# Patient Record
Sex: FEMALE | Race: WHITE | Employment: OTHER | URBAN - NONMETROPOLITAN AREA
[De-identification: names, ages, dates, MRNs, and addresses within clinical notes are randomized per-mention and may not be internally consistent; named-entity substitution may affect disease eponyms.]

---

## 2021-01-05 RX ORDER — MODAFINIL 200 MG/1
200 TABLET ORAL DAILY
COMMUNITY
End: 2021-02-25

## 2021-01-05 RX ORDER — ESCITALOPRAM OXALATE 20 MG/1
20 TABLET ORAL DAILY
COMMUNITY
End: 2022-10-06 | Stop reason: ALTCHOICE

## 2021-01-05 RX ORDER — ALBUTEROL SULFATE 90 UG/1
AEROSOL, METERED RESPIRATORY (INHALATION)
COMMUNITY
End: 2021-02-25

## 2021-01-05 RX ORDER — BUPROPION HYDROCHLORIDE 300 MG/1
300 TABLET ORAL
COMMUNITY

## 2021-01-05 RX ORDER — BUPROPION HYDROCHLORIDE 150 MG/1
150 TABLET ORAL
COMMUNITY
End: 2021-02-25

## 2021-02-25 ENCOUNTER — APPOINTMENT (OUTPATIENT)
Dept: GENERAL RADIOLOGY | Age: 61
DRG: 494 | End: 2021-02-25
Attending: EMERGENCY MEDICINE
Payer: OTHER GOVERNMENT

## 2021-02-25 ENCOUNTER — APPOINTMENT (OUTPATIENT)
Dept: GENERAL RADIOLOGY | Age: 61
DRG: 494 | End: 2021-02-25
Attending: ORTHOPAEDIC SURGERY
Payer: OTHER GOVERNMENT

## 2021-02-25 ENCOUNTER — ANESTHESIA EVENT (OUTPATIENT)
Dept: SURGERY | Age: 61
DRG: 494 | End: 2021-02-25
Payer: OTHER GOVERNMENT

## 2021-02-25 ENCOUNTER — ANESTHESIA (OUTPATIENT)
Dept: SURGERY | Age: 61
DRG: 494 | End: 2021-02-25
Payer: OTHER GOVERNMENT

## 2021-02-25 ENCOUNTER — HOSPITAL ENCOUNTER (INPATIENT)
Age: 61
LOS: 2 days | Discharge: HOME HEALTH | DRG: 494 | End: 2021-02-28
Attending: EMERGENCY MEDICINE | Admitting: INTERNAL MEDICINE
Payer: OTHER GOVERNMENT

## 2021-02-25 DIAGNOSIS — S82.899A FRACTURE OF ANKLE: ICD-10-CM

## 2021-02-25 DIAGNOSIS — S99.911A RIGHT ANKLE INJURY, INITIAL ENCOUNTER: ICD-10-CM

## 2021-02-25 DIAGNOSIS — S99.911A RIGHT ANKLE INJURY, INITIAL ENCOUNTER: Primary | ICD-10-CM

## 2021-02-25 DIAGNOSIS — S82.892A CLOSED FRACTURE OF MALLEOLUS OF LEFT ANKLE, INITIAL ENCOUNTER: ICD-10-CM

## 2021-02-25 DIAGNOSIS — S82.851A CLOSED TRIMALLEOLAR FRACTURE OF RIGHT ANKLE, INITIAL ENCOUNTER: Primary | ICD-10-CM

## 2021-02-25 PROBLEM — M25.571 ACUTE RIGHT ANKLE PAIN: Status: ACTIVE | Noted: 2021-02-25

## 2021-02-25 PROBLEM — S82.202A LEFT TIBIAL FRACTURE: Status: ACTIVE | Noted: 2021-02-25

## 2021-02-25 PROBLEM — E78.2 HYPERLIPIDEMIA, MIXED: Status: ACTIVE | Noted: 2021-02-25

## 2021-02-25 PROBLEM — I10 ESSENTIAL HYPERTENSION: Status: ACTIVE | Noted: 2021-02-25

## 2021-02-25 PROBLEM — K21.9 CHRONIC GERD: Status: ACTIVE | Noted: 2021-02-25

## 2021-02-25 PROBLEM — Z98.890 S/P SURGICAL MANIPULATION OF ANKLE JOINT: Status: ACTIVE | Noted: 2021-02-25

## 2021-02-25 PROBLEM — E11.9 DIABETES MELLITUS TYPE 2, CONTROLLED (HCC): Status: ACTIVE | Noted: 2021-02-25

## 2021-02-25 PROBLEM — S82.841A ANKLE FRACTURE, BIMALLEOLAR, CLOSED, RIGHT, INITIAL ENCOUNTER: Status: ACTIVE | Noted: 2021-02-25

## 2021-02-25 PROBLEM — S92.109A TALUS FRACTURE: Status: ACTIVE | Noted: 2021-02-25

## 2021-02-25 PROBLEM — F32.9 DEPRESSION, MAJOR: Status: ACTIVE | Noted: 2021-02-25

## 2021-02-25 LAB
ABO + RH BLD: NORMAL
ANION GAP SERPL CALC-SCNC: 14 MMOL/L
APPEARANCE UR: CLEAR
APTT PPP: 24.8 SEC (ref 23–36.4)
BASOPHILS # BLD: 0 K/UL (ref 0–0.1)
BASOPHILS NFR BLD: 0 % (ref 0–2)
BILIRUB UR QL: NEGATIVE
BLOOD GROUP ANTIBODIES SERPL: NEGATIVE
BUN SERPL-MCNC: 16 MG/DL (ref 9–21)
BUN/CREAT SERPL: 13
CA-I BLD-MCNC: 9.7 MG/DL (ref 8.5–10.5)
CHLORIDE SERPL-SCNC: 97 MMOL/L (ref 94–111)
CO2 SERPL-SCNC: 26 MMOL/L (ref 21–33)
COLOR UR: NORMAL
COVID-19 RAPID TEST, COVR: NOT DETECTED
CREAT SERPL-MCNC: 1.2 MG/DL (ref 0.7–1.2)
EOSINOPHIL # BLD: 0.1 K/UL (ref 0–0.4)
EOSINOPHIL NFR BLD: 1 % (ref 0–5)
ERYTHROCYTE [DISTWIDTH] IN BLOOD BY AUTOMATED COUNT: 13.5 % (ref 11.6–14.5)
GLUCOSE BLD STRIP.AUTO-MCNC: 151 MG/DL (ref 70–110)
GLUCOSE SERPL-MCNC: 90 MG/DL (ref 70–110)
GLUCOSE UR STRIP.AUTO-MCNC: NEGATIVE MG/DL
HCT VFR BLD AUTO: 37.3 % (ref 35–45)
HGB BLD-MCNC: 12.3 G/DL (ref 12–16)
HGB UR QL STRIP: NEGATIVE
IMM GRANULOCYTES # BLD AUTO: 0 K/UL
IMM GRANULOCYTES NFR BLD AUTO: 0 %
INR PPP: 0.9 (ref 0.8–1.2)
KETONES UR QL STRIP.AUTO: NEGATIVE MG/DL
LEUKOCYTE ESTERASE UR QL STRIP.AUTO: NEGATIVE
LYMPHOCYTES # BLD: 2.9 K/UL (ref 0.9–3.6)
LYMPHOCYTES NFR BLD: 30 % (ref 21–52)
MCH RBC QN AUTO: 29.3 PG (ref 24–34)
MCHC RBC AUTO-ENTMCNC: 33 G/DL (ref 31–37)
MCV RBC AUTO: 88.8 FL (ref 74–97)
MONOCYTES # BLD: 0.8 K/UL (ref 0.05–1.2)
MONOCYTES NFR BLD: 8 % (ref 3–10)
NEUTS SEG # BLD: 6 K/UL (ref 1.8–8)
NEUTS SEG NFR BLD: 61 % (ref 40–73)
NITRITE UR QL STRIP.AUTO: NEGATIVE
PERFORMED BY, TECHID: ABNORMAL
PH UR STRIP: 7 [PH] (ref 5–9)
PLATELET # BLD AUTO: 281 K/UL (ref 135–420)
PMV BLD AUTO: 9.8 FL (ref 9.2–11.8)
POTASSIUM SERPL-SCNC: 3.5 MMOL/L (ref 3.2–5.1)
PROT UR STRIP-MCNC: NEGATIVE MG/DL
PROTHROMBIN TIME: 12 SEC (ref 11.5–15.2)
RBC # BLD AUTO: 4.2 M/UL (ref 4.2–5.3)
SARS-COV-2, COV2: NORMAL
SODIUM SERPL-SCNC: 137 MMOL/L (ref 135–145)
SP GR UR REFRACTOMETRY: 1.01 (ref 1–1.03)
SPECIMEN EXP DATE BLD: NORMAL
SPECIMEN SOURCE: NORMAL
THERAPEUTIC RANGE,PTTT: NORMAL SEC (ref 68–109)
UROBILINOGEN UR QL STRIP.AUTO: 0.2 EU/DL (ref 0.2–1)
WBC # BLD AUTO: 9.7 K/UL (ref 4.6–13.2)

## 2021-02-25 PROCEDURE — 77030010509 HC AIRWY LMA MSK TELE -A: Performed by: NURSE ANESTHETIST, CERTIFIED REGISTERED

## 2021-02-25 PROCEDURE — 77030003779: Performed by: ORTHOPAEDIC SURGERY

## 2021-02-25 PROCEDURE — 80048 BASIC METABOLIC PNL TOTAL CA: CPT

## 2021-02-25 PROCEDURE — 64450 NJX AA&/STRD OTHER PN/BRANCH: CPT | Performed by: NURSE ANESTHETIST, CERTIFIED REGISTERED

## 2021-02-25 PROCEDURE — 77030003915: Performed by: ORTHOPAEDIC SURGERY

## 2021-02-25 PROCEDURE — 77030002982 HC SUT POLYSRB J&J -A: Performed by: ORTHOPAEDIC SURGERY

## 2021-02-25 PROCEDURE — 77030018673: Performed by: ORTHOPAEDIC SURGERY

## 2021-02-25 PROCEDURE — 74011000250 HC RX REV CODE- 250: Performed by: NURSE ANESTHETIST, CERTIFIED REGISTERED

## 2021-02-25 PROCEDURE — 85730 THROMBOPLASTIN TIME PARTIAL: CPT

## 2021-02-25 PROCEDURE — 99218 HC RM OBSERVATION: CPT

## 2021-02-25 PROCEDURE — 81003 URINALYSIS AUTO W/O SCOPE: CPT

## 2021-02-25 PROCEDURE — 77030008462 HC STPLR SKN PROX J&J -A: Performed by: ORTHOPAEDIC SURGERY

## 2021-02-25 PROCEDURE — 74011250636 HC RX REV CODE- 250/636: Performed by: NURSE ANESTHETIST, CERTIFIED REGISTERED

## 2021-02-25 PROCEDURE — 74011250637 HC RX REV CODE- 250/637: Performed by: ORTHOPAEDIC SURGERY

## 2021-02-25 PROCEDURE — 74011250636 HC RX REV CODE- 250/636: Performed by: EMERGENCY MEDICINE

## 2021-02-25 PROCEDURE — 73600 X-RAY EXAM OF ANKLE: CPT

## 2021-02-25 PROCEDURE — 86901 BLOOD TYPING SEROLOGIC RH(D): CPT

## 2021-02-25 PROCEDURE — 77030013079 HC BLNKT BAIR HGGR 3M -A: Performed by: NURSE ANESTHETIST, CERTIFIED REGISTERED

## 2021-02-25 PROCEDURE — C1713 ANCHOR/SCREW BN/BN,TIS/BN: HCPCS | Performed by: ORTHOPAEDIC SURGERY

## 2021-02-25 PROCEDURE — 96375 TX/PRO/DX INJ NEW DRUG ADDON: CPT

## 2021-02-25 PROCEDURE — 74011000258 HC RX REV CODE- 258: Performed by: ORTHOPAEDIC SURGERY

## 2021-02-25 PROCEDURE — 76060000034 HC ANESTHESIA 1.5 TO 2 HR: Performed by: ORTHOPAEDIC SURGERY

## 2021-02-25 PROCEDURE — 77030029372 HC ADH SKN CLSR PRINEO J&J -C: Performed by: ORTHOPAEDIC SURGERY

## 2021-02-25 PROCEDURE — 73610 X-RAY EXAM OF ANKLE: CPT

## 2021-02-25 PROCEDURE — 71045 X-RAY EXAM CHEST 1 VIEW: CPT

## 2021-02-25 PROCEDURE — 36415 COLL VENOUS BLD VENIPUNCTURE: CPT

## 2021-02-25 PROCEDURE — 76210000006 HC OR PH I REC 0.5 TO 1 HR: Performed by: ORTHOPAEDIC SURGERY

## 2021-02-25 PROCEDURE — 77030000032 HC CUF TRNQT ZIMM -B: Performed by: ORTHOPAEDIC SURGERY

## 2021-02-25 PROCEDURE — 74011250636 HC RX REV CODE- 250/636: Performed by: ORTHOPAEDIC SURGERY

## 2021-02-25 PROCEDURE — 2709999900 HC NON-CHARGEABLE SUPPLY: Performed by: ORTHOPAEDIC SURGERY

## 2021-02-25 PROCEDURE — 77010033678 HC OXYGEN DAILY

## 2021-02-25 PROCEDURE — 74011000272 HC RX REV CODE- 272: Performed by: ORTHOPAEDIC SURGERY

## 2021-02-25 PROCEDURE — 82962 GLUCOSE BLOOD TEST: CPT

## 2021-02-25 PROCEDURE — 77030003880 HC BIT DRL TWST BIOM -C: Performed by: ORTHOPAEDIC SURGERY

## 2021-02-25 PROCEDURE — 74011000250 HC RX REV CODE- 250: Performed by: ORTHOPAEDIC SURGERY

## 2021-02-25 PROCEDURE — 85025 COMPLETE CBC W/AUTO DIFF WBC: CPT

## 2021-02-25 PROCEDURE — 96374 THER/PROPH/DIAG INJ IV PUSH: CPT

## 2021-02-25 PROCEDURE — 76942 ECHO GUIDE FOR BIOPSY: CPT | Performed by: NURSE ANESTHETIST, CERTIFIED REGISTERED

## 2021-02-25 PROCEDURE — 0QSJ04Z REPOSITION RIGHT FIBULA WITH INTERNAL FIXATION DEVICE, OPEN APPROACH: ICD-10-PCS | Performed by: ORTHOPAEDIC SURGERY

## 2021-02-25 PROCEDURE — 85610 PROTHROMBIN TIME: CPT

## 2021-02-25 PROCEDURE — 93005 ELECTROCARDIOGRAM TRACING: CPT

## 2021-02-25 PROCEDURE — 76010000153 HC OR TIME 1.5 TO 2 HR: Performed by: ORTHOPAEDIC SURGERY

## 2021-02-25 PROCEDURE — 96376 TX/PRO/DX INJ SAME DRUG ADON: CPT

## 2021-02-25 PROCEDURE — 77030008832 HC WRE K ZIMM -B: Performed by: ORTHOPAEDIC SURGERY

## 2021-02-25 PROCEDURE — 77030031139 HC SUT VCRL2 J&J -A: Performed by: ORTHOPAEDIC SURGERY

## 2021-02-25 PROCEDURE — 99285 EMERGENCY DEPT VISIT HI MDM: CPT

## 2021-02-25 PROCEDURE — 87635 SARS-COV-2 COVID-19 AMP PRB: CPT

## 2021-02-25 DEVICE — IMPLANTABLE DEVICE: Type: IMPLANTABLE DEVICE | Site: ANKLE | Status: FUNCTIONAL

## 2021-02-25 DEVICE — SCREW BNE L10MM DIA3.5MM STD CORT TI DST TIB ST LOK FULL: Type: IMPLANTABLE DEVICE | Site: ANKLE | Status: FUNCTIONAL

## 2021-02-25 DEVICE — SCREW BNE L12MM DIA3.5MM STD CORT DST TIB TI ST LOK FULL: Type: IMPLANTABLE DEVICE | Site: ANKLE | Status: FUNCTIONAL

## 2021-02-25 DEVICE — SCREW BNE L28MM DIA3.5MM CORT DST TIB TI NONCANNULATED: Type: IMPLANTABLE DEVICE | Site: ANKLE | Status: FUNCTIONAL

## 2021-02-25 DEVICE — SCREW BNE L14MM DIA3.5MM EL TI ST LOK MULTDIR FOR ALPS: Type: IMPLANTABLE DEVICE | Site: ANKLE | Status: FUNCTIONAL

## 2021-02-25 DEVICE — SCREW BNE L16MM DIA3.5MM CORT DST TIB TYP II ANODIZED TI ST: Type: IMPLANTABLE DEVICE | Site: ANKLE | Status: FUNCTIONAL

## 2021-02-25 RX ORDER — HYDROMORPHONE HYDROCHLORIDE 1 MG/ML
1 INJECTION, SOLUTION INTRAMUSCULAR; INTRAVENOUS; SUBCUTANEOUS ONCE
Status: COMPLETED | OUTPATIENT
Start: 2021-02-25 | End: 2021-02-25

## 2021-02-25 RX ORDER — KETOROLAC TROMETHAMINE 30 MG/ML
INJECTION, SOLUTION INTRAMUSCULAR; INTRAVENOUS AS NEEDED
Status: DISCONTINUED | OUTPATIENT
Start: 2021-02-25 | End: 2021-02-25 | Stop reason: HOSPADM

## 2021-02-25 RX ORDER — ATORVASTATIN CALCIUM 20 MG/1
20 TABLET, FILM COATED ORAL DAILY
COMMUNITY

## 2021-02-25 RX ORDER — HYDROCHLOROTHIAZIDE 12.5 MG/1
12.5 CAPSULE ORAL DAILY
COMMUNITY
End: 2022-10-06

## 2021-02-25 RX ORDER — BUPROPION HYDROCHLORIDE 150 MG/1
300 TABLET ORAL
Status: DISCONTINUED | OUTPATIENT
Start: 2021-02-26 | End: 2021-02-28 | Stop reason: HOSPADM

## 2021-02-25 RX ORDER — BISMUTH SUBSALICYLATE 262 MG
1 TABLET,CHEWABLE ORAL DAILY
COMMUNITY

## 2021-02-25 RX ORDER — CALCIUM CARBONATE/VITAMIN D3 600 MG-125
600 TABLET ORAL
COMMUNITY
End: 2022-10-06

## 2021-02-25 RX ORDER — DEXAMETHASONE SODIUM PHOSPHATE 4 MG/ML
INJECTION, SOLUTION INTRA-ARTICULAR; INTRALESIONAL; INTRAMUSCULAR; INTRAVENOUS; SOFT TISSUE
Status: SHIPPED | OUTPATIENT
Start: 2021-02-25 | End: 2021-02-25

## 2021-02-25 RX ORDER — OMEPRAZOLE 20 MG/1
20 CAPSULE, DELAYED RELEASE ORAL DAILY
Status: DISCONTINUED | OUTPATIENT
Start: 2021-02-26 | End: 2021-02-26

## 2021-02-25 RX ORDER — FENTANYL CITRATE 50 UG/ML
25 INJECTION, SOLUTION INTRAMUSCULAR; INTRAVENOUS
Status: DISCONTINUED | OUTPATIENT
Start: 2021-02-25 | End: 2021-02-25 | Stop reason: HOSPADM

## 2021-02-25 RX ORDER — FLUMAZENIL 0.1 MG/ML
0.2 INJECTION INTRAVENOUS
Status: DISCONTINUED | OUTPATIENT
Start: 2021-02-25 | End: 2021-02-25 | Stop reason: HOSPADM

## 2021-02-25 RX ORDER — ACETAMINOPHEN 325 MG/1
650 TABLET ORAL
Status: DISCONTINUED | OUTPATIENT
Start: 2021-02-25 | End: 2021-02-28 | Stop reason: HOSPADM

## 2021-02-25 RX ORDER — DEXAMETHASONE SODIUM PHOSPHATE 4 MG/ML
INJECTION, SOLUTION INTRA-ARTICULAR; INTRALESIONAL; INTRAMUSCULAR; INTRAVENOUS; SOFT TISSUE AS NEEDED
Status: DISCONTINUED | OUTPATIENT
Start: 2021-02-25 | End: 2021-02-25 | Stop reason: HOSPADM

## 2021-02-25 RX ORDER — HYDROCHLOROTHIAZIDE 12.5 MG/1
12.5 CAPSULE ORAL DAILY
Status: DISCONTINUED | OUTPATIENT
Start: 2021-02-26 | End: 2021-02-26

## 2021-02-25 RX ORDER — NALOXONE HYDROCHLORIDE 0.4 MG/ML
0.2 INJECTION, SOLUTION INTRAMUSCULAR; INTRAVENOUS; SUBCUTANEOUS AS NEEDED
Status: DISCONTINUED | OUTPATIENT
Start: 2021-02-25 | End: 2021-02-25 | Stop reason: HOSPADM

## 2021-02-25 RX ORDER — ASPIRIN 325 MG
325 TABLET, DELAYED RELEASE (ENTERIC COATED) ORAL 2 TIMES DAILY
Status: DISCONTINUED | OUTPATIENT
Start: 2021-02-25 | End: 2021-02-28 | Stop reason: HOSPADM

## 2021-02-25 RX ORDER — PHENOL/SODIUM PHENOLATE
20 AEROSOL, SPRAY (ML) MUCOUS MEMBRANE DAILY
COMMUNITY
End: 2021-05-11 | Stop reason: ALTCHOICE

## 2021-02-25 RX ORDER — GABAPENTIN 100 MG/1
200 CAPSULE ORAL DAILY
COMMUNITY
End: 2021-03-16 | Stop reason: ALTCHOICE

## 2021-02-25 RX ORDER — SODIUM CHLORIDE 9 MG/ML
150 INJECTION, SOLUTION INTRAVENOUS CONTINUOUS
Status: DISCONTINUED | OUTPATIENT
Start: 2021-02-25 | End: 2021-02-27

## 2021-02-25 RX ORDER — METFORMIN HYDROCHLORIDE 500 MG/1
500 TABLET ORAL
COMMUNITY
End: 2021-03-16 | Stop reason: ALTCHOICE

## 2021-02-25 RX ORDER — SODIUM CHLORIDE 0.9 % (FLUSH) 0.9 %
5-40 SYRINGE (ML) INJECTION AS NEEDED
Status: DISCONTINUED | OUTPATIENT
Start: 2021-02-25 | End: 2021-02-28 | Stop reason: HOSPADM

## 2021-02-25 RX ORDER — KETAMINE HCL IN 0.9 % NACL 50 MG/5 ML
SYRINGE (ML) INTRAVENOUS AS NEEDED
Status: DISCONTINUED | OUTPATIENT
Start: 2021-02-25 | End: 2021-02-25 | Stop reason: HOSPADM

## 2021-02-25 RX ORDER — MIDAZOLAM HYDROCHLORIDE 1 MG/ML
INJECTION, SOLUTION INTRAMUSCULAR; INTRAVENOUS AS NEEDED
Status: DISCONTINUED | OUTPATIENT
Start: 2021-02-25 | End: 2021-02-25 | Stop reason: HOSPADM

## 2021-02-25 RX ORDER — MAGNESIUM SULFATE 100 %
4 CRYSTALS MISCELLANEOUS AS NEEDED
Status: DISCONTINUED | OUTPATIENT
Start: 2021-02-25 | End: 2021-02-28 | Stop reason: HOSPADM

## 2021-02-25 RX ORDER — LOSARTAN POTASSIUM 25 MG/1
50 TABLET ORAL DAILY
Status: DISCONTINUED | OUTPATIENT
Start: 2021-02-26 | End: 2021-02-28 | Stop reason: HOSPADM

## 2021-02-25 RX ORDER — SODIUM CHLORIDE 0.9 % (FLUSH) 0.9 %
5-40 SYRINGE (ML) INJECTION EVERY 8 HOURS
Status: DISCONTINUED | OUTPATIENT
Start: 2021-02-25 | End: 2021-02-28 | Stop reason: HOSPADM

## 2021-02-25 RX ORDER — BUPIVACAINE HYDROCHLORIDE 5 MG/ML
INJECTION, SOLUTION EPIDURAL; INTRACAUDAL
Status: SHIPPED | OUTPATIENT
Start: 2021-02-25 | End: 2021-02-25

## 2021-02-25 RX ORDER — PROPOFOL 10 MG/ML
INJECTION, EMULSION INTRAVENOUS AS NEEDED
Status: DISCONTINUED | OUTPATIENT
Start: 2021-02-25 | End: 2021-02-25 | Stop reason: HOSPADM

## 2021-02-25 RX ORDER — DEXTROSE 50 % IN WATER (D50W) INTRAVENOUS SYRINGE
25-50 AS NEEDED
Status: DISCONTINUED | OUTPATIENT
Start: 2021-02-25 | End: 2021-02-28 | Stop reason: HOSPADM

## 2021-02-25 RX ORDER — GABAPENTIN 100 MG/1
200 CAPSULE ORAL DAILY
Status: DISCONTINUED | OUTPATIENT
Start: 2021-02-26 | End: 2021-02-28 | Stop reason: HOSPADM

## 2021-02-25 RX ORDER — CEPHALEXIN 500 MG/1
500 CAPSULE ORAL 4 TIMES DAILY
Qty: 28 CAP | Refills: 0 | OUTPATIENT
Start: 2021-02-25 | End: 2021-02-28

## 2021-02-25 RX ORDER — HYDROCODONE BITARTRATE AND ACETAMINOPHEN 5; 325 MG/1; MG/1
2 TABLET ORAL
Status: DISCONTINUED | OUTPATIENT
Start: 2021-02-25 | End: 2021-02-28 | Stop reason: HOSPADM

## 2021-02-25 RX ORDER — INSULIN LISPRO 100 [IU]/ML
INJECTION, SOLUTION INTRAVENOUS; SUBCUTANEOUS
Status: DISCONTINUED | OUTPATIENT
Start: 2021-02-26 | End: 2021-02-28 | Stop reason: HOSPADM

## 2021-02-25 RX ORDER — ASCORBIC ACID 500 MG
500 TABLET ORAL DAILY
Status: ON HOLD | COMMUNITY
End: 2022-02-22 | Stop reason: CLARIF

## 2021-02-25 RX ORDER — ASCORBIC ACID 500 MG
500 TABLET ORAL DAILY
Status: DISCONTINUED | OUTPATIENT
Start: 2021-02-26 | End: 2021-02-28 | Stop reason: HOSPADM

## 2021-02-25 RX ORDER — CITALOPRAM 20 MG/1
20 TABLET, FILM COATED ORAL DAILY
Status: DISCONTINUED | OUTPATIENT
Start: 2021-02-26 | End: 2021-02-28 | Stop reason: HOSPADM

## 2021-02-25 RX ORDER — HYDROCODONE BITARTRATE AND ACETAMINOPHEN 5; 325 MG/1; MG/1
1 TABLET ORAL AS NEEDED
Status: DISCONTINUED | OUTPATIENT
Start: 2021-02-25 | End: 2021-02-25 | Stop reason: HOSPADM

## 2021-02-25 RX ORDER — LOSARTAN POTASSIUM 50 MG/1
50 TABLET ORAL DAILY
COMMUNITY

## 2021-02-25 RX ORDER — ATORVASTATIN CALCIUM 10 MG/1
20 TABLET, FILM COATED ORAL DAILY
Status: DISCONTINUED | OUTPATIENT
Start: 2021-02-26 | End: 2021-02-28 | Stop reason: HOSPADM

## 2021-02-25 RX ORDER — ONDANSETRON 2 MG/ML
4 INJECTION INTRAMUSCULAR; INTRAVENOUS
Status: COMPLETED | OUTPATIENT
Start: 2021-02-25 | End: 2021-02-25

## 2021-02-25 RX ORDER — OXYCODONE AND ACETAMINOPHEN 5; 325 MG/1; MG/1
1 TABLET ORAL
Qty: 30 TAB | Refills: 0 | Status: SHIPPED | OUTPATIENT
Start: 2021-02-25 | End: 2021-02-28 | Stop reason: SDUPTHER

## 2021-02-25 RX ORDER — ONDANSETRON 2 MG/ML
4 INJECTION INTRAMUSCULAR; INTRAVENOUS ONCE
Status: DISCONTINUED | OUTPATIENT
Start: 2021-02-25 | End: 2021-02-25 | Stop reason: HOSPADM

## 2021-02-25 RX ORDER — ONDANSETRON 2 MG/ML
INJECTION INTRAMUSCULAR; INTRAVENOUS AS NEEDED
Status: DISCONTINUED | OUTPATIENT
Start: 2021-02-25 | End: 2021-02-25 | Stop reason: HOSPADM

## 2021-02-25 RX ORDER — HYDROMORPHONE HYDROCHLORIDE 2 MG/1
2 TABLET ORAL
Status: DISCONTINUED | OUTPATIENT
Start: 2021-02-25 | End: 2021-02-28 | Stop reason: HOSPADM

## 2021-02-25 RX ADMIN — PROPOFOL 150 MG: 10 INJECTION, EMULSION INTRAVENOUS at 17:20

## 2021-02-25 RX ADMIN — FENTANYL CITRATE 25 MCG: 50 INJECTION, SOLUTION INTRAMUSCULAR; INTRAVENOUS at 19:33

## 2021-02-25 RX ADMIN — BUPIVACAINE HYDROCHLORIDE 30 ML: 5 INJECTION, SOLUTION EPIDURAL; INTRACAUDAL at 07:09

## 2021-02-25 RX ADMIN — FENTANYL CITRATE 25 MCG: 50 INJECTION, SOLUTION INTRAMUSCULAR; INTRAVENOUS at 19:43

## 2021-02-25 RX ADMIN — HYDROMORPHONE HYDROCHLORIDE 1 MG: 1 INJECTION, SOLUTION INTRAMUSCULAR; INTRAVENOUS; SUBCUTANEOUS at 15:15

## 2021-02-25 RX ADMIN — ASPIRIN 325 MG: 325 TABLET, COATED ORAL at 21:26

## 2021-02-25 RX ADMIN — ONDANSETRON 4 MG: 2 INJECTION INTRAMUSCULAR; INTRAVENOUS at 14:36

## 2021-02-25 RX ADMIN — Medication 10 ML: at 21:26

## 2021-02-25 RX ADMIN — FENTANYL CITRATE 25 MCG: 50 INJECTION, SOLUTION INTRAMUSCULAR; INTRAVENOUS at 19:28

## 2021-02-25 RX ADMIN — CEFAZOLIN SODIUM 2 G: 1 INJECTION, POWDER, FOR SOLUTION INTRAMUSCULAR; INTRAVENOUS at 17:13

## 2021-02-25 RX ADMIN — SODIUM CHLORIDE 1000 ML: 9 INJECTION, SOLUTION INTRAVENOUS at 20:25

## 2021-02-25 RX ADMIN — FENTANYL CITRATE 25 MCG: 50 INJECTION, SOLUTION INTRAMUSCULAR; INTRAVENOUS at 19:38

## 2021-02-25 RX ADMIN — DEXAMETHASONE SODIUM PHOSPHATE 4 MG: 4 INJECTION, SOLUTION INTRAMUSCULAR; INTRAVENOUS at 17:25

## 2021-02-25 RX ADMIN — HYDROMORPHONE HYDROCHLORIDE 2 MG: 2 TABLET ORAL at 21:26

## 2021-02-25 RX ADMIN — HYDROMORPHONE HYDROCHLORIDE 1 MG: 1 INJECTION, SOLUTION INTRAMUSCULAR; INTRAVENOUS; SUBCUTANEOUS at 14:36

## 2021-02-25 RX ADMIN — KETOROLAC TROMETHAMINE 30 MG: 30 INJECTION, SOLUTION INTRAMUSCULAR at 18:35

## 2021-02-25 RX ADMIN — DEXAMETHASONE SODIUM PHOSPHATE 4 MG: 4 INJECTION, SOLUTION INTRAMUSCULAR; INTRAVENOUS at 07:09

## 2021-02-25 RX ADMIN — CEFAZOLIN SODIUM 2 G: 1 INJECTION, POWDER, FOR SOLUTION INTRAMUSCULAR; INTRAVENOUS at 21:25

## 2021-02-25 RX ADMIN — Medication 30 MG: at 17:25

## 2021-02-25 RX ADMIN — MIDAZOLAM 2 MG: 1 INJECTION INTRAMUSCULAR; INTRAVENOUS at 17:02

## 2021-02-25 RX ADMIN — SODIUM CHLORIDE: 9 INJECTION, SOLUTION INTRAVENOUS at 17:00

## 2021-02-25 RX ADMIN — Medication 20 MG: at 17:02

## 2021-02-25 RX ADMIN — ONDANSETRON HYDROCHLORIDE 4 MG: 2 INJECTION, SOLUTION INTRAMUSCULAR; INTRAVENOUS at 17:25

## 2021-02-25 NOTE — ANESTHESIA POSTPROCEDURE EVALUATION
Procedure(s):  RIGHT ANKLE OPEN REDUCTION INTERNAL FIXATION.     general, regional    Anesthesia Post Evaluation      Multimodal analgesia: multimodal analgesia used between 6 hours prior to anesthesia start to PACU discharge  Patient location during evaluation: PACU  Patient participation: complete - patient participated  Level of consciousness: awake and alert  Pain score: 0  Pain management: satisfactory to patient  Airway patency: patent  Anesthetic complications: no  Cardiovascular status: hemodynamically stable and acceptable  Respiratory status: spontaneous ventilation and acceptable  Post anesthesia nausea and vomiting:  none  Final Post Anesthesia Temperature Assessment:  Normothermia (36.0-37.5 degrees C)      INITIAL Post-op Vital signs:   Vitals Value Taken Time   /61 02/25/21 1850   Temp 36 °C (96.8 °F) 02/25/21 1850   Pulse 84 02/25/21 1850   Resp 15 02/25/21 1850   SpO2 97 % 02/25/21 1850

## 2021-02-25 NOTE — PROGRESS NOTES
Pt tx'd to OPS. Placed on cardiac monitor. Stable. Awaiting Dr. Kris Villa for surgery. Pt a/o x3 and has no complaints at this time.

## 2021-02-25 NOTE — ANESTHESIA PROCEDURE NOTES
Peripheral Block    Start time: 2/25/2021 5:02 PM  End time: 2/25/2021 5:11 PM  Performed by: Billy Prajapati CRNA  Authorized by: Billy Prajapati CRNA       Pre-procedure: Indications: post-op pain management    Preanesthetic Checklist: patient identified, risks and benefits discussed, site marked, timeout performed, anesthesia consent given and patient being monitored    Timeout Time: 17:02          Block Type:   Block Type:   Adductor canal and popliteal  Laterality:  Right  Monitoring:  Standard ASA monitoring, continuous pulse ox, frequent vital sign checks, heart rate, responsive to questions and oxygen  Needle Type:  Ultraplex  Needle Gauge:  20 G  Needle Localization:  Ultrasound guidance and anatomical landmarks  Medication Injected:  Bupivacaine (PF) (MARCAINE) 0.5% injection, 30 mL  dexamethasone (DECADRON) 4 mg/mL injection, 4 mg  Med Admin Time: 2/25/2021 7:09 AM    Assessment:  Number of attempts:  1  Injection Assessment:  Incremental injection every 5 mL, local visualized surrounding nerve on ultrasound, negative aspiration for blood, no paresthesia, no intravascular symptoms, low pressure verified by pressure monitor and ultrasound image on chart  Patient tolerance:  Patient tolerated the procedure well with no immediate complications

## 2021-02-25 NOTE — CONSULTS
Weight Loss Metrics 2/25/2021   Today's Wt 215 lb   BMI 29.16 kg/m2       Body mass index is 29.16 kg/m². Past Medical History:   Diagnosis Date    Diabetes ([address]     GERD (gastroesophageal reflux disease)     Hyperlipemia     Hypertension     Psychiatric disorder        Past Surgical History:   Procedure Laterality Date    HX CERVICAL FUSION         Current Facility-Administered Medications   Medication Dose Route Frequency Provider Last Rate Last Admin    sodium chloride 0.9 % bolus infusion 1,000 mL  1,000 mL IntraVENous ONCE Triston Patricia MD        0.9% sodium chloride infusion  150 mL/hr IntraVENous CONTINUOUS Triston Patricia MD        ceFAZolin (ANCEF) 2 g in 0.9% sodium chloride 100 mL IVPB  2 g IntraVENous ON CALL TO OR Chad Mccarthy MD             No Known Allergies    Social History     Tobacco Use    Smoking status: Never Smoker    Smokeless tobacco: Never Used   Substance Use Topics    Alcohol use: Not Currently    Drug use: Not Currently       History reviewed. No pertinent family history. ROS      Visit Vitals  /79 (BP 1 Location: Left upper arm, BP Patient Position: Supine)   Pulse 80   Temp 98.2 °F (36.8 °C)   Resp 17   Ht 6' (1.829 m)   Wt 215 lb (97.5 kg)   SpO2 97%   BMI 29.16 kg/m²     Patient is status post fall with bilateral ankle pain presents to the emergency room with bilateral ankle pain. Denies any other complaints. She is alert oriented and awake in no acute distress. X-rays of bilateral ankles are positive for fracture. X-ray of the left ankle is positive for nondisplaced distal fibula fracture. X-ray of right ankle positive for right ankle fracture subluxation trimalar fracture. Physical examination of the left lower extremity. Mild swelling good cap refill grossly neuro vas intact no instability decreased range of motion  some point tenderness. Physical examination of the right lower extremity well-padded splint.   Good cap refill. Mild deformities decreased range of motion decreased sense. Impression left ankle nondisplaced distal fibula fracture. Right ankle displaced trimall fracture. Plan for the left lower extremity weightbearing as tolerated in the cam boot. For the right lower extremity nonweightbearing post surgery. DVT prophylaxis with aspirin. Antibiotics. Pain medication. Plan will be for surgery. Risk and benefits of the surgery were explained to the patient. Consent was willingly obtained.

## 2021-02-25 NOTE — ANESTHESIA PREPROCEDURE EVALUATION
Relevant Problems   No relevant active problems       Anesthetic History   No history of anesthetic complications            Review of Systems / Medical History  Patient summary reviewed, nursing notes reviewed and pertinent labs reviewed    Pulmonary                   Neuro/Psych         Psychiatric history    Comments: Depression   anxiety Cardiovascular    Hypertension: well controlled          Hyperlipidemia    Exercise tolerance: >4 METS     GI/Hepatic/Renal     GERD: well controlled           Endo/Other    Diabetes: well controlled, type 2    Obesity     Other Findings              Physical Exam    Airway  Mallampati: I  TM Distance: < 4 cm  Neck ROM: normal range of motion   Mouth opening: Normal     Cardiovascular    Rhythm: regular  Rate: normal         Dental    Dentition: Lower dentition intact and Upper dentition intact     Pulmonary  Breath sounds clear to auscultation               Abdominal  Abdominal exam normal       Other Findings            Anesthetic Plan    ASA: 3, emergent  Anesthesia type: general and regional - popliteal fossa block and saphenous block          Induction: Intravenous  Anesthetic plan and risks discussed with: Patient

## 2021-02-25 NOTE — ED PROVIDER NOTES
EMERGENCY DEPARTMENT HISTORY AND PHYSICAL EXAM      Date: 2/25/2021  Patient Name: Carolin Grijalva    History of Presenting Illness     Chief Complaint   Patient presents with    Fall       History Provided By: Patient and EMS    HPI: Carolin Grijalva, 64 y.o. female presents to the ED with complaints of injuries sustained after a fall. Patient states that she was stepping down from a curb with her left foot when she believes that she tripped and landed on her right foot causing it to twist. She states that it is very painful and there is an obvious deformity to it. She also states that her left ankle hurts as well, but pales in comparison to the pain in her right ankle. Denies any other injuries. There are no other complaints, changes, or physical findings at this time. PCP: None    Current Facility-Administered Medications   Medication Dose Route Frequency Provider Last Rate Last Admin    sodium chloride 0.9 % bolus infusion 1,000 mL  1,000 mL IntraVENous ONCE Brent Patricia MD        0.9% sodium chloride infusion  150 mL/hr IntraVENous CONTINUOUS Brent Patricia MD           Past History     Past Medical History:  Past Medical History:   Diagnosis Date    Diabetes ([address]     GERD (gastroesophageal reflux disease)     Hyperlipemia     Hypertension     Psychiatric disorder        Past Surgical History:  Past Surgical History:   Procedure Laterality Date    HX CERVICAL FUSION         Family History:  History reviewed. No pertinent family history. Social History:  Social History     Tobacco Use    Smoking status: Never Smoker    Smokeless tobacco: Never Used   Substance Use Topics    Alcohol use: Not Currently    Drug use: Not Currently       Allergies:  No Known Allergies      Review of Systems   Review of Systems   Constitutional: Negative for diaphoresis, fatigue and fever. HENT: Negative for ear pain, rhinorrhea and sore throat.     Eyes: Negative for photophobia, pain and redness. Respiratory: Negative for cough, chest tightness and shortness of breath. Cardiovascular: Negative for chest pain, palpitations and leg swelling. Gastrointestinal: Negative for abdominal pain, diarrhea, nausea and vomiting. Endocrine: Negative for polydipsia, polyphagia and polyuria. Genitourinary: Negative for frequency, hematuria and pelvic pain. Musculoskeletal: Negative for arthralgias, back pain and joint swelling. Pain of the left and right ankles with an obvious deformity to the right. Skin: Negative for color change, pallor, rash and wound. Allergic/Immunologic: Negative for environmental allergies, food allergies and immunocompromised state. Neurological: Negative for dizziness, seizures, numbness and headaches. Hematological: Negative for adenopathy. Does not bruise/bleed easily. Psychiatric/Behavioral: Negative for confusion and self-injury. The patient is not nervous/anxious. Physical Exam   Physical Exam  Vitals signs and nursing note reviewed. Constitutional:       General: She is not in acute distress. Appearance: Normal appearance. She is normal weight. She is not ill-appearing. Comments: Uncomfortable and in pain   HENT:      Head: Normocephalic and atraumatic. Right Ear: Tympanic membrane, ear canal and external ear normal.      Left Ear: Tympanic membrane, ear canal and external ear normal.      Nose: Nose normal.      Mouth/Throat:      Mouth: Mucous membranes are moist.      Pharynx: Oropharynx is clear. Eyes:      Extraocular Movements: Extraocular movements intact. Conjunctiva/sclera: Conjunctivae normal.      Pupils: Pupils are equal, round, and reactive to light. Neck:      Musculoskeletal: Normal range of motion and neck supple. No neck rigidity or muscular tenderness. Cardiovascular:      Rate and Rhythm: Normal rate and regular rhythm. Pulses: Normal pulses. Heart sounds: Normal heart sounds. No murmur.  No friction rub. No gallop. Pulmonary:      Effort: Pulmonary effort is normal. No respiratory distress. Breath sounds: Normal breath sounds. Chest:      Chest wall: No tenderness. Abdominal:      General: Bowel sounds are normal.      Palpations: Abdomen is soft. There is no mass. Tenderness: There is no abdominal tenderness. Musculoskeletal:         General: Tenderness, deformity and signs of injury present. Right ankle: She exhibits decreased range of motion and deformity. She exhibits no swelling, no ecchymosis, no laceration and normal pulse. Tenderness. Left ankle: She exhibits swelling. She exhibits normal range of motion, no ecchymosis, no deformity, no laceration and normal pulse. Tenderness. Lateral malleolus tenderness found. Feet:       Comments: Mortise of the right foot is laterally displaced   Skin:     General: Skin is warm. Coloration: Skin is not pale. Findings: No bruising or erythema. Neurological:      Mental Status: She is alert and oriented to person, place, and time. Motor: No weakness. Psychiatric:         Mood and Affect: Mood normal.         Behavior: Behavior normal.         Thought Content: Thought content normal.         Judgment: Judgment normal.         Diagnostic Study Results     Labs -     Recent Results (from the past 12 hour(s))   CBC WITH AUTOMATED DIFF    Collection Time: 02/25/21  2:35 PM   Result Value Ref Range    WBC 9.7 4.6 - 13.2 K/uL    RBC 4.20 4. 20 - 5.30 M/uL    HGB 12.3 12.0 - 16.0 g/dL    HCT 37.3 35.0 - 45.0 %    MCV 88.8 74.0 - 97.0 FL    MCH 29.3 24.0 - 34.0 PG    MCHC 33.0 31.0 - 37.0 g/dL    RDW 13.5 11.6 - 14.5 %    PLATELET 608 807 - 175 K/uL    MPV 9.8 9.2 - 11.8 FL    NEUTROPHILS 61 40 - 73 %    LYMPHOCYTES 30 21 - 52 %    MONOCYTES 8 3 - 10 %    EOSINOPHILS 1 0 - 5 %    BASOPHILS 0 0 - 2 %    IMMATURE GRANULOCYTES 0 %    ABS. NEUTROPHILS 6.0 1.8 - 8.0 K/UL    ABS. LYMPHOCYTES 2.9 0.9 - 3.6 K/UL    ABS. MONOCYTES 0.8 0.05 - 1.2 K/UL    ABS. EOSINOPHILS 0.1 0.0 - 0.4 K/UL    ABS. BASOPHILS 0.0 0.0 - 0.1 K/UL    ABS. IMM. GRANS. 0.0 K/UL   METABOLIC PANEL, BASIC    Collection Time: 02/25/21  2:35 PM   Result Value Ref Range    Sodium 137 135 - 145 mmol/L    Potassium 3.5 3.2 - 5.1 mmol/L    Chloride 97 94 - 111 mmol/L    CO2 26 21 - 33 mmol/L    Anion gap 14 mmol/L    Glucose 90 70 - 110 mg/dL    BUN 16 9 - 21 mg/dL    Creatinine 1.20 0.70 - 1.20 mg/dL    BUN/Creatinine ratio 13      GFR est AA 55 ml/min/1.73m2    GFR est non-AA 46 ml/min/1.73m2    Calcium 9.7 8.5 - 10.5 mg/dL   TYPE & SCREEN    Collection Time: 02/25/21  2:35 PM   Result Value Ref Range    Crossmatch Expiration 02/28/2021,2359     ABO/Rh(D) A Positive     Antibody screen Negative    PROTHROMBIN TIME + INR    Collection Time: 02/25/21  2:35 PM   Result Value Ref Range    Prothrombin time 12.0 11.5 - 15.2 sec    INR 0.9 0.8 - 1.2     PTT    Collection Time: 02/25/21  2:35 PM   Result Value Ref Range    aPTT 24.8 23.0 - 36.4 sec    aPTT, therapeutic range   68 - 109 sec   SARS-COV-2    Collection Time: 02/25/21  3:20 PM   Result Value Ref Range    SARS-CoV-2 Please find results under separate order     COVID-19 RAPID TEST    Collection Time: 02/25/21  3:20 PM   Result Value Ref Range    Specimen source Nasopharyngeal      COVID-19 rapid test Not Detected Not Detected     URINALYSIS W/ RFLX MICROSCOPIC    Collection Time: 02/25/21  3:30 PM   Result Value Ref Range    Color Yellow/Straw      Appearance Clear Clear      Specific gravity 1.010 1.003 - 1.035      pH (UA) 7.0 5.0 - 9.0      Protein Negative Negative mg/dL    Glucose Negative Negative mg/dL    Ketone Negative Negative mg/dL    Bilirubin Negative Negative      Blood Negative Negative      Urobilinogen 0.2 0.2 - 1.0 EU/dL    Nitrites Negative Negative      Leukocyte Esterase Negative Negative         Radiologic Studies -   XR CHEST PORT   Final Result      No acute cardiopulmonary abnormality. XR ANKLE LT MIN 3 V   Final Result      1. Displaced and angulated trimalleolar right ankle fracture. 2. Nondisplaced distal left fibular fracture and small dorsal talar anterior   process avulsion fracture. XR ANKLE RT AP/LAT   Final Result      1. Displaced and angulated trimalleolar right ankle fracture. 2. Nondisplaced distal left fibular fracture and small dorsal talar anterior   process avulsion fracture. XR ANKLE RT MIN 3 V    (Results Pending)   NC XR TECHNOLOGIST SERVICE    (Results Pending)     CT Results  (Last 48 hours)    None        CXR Results  (Last 48 hours)               02/25/21 1518  XR CHEST PORT Final result    Impression:      No acute cardiopulmonary abnormality. Narrative:  EXAM: XR CHEST PORT       CLINICAL INDICATION/HISTORY: Fall   -Additional: None       COMPARISON: None       TECHNIQUE: Portable frontal view of the chest       _______________       FINDINGS:       SUPPORT DEVICES: None. HEART AND MEDIASTINUM: Cardiomediastinal silhouette within normal limits. LUNGS AND PLEURAL SPACES: No dense consolidation, large effusion or   pneumothorax.       _______________                 Medical Decision Making and ED Course   I am the first provider for this patient. I reviewed the vital signs, available nursing notes, past medical history, past surgical history, family history and social history. Vital Signs-Reviewed the patient's vital signs. Patient Vitals for the past 12 hrs:   Temp Pulse Resp BP SpO2   02/25/21 1619 98.2 °F (36.8 °C) 80 17 123/79 97 %   02/25/21 1600 -- 78 16 (!) 144/60 99 %   02/25/21 1530 -- 82 16 131/74 98 %   02/25/21 1446 -- 75 16 (!) 146/55 99 %   02/25/21 1346 98.3 °F (36.8 °C) 79 16 (!) 146/55 96 %       EKG interpretation: (Preliminary): Performed at 1538; Read at 1538. EKG: normal sinus rhythm, Left anterior fascicular block, Low voltage, precordial leads, Consider anterior infarct.  Rate:76      Records Reviewed: Nursing Notes    The patient presents with     ED Course:   Initial assessment performed. The patients presenting problems have been discussed, and they are in agreement with the care plan formulated and outlined with them. I have encouraged them to ask questions as they arise throughout their visit. Provider Notes (Medical Decision Making):         Consultations:       Consultations: [phone] with Dr. Ilir Garcia from Orthopedics suggested transferring the patient to an appropriate fascility as he is not in town right now. 1513: Dr. Kim Arnett gave a return call and  advised to offer patient the option of surgery here today or proceed with transfer to [facility] and the patient prefers to stay here. Procedures     Splinting: a posterior short leg splint was applied by MD using orthoglass. No neurovascular changes  Pt tolerated procedure well. Disposition     Disposition:  Admitted to OR            Diagnosis     Clinical Impression:   1. Closed trimalleolar fracture of right ankle, initial encounter    2. Fracture of ankle    3. Closed fracture of malleolus of left ankle, initial encounter        Attestations:    By signing my name below, I, Beverly Cordon, attest that this documentation has been prepared under the direction and in presence of Dr. Bryn Abbott on 02/25/21. Electronically signed: Beverly Cordon, 02/25/21, 1:58 PM      Please note that this dictation was completed with [employer], the computer voice recognition software. Quite often unanticipated grammatical, syntax, homophones, and other interpretive errors are inadvertently transcribed by the computer software. Please disregard these errors. Please excuse any errors that have escaped final proofreading. Thank you.

## 2021-02-25 NOTE — OP NOTES
Operative Note    Patient: Alicia Nassar MRN: [id number]  Surgery Date: 2/25/2021  [unfilled]          Procedure  Primary Surgeon    RIGHT ANKLE OPEN REDUCTION INTERNAL FIXATION  Tushar Biggs MD    * Panel 2 does not exist *  * Panel 2 does not exist *    * Panel 3 does not exist *  * Panel 3 does not exist *     Surgeon(s) and Role:     * Tushar Biggs MD - Primary    Other OR Staff/Assistants:  Circ-1: Wiliam Valentin  Registered Nurse First Assistant: Amirah Rodriguez  Scrub Tech-1: Joshua Mejia    1st Assistant Tasks:  Closing    Pre-operative Diagnosis:  Closed fracture of right ankle, initial encounter [S84.677E]    Post-operative Diagnosis: same as preop diagnosis    Anesthesia Type: General     Findings:     Complications: No    EBL: Minimal    Specimens: None    Procedure: Right ankle was prepped and draped in a sterile fashion after adequate anesthesia was given. Timeout was performed. Longitudinal incision on the lateral aspect of the ankle was made. The ankle fracture blunt dissection was performed. The fracture was identified. Reduction was provisionally achieved with a clamp. Subsequently a leg screw was placed. Then a neutralization plate with locking screws distally and proximally was placed. Patient was found to have excellent range of motion and stability on the lateral side. The medial side was found to be unstable. At that point a second incision on the medial side was performed. Blunt dissection was performed down to the medial side. Open reduction was performed using K wires. Subsequently 1 medial screw was placed into the the medial malleolus. After adequate fixation was achieved there patient was found excellent range of motion and stability. Copious irrigation performed. Skin was closed with Vicryl and staples. Compressive dressing was applied. Patient was taken to PACU in stable condition in a splint.     Fluoroscopy was used for approximately 5 minutes for assistance in reduction and placement of hardware both on the medial and lateral side of the ankle.

## 2021-02-26 LAB
ANION GAP SERPL CALC-SCNC: 8 MMOL/L
ATRIAL RATE: 77 BPM
BASOPHILS # BLD: 0 K/UL (ref 0–0.1)
BASOPHILS NFR BLD: 0 % (ref 0–2)
BUN SERPL-MCNC: 19 MG/DL (ref 9–21)
BUN/CREAT SERPL: 16
CA-I BLD-MCNC: 8.2 MG/DL (ref 8.5–10.5)
CALCULATED P AXIS, ECG09: 47 DEGREES
CALCULATED R AXIS, ECG10: -48 DEGREES
CALCULATED T AXIS, ECG11: -5 DEGREES
CHLORIDE SERPL-SCNC: 103 MMOL/L (ref 94–111)
CO2 SERPL-SCNC: 24 MMOL/L (ref 21–33)
CREAT SERPL-MCNC: 1.2 MG/DL (ref 0.7–1.2)
DIAGNOSIS, 93000: NORMAL
EOSINOPHIL # BLD: 0 K/UL (ref 0–0.4)
EOSINOPHIL NFR BLD: 0 % (ref 0–5)
ERYTHROCYTE [DISTWIDTH] IN BLOOD BY AUTOMATED COUNT: 13.8 % (ref 11.6–14.5)
EST. AVERAGE GLUCOSE BLD GHB EST-MCNC: 126 MG/DL
GLUCOSE BLD STRIP.AUTO-MCNC: 101 MG/DL (ref 70–110)
GLUCOSE BLD STRIP.AUTO-MCNC: 130 MG/DL (ref 70–110)
GLUCOSE BLD STRIP.AUTO-MCNC: 130 MG/DL (ref 70–110)
GLUCOSE BLD STRIP.AUTO-MCNC: 135 MG/DL (ref 70–110)
GLUCOSE SERPL-MCNC: 164 MG/DL (ref 70–110)
HBA1C MFR BLD: 6 % (ref 4–5.6)
HCT VFR BLD AUTO: 34.1 % (ref 35–45)
HGB BLD-MCNC: 10.9 G/DL (ref 12–16)
IMM GRANULOCYTES # BLD AUTO: 0 K/UL
IMM GRANULOCYTES NFR BLD AUTO: 0 %
LYMPHOCYTES # BLD: 1 K/UL (ref 0.9–3.6)
LYMPHOCYTES NFR BLD: 10 % (ref 21–52)
MCH RBC QN AUTO: 28.9 PG (ref 24–34)
MCHC RBC AUTO-ENTMCNC: 32 G/DL (ref 31–37)
MCV RBC AUTO: 90.5 FL (ref 74–97)
MONOCYTES # BLD: 0.2 K/UL (ref 0.05–1.2)
MONOCYTES NFR BLD: 2 % (ref 3–10)
NEUTS SEG # BLD: 8.7 K/UL (ref 1.8–8)
NEUTS SEG NFR BLD: 88 % (ref 40–73)
P-R INTERVAL, ECG05: 146 MS
PERFORMED BY, TECHID: ABNORMAL
PERFORMED BY, TECHID: NORMAL
PLATELET # BLD AUTO: 236 K/UL (ref 135–420)
PMV BLD AUTO: 9.6 FL (ref 9.2–11.8)
POTASSIUM SERPL-SCNC: 3.9 MMOL/L (ref 3.2–5.1)
Q-T INTERVAL, ECG07: 398 MS
QRS DURATION, ECG06: 94 MS
QTC CALCULATION (BEZET), ECG08: 448 MS
RBC # BLD AUTO: 3.77 M/UL (ref 4.2–5.3)
SARS-COV-2, COV2: NORMAL
SODIUM SERPL-SCNC: 135 MMOL/L (ref 135–145)
VENTRICULAR RATE, ECG03: 76 BPM
WBC # BLD AUTO: 9.9 K/UL (ref 4.6–13.2)

## 2021-02-26 PROCEDURE — 74011250637 HC RX REV CODE- 250/637: Performed by: ORTHOPAEDIC SURGERY

## 2021-02-26 PROCEDURE — 97530 THERAPEUTIC ACTIVITIES: CPT

## 2021-02-26 PROCEDURE — 74011250637 HC RX REV CODE- 250/637: Performed by: INTERNAL MEDICINE

## 2021-02-26 PROCEDURE — 74011000258 HC RX REV CODE- 258: Performed by: ORTHOPAEDIC SURGERY

## 2021-02-26 PROCEDURE — 74011250636 HC RX REV CODE- 250/636: Performed by: ORTHOPAEDIC SURGERY

## 2021-02-26 PROCEDURE — 97166 OT EVAL MOD COMPLEX 45 MIN: CPT

## 2021-02-26 PROCEDURE — 36415 COLL VENOUS BLD VENIPUNCTURE: CPT

## 2021-02-26 PROCEDURE — 74011250636 HC RX REV CODE- 250/636: Performed by: EMERGENCY MEDICINE

## 2021-02-26 PROCEDURE — U0003 INFECTIOUS AGENT DETECTION BY NUCLEIC ACID (DNA OR RNA); SEVERE ACUTE RESPIRATORY SYNDROME CORONAVIRUS 2 (SARS-COV-2) (CORONAVIRUS DISEASE [COVID-19]), AMPLIFIED PROBE TECHNIQUE, MAKING USE OF HIGH THROUGHPUT TECHNOLOGIES AS DESCRIBED BY CMS-2020-01-R: HCPCS

## 2021-02-26 PROCEDURE — 80048 BASIC METABOLIC PNL TOTAL CA: CPT

## 2021-02-26 PROCEDURE — 65270000029 HC RM PRIVATE

## 2021-02-26 PROCEDURE — 99218 HC RM OBSERVATION: CPT

## 2021-02-26 PROCEDURE — 85025 COMPLETE CBC W/AUTO DIFF WBC: CPT

## 2021-02-26 PROCEDURE — 83036 HEMOGLOBIN GLYCOSYLATED A1C: CPT

## 2021-02-26 PROCEDURE — 97161 PT EVAL LOW COMPLEX 20 MIN: CPT

## 2021-02-26 PROCEDURE — 74011250637 HC RX REV CODE- 250/637: Performed by: PHYSICIAN ASSISTANT

## 2021-02-26 PROCEDURE — 82962 GLUCOSE BLOOD TEST: CPT

## 2021-02-26 RX ORDER — HYDROCHLOROTHIAZIDE 25 MG/1
12.5 TABLET ORAL DAILY
Status: DISCONTINUED | OUTPATIENT
Start: 2021-02-26 | End: 2021-02-28 | Stop reason: HOSPADM

## 2021-02-26 RX ORDER — PANTOPRAZOLE SODIUM 40 MG/1
40 TABLET, DELAYED RELEASE ORAL
Status: DISCONTINUED | OUTPATIENT
Start: 2021-02-26 | End: 2021-02-28 | Stop reason: HOSPADM

## 2021-02-26 RX ADMIN — GABAPENTIN 200 MG: 100 CAPSULE ORAL at 09:06

## 2021-02-26 RX ADMIN — BUPROPION HYDROCHLORIDE 300 MG: 150 TABLET, FILM COATED, EXTENDED RELEASE ORAL at 06:33

## 2021-02-26 RX ADMIN — LOSARTAN POTASSIUM 50 MG: 25 TABLET, FILM COATED ORAL at 09:07

## 2021-02-26 RX ADMIN — Medication 10 ML: at 05:08

## 2021-02-26 RX ADMIN — CEFAZOLIN SODIUM 2 G: 1 INJECTION, POWDER, FOR SOLUTION INTRAMUSCULAR; INTRAVENOUS at 05:08

## 2021-02-26 RX ADMIN — HYDROMORPHONE HYDROCHLORIDE 2 MG: 2 TABLET ORAL at 09:20

## 2021-02-26 RX ADMIN — ASPIRIN 325 MG: 325 TABLET, COATED ORAL at 17:23

## 2021-02-26 RX ADMIN — HYDROMORPHONE HYDROCHLORIDE 2 MG: 2 TABLET ORAL at 17:50

## 2021-02-26 RX ADMIN — CEFAZOLIN SODIUM 2 G: 1 INJECTION, POWDER, FOR SOLUTION INTRAMUSCULAR; INTRAVENOUS at 21:35

## 2021-02-26 RX ADMIN — ATORVASTATIN CALCIUM 20 MG: 10 TABLET, FILM COATED ORAL at 09:06

## 2021-02-26 RX ADMIN — SODIUM CHLORIDE 150 ML/HR: 9 INJECTION, SOLUTION INTRAVENOUS at 17:29

## 2021-02-26 RX ADMIN — PANTOPRAZOLE SODIUM 40 MG: 40 TABLET, DELAYED RELEASE ORAL at 10:54

## 2021-02-26 RX ADMIN — OXYCODONE HYDROCHLORIDE AND ACETAMINOPHEN 500 MG: 500 TABLET ORAL at 09:10

## 2021-02-26 RX ADMIN — HYDROMORPHONE HYDROCHLORIDE 2 MG: 2 TABLET ORAL at 21:35

## 2021-02-26 RX ADMIN — Medication 10 ML: at 21:42

## 2021-02-26 RX ADMIN — ASPIRIN 325 MG: 325 TABLET, COATED ORAL at 09:07

## 2021-02-26 RX ADMIN — CEFAZOLIN SODIUM 2 G: 1 INJECTION, POWDER, FOR SOLUTION INTRAMUSCULAR; INTRAVENOUS at 12:55

## 2021-02-26 RX ADMIN — CITALOPRAM HYDROBROMIDE 20 MG: 20 TABLET ORAL at 09:10

## 2021-02-26 RX ADMIN — HYDROCODONE BITARTRATE AND ACETAMINOPHEN 2 TABLET: 5; 325 TABLET ORAL at 12:56

## 2021-02-26 NOTE — PROGRESS NOTES
Comprehensive Nutrition Assessment    Type and Reason for Visit: Initial    Nutrition Recommendations/Plan: continue Diabetic Diet with soft foods. Nutrition Assessment:  65 yo female PMH: DM, HTN, HLD, Depresssion and GERD   Pt tripped and fall at home. Pt with right foot pain found to have ankle fracture with left tibial and dorsal talus fracture pt was taken to [facility] for ankle ORIF. Pt is S/P right ankle ORIF likely will need SNF due to living alone pt is pending PT eval. Pt currently doing well.      Recent Results (from the past 24 hour(s))   URINALYSIS W/ RFLX MICROSCOPIC    Collection Time: 02/25/21  3:30 PM   Result Value Ref Range    Color Yellow/Straw      Appearance Clear Clear      Specific gravity 1.010 1.003 - 1.035      pH (UA) 7.0 5.0 - 9.0      Protein Negative Negative mg/dL    Glucose Negative Negative mg/dL    Ketone Negative Negative mg/dL    Bilirubin Negative Negative      Blood Negative Negative      Urobilinogen 0.2 0.2 - 1.0 EU/dL    Nitrites Negative Negative      Leukocyte Esterase Negative Negative     EKG, 12 LEAD, INITIAL    Collection Time: 02/25/21  3:38 PM   Result Value Ref Range    Ventricular Rate 76 BPM    Atrial Rate 77 BPM    P-R Interval 146 ms    QRS Duration 94 ms    Q-T Interval 398 ms    QTC Calculation (Bezet) 448 ms    Calculated P Axis 47 degrees    Calculated R Axis -48 degrees    Calculated T Axis -5 degrees    Diagnosis       Sinus rhythm  Left anterior fascicular block  Low voltage, precordial leads  Consider anterior infarct    [facility] by [id number] on 2/26/2021 10:26:11 AM     GLUCOSE, POC    Collection Time: 02/25/21  9:22 PM   Result Value Ref Range    Glucose (POC) 151 (H) 70 - 110 mg/dL    Performed by [address] [place] [address], [facility]    Collection Time: 02/26/21  6:00 AM   Result Value Ref Range    Sodium 135 135 - 145 mmol/L    Potassium 3.9 3.2 - 5.1 mmol/L    Chloride 103 94 - 111 mmol/L    CO2 24 21 - 33 mmol/L    Anion gap 8 mmol/L    Glucose 164 (H) 70 - 110 mg/dL    BUN 19 9 - 21 mg/dL    Creatinine 1.20 0.70 - 1.20 mg/dL    BUN/Creatinine ratio 16      GFR est AA 55 ml/min/1.73m2    GFR est non-AA 46 ml/min/1.73m2    Calcium 8.2 (L) 8.5 - 10.5 mg/dL   CBC WITH AUTOMATED DIFF    Collection Time: 02/26/21  6:00 AM   Result Value Ref Range    WBC 9.9 4.6 - 13.2 K/uL    RBC 3.77 (L) 4.20 - 5.30 M/uL    HGB 10.9 (L) 12.0 - 16.0 g/dL    HCT 34.1 (L) 35.0 - 45.0 %    MCV 90.5 74.0 - 97.0 FL    MCH 28.9 24.0 - 34.0 PG    MCHC 32.0 31.0 - 37.0 g/dL    RDW 13.8 11.6 - 14.5 %    PLATELET 551 368 - 659 K/uL    MPV 9.6 9.2 - 11.8 FL    NEUTROPHILS 88 (H) 40 - 73 %    LYMPHOCYTES 10 (L) 21 - 52 %    MONOCYTES 2 (L) 3 - 10 %    EOSINOPHILS 0 0 - 5 %    BASOPHILS 0 0 - 2 %    IMMATURE GRANULOCYTES 0 %    ABS. NEUTROPHILS 8.7 (H) 1.8 - 8.0 K/UL    ABS. LYMPHOCYTES 1.0 0.9 - 3.6 K/UL    ABS. MONOCYTES 0.2 0.05 - 1.2 K/UL    ABS. EOSINOPHILS 0.0 0.0 - 0.4 K/UL    ABS. BASOPHILS 0.0 0.0 - 0.1 K/UL    ABS. IMM. GRANS. 0.0 K/UL   GLUCOSE, POC    Collection Time: 02/26/21  9:31 AM   Result Value Ref Range    Glucose (POC) 135 (H) 70 - 110 mg/dL    Performed by Paulo Rosas, POC    Collection Time: 02/26/21 11:23 AM   Result Value Ref Range    Glucose (POC) 130 (H) 70 - 110 mg/dL    Performed by Lamine Boston        Malnutrition Assessment:  Malnutrition Status:  No malnutrition    Context:        Findings of the 6 clinical characteristics of malnutrition:   Energy Intake:     Weight Loss: Body Fat Loss:   ,     Muscle Mass Loss:   ,    Fluid Accumulation:   ,     Strength:            Estimated Daily Nutrient Needs:  Energy (kcal): 2195-6194 kcal/day; Weight Used for Energy Requirements: Admission(98 kg)  Protein (g): 78-98 g/day; Weight Used for Protein Requirements: Admission(0.8-1 g/kg)  Fluid (ml/day): 7522-5111 mL/day; Method Used for Fluid Requirements: 1 ml/kcal      Nutrition Related Findings:  Pt tripped and fall at home. Pt with right foot pain found to have ankle fracture with left tibial and dorsal talus fracture [facility]. Wounds:    Surgical incision(Right ankle ORIF)       Current Nutrition Therapies:  DIET DIABETIC CONSISTENT CARB Soft Solids    Anthropometric Measures:  · Height:  6' (182.9 cm)  · Current Body Wt:  97.5 kg (215 lb)   · Admission Body Wt:  215 lb    · Usual Body Wt:        · Ideal Body Wt:  160 lbs:  134.4 %   · Adjusted Body Weight:   ; Weight Adjustment for: No adjustment   · Adjusted BMI:       · BMI Category: Overweight (BMI 25.0-29. 9)       Nutrition Diagnosis:   · Increased nutrient needs related to other (specify)(maintain lean body mass and surgical wound healing) as evidenced by wounds(Pt S/P right ankle ORIF)      Nutrition Interventions:   Food and/or Nutrient Delivery: Continue current diet  Nutrition Education and Counseling: Education not indicated  Coordination of Nutrition Care: Continue to monitor while inpatient    Goals:  Pt to eat > 75% of meals, BM q 1-3 days, glucose        Nutrition Monitoring and Evaluation:   Behavioral-Environmental Outcomes:    Food/Nutrient Intake Outcomes: Food and nutrient intake  Physical Signs/Symptoms Outcomes: Biochemical data, Meal time behavior, Weight     F/U: 3/1/2021    Discharge Planning:    Continue current diet     Electronically signed by Sandor Rivero on 2/26/2021 at 3:22 PM    Contact: GISELL [phone]

## 2021-02-26 NOTE — PROGRESS NOTES
TRANSFER - IN REPORT:    Verbal report received from CHRIS Mondragno RN(name) on Robin Albarran  being received from PACU  (unit) for routine post - op      Report consisted of patients Situation, Background, Assessment and   Recommendations(SBAR). Information from the following report(s) SBAR, Kardex and MAR was reviewed with the receiving nurse. Opportunity for questions and clarification was provided. Assessment completed upon patients arrival to unit and care assumed.        SHIFT SUMMARY  Remained stable all shift without any acute changes in status noted,pain well controlled with po dilaudid,tolerating po intake well and vss

## 2021-02-26 NOTE — PROGRESS NOTES
Met with pt. She states that she lives alone and her DHARA is her only relative. She states that she does have steps to enter her home and also has steps to navigate inside her home. She is debating on whether to pursue SNF or go home with [facility]. Will follow pt's progress along with PT/OT and will inquire about bed space at her choice of SNF.

## 2021-02-26 NOTE — PROGRESS NOTES
Problem: Mobility Impaired (Adult and Pediatric)  Goal: *Acute Goals and Plan of Care (Insert Text)  Description: Physical Therapy Goals  Initiated 2/26/2021 and to be accomplished within 7 day(s)  1. Patient will move from supine to sit and sit to supine , scoot up and down, and roll side to side in bed with modified independence. 2.  Patient will transfer from bed to chair and chair to bed with modified independence using the least restrictive device. 3.  Patient will perform sit to stand with modified independence. 4.  Patient will ambulate with contact guard assist for 20 feet with the least restrictive device. 5.  Patient will ascend/descend 3 stairs with 2 handrail(s) with minimal assistance/contact guard assist.    PLOF: Community ambulator who did not use an AD and who was (I) with ADLs. Outcome: Progressing Towards Goal   PHYSICAL THERAPY EVALUATION    Patient: Alejandrina East [de-identified]64 y.o. female)  Date: 2/26/2021  Primary Diagnosis: Ankle fracture, bimalleolar, closed, right, initial encounter [S82.841A]  Procedure(s) (LRB):  RIGHT ANKLE OPEN REDUCTION INTERNAL FIXATION (Right) 1 Day Post-Op   Precautions:   NWB, WBAT(NWB R LE, WBAT in CAM boot for LLE)    ASSESSMENT :  Based on the objective data described below, the patient presents s/p fall with R ankle and L fibular/talar fx. She is currently NWB for the R LE after receiving an ORIF and she is WBAT in a CAM boot for the LLE. She performs mobility well during session with minimal pain reported as she was medicated prior to session. She is able to stand 2 times and she is able to hop forward and backwards. She then hops to a recliner at the bedside. During session therapist also discussed multiple options for discharge and equipment she would benefit from. She would benefit from further P.T. to improve strength, gait, and stair navigation. She would likely benefit from a skilled stay.     Patient will benefit from skilled intervention to address the above impairments. Patient's rehabilitation potential is considered to be Excellent  Factors which may influence rehabilitation potential include:   []         None noted  [x]         Mental ability/status  [x]         Medical condition  [x]         Home/family situation and support systems  [x]         Safety awareness  [x]         Pain tolerance/management  []         Other:      PLAN :  Recommendations and Planned Interventions:   [x]           Bed Mobility Training             []    Neuromuscular Re-Education  [x]           Transfer Training                   []    Orthotic/Prosthetic Training  [x]           Gait Training                          []    Modalities  [x]           Therapeutic Exercises           []    Edema Management/Control  [x]           Therapeutic Activities            []    Family Training/Education  [x]           Patient Education  []           Other (comment):    Frequency/Duration: Patient will be followed by physical therapy 1-2 times per day/4-7 days per week to address goals. Discharge Recommendations: Rene Roque  Further Equipment Recommendations for Discharge: wheelchair  and knee scooter     SUBJECTIVE:   Patient stated I don't want to hop too much and cause the pain to become too unbearable.     OBJECTIVE DATA SUMMARY:     Past Medical History:   Diagnosis Date    Diabetes ([facility] [address]     GERD (gastroesophageal reflux disease)     Hyperlipemia     Hypertension     Psychiatric disorder      Past Surgical History:   Procedure Laterality Date    HX CERVICAL FUSION       Barriers to Learning/Limitations: None  Compensate with: N/A  Home Situation:  Home Situation  Home Environment: Private residence  # Steps to Enter: 3  Rails to Enter: Yes  Hand Rails : Bilateral  One/Two Story Residence: [facility] [place] level  # of Interior Steps: 1  Interior Rails: None  Living Alone: Yes  Support Systems: Friends \ neighbors  Patient Expects to be Discharged to[de-identified] Unknown  Current DME Used/Available at Home: Crutches, Shower chair, Walker, rolling  Critical Behavior:  Neurologic State: Alert  Orientation Level: Oriented X4     Strength:    Strength: Generally decreased, functional                    RUE:4+/5  LUE:4+/5  RLE:3/5  LLE:4/5  Tone & Sensation:   Sensation: Intact    Range Of Motion:   AROM: Generally decreased, functional(R ankle limited from cast)    RUE: WFL  LUE:WFL  RLE:Ankle in cast  LLE:Ankle in CAM boot  Posture:  Posture (WDL): Within defined limits     Functional Mobility:  Bed Mobility:  Rolling: Modified independent  Supine to Sit: Contact guard assistance     Scooting: Contact guard assistance  Transfers:  Sit to Stand: Minimum assistance  Stand to Sit: Minimum assistance  Stand Pivot Transfers: Minimum assistance     Bed to Chair: Minimum assistance     Balance:   Sitting: Intact  Standing: Impaired  Standing - Static: Good  Standing - Dynamic : Good;Fair  Ambulation/Gait Training:  Distance (ft): 3 Feet (ft)(She maintains NWB well)  Assistive Device: Walker, rolling  Ambulation - Level of Assistance: Contact guard assistance     Gait Description (WDL): Exceptions to WDL     Right Side Weight Bearing: Non-weight bearing  Left Side Weight Bearing: As tolerated(with CAM boot)    Today's Tx:   Pt performing stands, hopping, and transfers to a recliner. Therapist also discusses options for d/c and equipment. Pain:  Pain level pre-treatment: 2/10   Pain level post-treatment: 2/10   Pain Location: Ankles  Pain Intervention(s) : Medication (see MAR); Rest, Ice, Repositioning  Response to intervention: Nurse notified, See doc flow    Activity Tolerance:   Fair/good  Please refer to the flowsheet for vital signs taken during this treatment.   After treatment:   [x]         Patient left in no apparent distress sitting up in chair  []         Patient left in no apparent distress in bed  [x]         Call bell left within reach  [x]         Nursing notified  []         Caregiver present  []         Bed alarm activated  []         SCDs applied    COMMUNICATION/EDUCATION:   [x]         Role of Physical Therapy in the acute care setting. []         Fall prevention education was provided and the patient/caregiver indicated understanding. [x]         Patient/family have participated as able in goal setting and plan of care. [x]         Patient/family agree to work toward stated goals and plan of care. []         Patient understands intent and goals of therapy, but is neutral about his/her participation. []         Patient is unable to participate in goal setting/plan of care: ongoing with therapy staff.  []         Other:     Thank you for this referral.  Radha Stanton, PT, DPT   Time Calculation: 43 mins

## 2021-02-26 NOTE — PROGRESS NOTES
HOSPITALIST PROGRESS NOTE  Ngoc Miguel MD, [address]         Daily Progress Note: 2/26/2021      Subjective:   Patient is alert and oriented x4. Sitting up in bed awaiting physical therapy Evaluation in room. No overnight fever/chills, chest pain, shortness of breath noted. Status post right ankle ORIF for displaced trimall fracture. Patient is currently nonweightbearing to the right lower extremity.   And she is weightbearing as tolerated with Cam boot in the left lower extremity    Medications reviewed  Current Facility-Administered Medications   Medication Dose Route Frequency    hydroCHLOROthiazide (HYDRODIURIL) tablet 12.5 mg  12.5 mg Oral DAILY    pantoprazole (PROTONIX) tablet 40 mg  40 mg Oral ACB    0.9% sodium chloride infusion  150 mL/hr IntraVENous CONTINUOUS    sodium chloride (NS) flush 5-40 mL  5-40 mL IntraVENous Q8H    sodium chloride (NS) flush 5-40 mL  5-40 mL IntraVENous PRN    acetaminophen (TYLENOL) tablet 650 mg  650 mg Oral Q4H PRN    HYDROcodone-acetaminophen (NORCO) 5-325 mg per tablet 2 Tab  2 Tab Oral Q4H PRN    HYDROmorphone (DILAUDID) tablet 2 mg  2 mg Oral Q4H PRN    aspirin delayed-release tablet 325 mg  325 mg Oral BID    ceFAZolin (ANCEF) 2 g in 0.9% sodium chloride 100 mL IVPB  2 g IntraVENous Q8H    insulin lispro (HUMALOG) injection   SubCUTAneous AC&HS    glucose chewable tablet 16 g  4 Tab Oral PRN    glucagon (GLUCAGEN) injection 1 mg  1 mg IntraMUSCular PRN    dextrose (D50W) injection syrg 12.5-25 g  25-50 mL IntraVENous PRN    ascorbic acid (vitamin C) (VITAMIN C) tablet 500 mg  500 mg Oral DAILY    atorvastatin (LIPITOR) tablet 20 mg  20 mg Oral DAILY    buPROPion XL (WELLBUTRIN XL) tablet 300 mg  300 mg Oral 7am    citalopram (CELEXA) tablet 20 mg  20 mg Oral DAILY    gabapentin (NEURONTIN) capsule 200 mg  200 mg Oral DAILY    losartan (COZAAR) tablet 50 mg  50 mg Oral DAILY       Review of Systems:   A comprehensive review of systems was negative except for that written in the HPI. Objective:   Physical Exam:     Visit Vitals  /61   Pulse 88   Temp 97.7 °F (36.5 °C)   Resp 15   Ht 6' (1.829 m)   Wt 97.5 kg (215 lb)   SpO2 92%   BMI 29.16 kg/m²    O2 Flow Rate (L/min): 2 l/min O2 Device: Room air    Temp (24hrs), Av.8 °F (36.6 °C), Min:96.8 °F (36 °C), Max:98.3 °F (36.8 °C)    No intake/output data recorded.  1901 -  0700  In: 7792 [P.O.:550; I.V.:1200]  Out: 825 [Urine:825]    General:  Alert, cooperative, no distress, appears stated age. Lungs:   Clear to auscultation bilaterally. Chest wall:  No tenderness or deformity. Heart:  Regular rate and rhythm, S1, S2 normal, no murmur, click, rub or gallop. Abdomen:   Soft, non-tender. Bowel sounds normal. No masses,  No organomegaly. Extremities: Extremities normal, atraumatic, no cyanosis or edema. Pulses: 2+ and symmetric all extremities. Skin: Skin color, texture, turgor normal. No rashes or lesions   Neurologic:  No gross sensory or motor deficits     Data Review:       Recent Days:  Recent Labs     21  0600 21  1435   WBC 9.9 9.7   HGB 10.9* 12.3   HCT 34.1* 37.3    281     Recent Labs     21  0600 21  1435    137   K 3.9 3.5    97   CO2 24 26   * 90   BUN 19 16   CREA 1.20 1.20   CA 8.2* 9.7   INR  --  0.9     No results for input(s): PH, PCO2, PO2, HCO3, FIO2 in the last 72 hours.     24 Hour Results:  Recent Results (from the past 24 hour(s))   SARS-COV-2    Collection Time: 21  3:20 PM   Result Value Ref Range    SARS-CoV-2 Please find results under separate order     COVID-19 RAPID TEST    Collection Time: 21  3:20 PM   Result Value Ref Range    Specimen source Nasopharyngeal      COVID-19 rapid test Not Detected Not Detected     URINALYSIS W/ RFLX MICROSCOPIC    Collection Time: 21  3:30 PM Result Value Ref Range    Color Yellow/Straw      Appearance Clear Clear      Specific gravity 1.010 1.003 - 1.035      pH (UA) 7.0 5.0 - 9.0      Protein Negative Negative mg/dL    Glucose Negative Negative mg/dL    Ketone Negative Negative mg/dL    Bilirubin Negative Negative      Blood Negative Negative      Urobilinogen 0.2 0.2 - 1.0 EU/dL    Nitrites Negative Negative      Leukocyte Esterase Negative Negative     EKG, 12 LEAD, INITIAL    Collection Time: 02/25/21  3:38 PM   Result Value Ref Range    Ventricular Rate 76 BPM    Atrial Rate 77 BPM    P-R Interval 146 ms    QRS Duration 94 ms    Q-T Interval 398 ms    QTC Calculation (Bezet) 448 ms    Calculated P Axis 47 degrees    Calculated R Axis -48 degrees    Calculated T Axis -5 degrees    Diagnosis       Sinus rhythm  Left anterior fascicular block  Low voltage, precordial leads  Consider anterior infarct    Confirmed by [facility]OW ([id number]) on 2/26/2021 10:26:11 AM     GLUCOSE, POC    Collection Time: 02/25/21  9:22 PM   Result Value Ref Range    Glucose (POC) 151 (H) 70 - 110 mg/dL    Performed by Mike Oconnor    METABOLIC PANEL, BASIC    Collection Time: 02/26/21  6:00 AM   Result Value Ref Range    Sodium 135 135 - 145 mmol/L    Potassium 3.9 3.2 - 5.1 mmol/L    Chloride 103 94 - 111 mmol/L    CO2 24 21 - 33 mmol/L    Anion gap 8 mmol/L    Glucose 164 (H) 70 - 110 mg/dL    BUN 19 9 - 21 mg/dL    Creatinine 1.20 0.70 - 1.20 mg/dL    BUN/Creatinine ratio 16      GFR est AA 55 ml/min/1.73m2    GFR est non-AA 46 ml/min/1.73m2    Calcium 8.2 (L) 8.5 - 10.5 mg/dL   CBC WITH AUTOMATED DIFF    Collection Time: 02/26/21  6:00 AM   Result Value Ref Range    WBC 9.9 4.6 - 13.2 K/uL    RBC 3.77 (L) 4.20 - 5.30 M/uL    HGB 10.9 (L) 12.0 - 16.0 g/dL    HCT 34.1 (L) 35.0 - 45.0 %    MCV 90.5 74.0 - 97.0 FL    MCH 28.9 24.0 - 34.0 PG    MCHC 32.0 31.0 - 37.0 g/dL    RDW 13.8 11.6 - 14.5 %    PLATELET 625 789 - 033 K/uL    MPV 9.6 9.2 - 11.8 FL    NEUTROPHILS 88 (H) 40 - 73 %    LYMPHOCYTES 10 (L) 21 - 52 %    MONOCYTES 2 (L) 3 - 10 %    EOSINOPHILS 0 0 - 5 %    BASOPHILS 0 0 - 2 %    IMMATURE GRANULOCYTES 0 %    ABS. NEUTROPHILS 8.7 (H) 1.8 - 8.0 K/UL    ABS. LYMPHOCYTES 1.0 0.9 - 3.6 K/UL    ABS. MONOCYTES 0.2 0.05 - 1.2 K/UL    ABS. EOSINOPHILS 0.0 0.0 - 0.4 K/UL    ABS. BASOPHILS 0.0 0.0 - 0.1 K/UL    ABS. IMM. GRANS. 0.0 K/UL   GLUCOSE, POC    Collection Time: 02/26/21  9:31 AM   Result Value Ref Range    Glucose (POC) 135 (H) 70 - 110 mg/dL    Performed by Cyn Blakely    GLUCOSE, POC    Collection Time: 02/26/21 11:23 AM   Result Value Ref Range    Glucose (POC) 130 (H) 70 - 110 mg/dL    Performed by Shankar Elder            Assessment/Plan:     Ankle fracture, bimalleolar  Left ankle with nondisplaced distal fibular fracture status post cam boot and weightbearing as tolerated. Right ankle with displaced trimalleolar fracture status post ORIF per orthopedic surgery and is nonweightbearing. Physical therapy evaluation pending. Patient lives alone and likely will require SNF placement for further rehab and physical therapy. Continue aspirin 325 mg twice daily for DVT prophylaxis per orthopedic surgery. Diabetes mellitus type 2  Accu-Chek with insulin coverage and monitor blood sugar. Metformin on hold.     Essential hypertension      Continue losartan     Hyperlipidemia  Continue statin     Chronic GERD  Continue omeprazole     Depression  Continue bupropion and escitalopram    DVT prophylaxis  Aspirin 325 mg twice daily per orthopedic surgery. Care Plan discussed with: Patient/Family, Nursing. Total time spent with patient: 30 minutes. With greater than 50% spent in coordination of care and counseling.     Gamaliel Begum MD

## 2021-02-26 NOTE — H&P
History & Physical    Primary Care Provider: None  Source of Information: Patient     History of Presenting Illness:   Alejandrina East is a 64 y.o. female with past medical history of diabetes type 2 hypertension mixed hyperlipidemia depression presented to the ER after a fall complaint of right foot pain. Patient claimed that she tripped and fall landing on her right leg. She denies any head trauma or any LOC. she was brought to the hospital and x-ray show multiple fractures including fracture right ankle left tibial and dorsal talus fracture. She was taken to the OR immediately by orthopedic before we were able to see this patient in the ER. Now she is post surgery and doing well no shortness of breath no chest pain no headache dizziness no nausea vomiting observe for minimal pain lower extremities. She is being admitted in ICU postop. Her pain is very minimal on the right lower extremity. She is being  admitted for several lower extremity fractures  to ICU for overnight observation post surgery. Past Medical History:   Diagnosis Date    Diabetes ([address]     GERD (gastroesophageal reflux disease)     Hyperlipemia     Hypertension     Psychiatric disorder       Past Surgical History:   Procedure Laterality Date    HX CERVICAL FUSION       Prior to Admission medications    Medication Sig Start Date End Date Taking? Authorizing Provider   loratadine 10 mg cap Take 10 mg by mouth. Yes Other, MD Leo   losartan (COZAAR) 50 mg tablet Take 50 mg by mouth daily. Yes Leo Leo MD   hydroCHLOROthiazide (MICROZIDE) 12.5 mg capsule Take 12.5 mg by mouth daily. Yes Other, MD Leo   Omeprazole delayed release (PRILOSEC D/R) 20 mg tablet Take 20 mg by mouth daily. Yes Felipa, MD Leo   multivitamin (ONE A DAY) tablet Take 1 Tab by mouth daily. Yes Felipa, MD Leo   calcium-cholecalciferol, d3, (CALCIUM 600 + D) 600-125 mg-unit tab Take 600 mg by mouth.    Yes Other, MD Leo   ascorbic acid, vitamin C, (Vitamin C) 500 mg tablet Take 500 mg by mouth daily. Yes Leo Leo MD   metFORMIN (GLUCOPHAGE) 500 mg tablet Take 500 mg by mouth daily (with breakfast). Yes Felipa, MD Leo   atorvastatin (LIPITOR) 20 mg tablet Take 20 mg by mouth daily. Yes Felipa, MD Leo   gabapentin (NEURONTIN) 100 mg capsule Take 200 mg by mouth daily. Yes Leo Leo MD   buPROPion XL (WELLBUTRIN XL) 300 mg XL tablet Take 300 mg by mouth every morning. Yes Provider, Historical   escitalopram oxalate (LEXAPRO) 20 mg tablet Take 20 mg by mouth daily. Yes Provider, Historical   cephALEXin (KEFLEX) 500 mg capsule Take 1 Cap by mouth four (4) times daily. 2/25/21   Ronny Claudio MD   oxyCODONE-acetaminophen (Percocet) 5-325 mg per tablet Take 1 Tab by mouth every four to six (4-6) hours as needed for Pain for up to 14 days. Max Daily Amount: 6 Tabs. 2/25/21 3/11/21  Ronny Claudio MD     No Known Allergies      Family history significant for diabetes    SOCIAL HISTORY:  Patient resides: At home with family and used to smoke and drink long time ago quit both 1997. No history of drug use    Review of Systems:  Positive right ankle  pain left leg pain. Denies shortness of breath chest pain nausea vomiting headache dizziness. All the other review of systems are normal    Objective:     Physical Exam:     Visit Vitals  /63 (BP 1 Location: Left upper arm, BP Patient Position: At rest)   Pulse 80   Temp 97.5 °F (36.4 °C)   Resp 16   Ht 6' (1.829 m)   Wt 97.5 kg (215 lb)   SpO2 93%   BMI 29.16 kg/m²    O2 Flow Rate (L/min): 2 l/min O2 Device: Nasal cannula    General:  Alert, cooperative, no distress, appears stated age. Head:  Normocephalic, without obvious abnormality, atraumatic. Eyes:  Conjunctivae/corneas clear. PERRL, EOMs intact. Nose: Nares normal. Septum midline. Mucosa normal. No drainage or sinus tenderness.    Throat: Lips, mucosa, and tongue normal. Teeth and gums normal.   Neck: Supple, symmetrical, trachea midline,no carotid bruit and no JVD. Back:   Symmetric, no curvature. ROM normal. No CVA tenderness. Lungs:   Clear to auscultation bilaterally. No wheezes rales or rhonchi   Chest wall:  No tenderness or deformity. Heart:  Regular rate and rhythm, S1, S2 normal, no murmur, click, rub or gallop. Abdomen:   Soft, non-tender. Bowel sounds normal. No masses,  No organomegaly. Extremities:  Right lower extremity heavy dressing post surgery able to move toes. Pulses present. Left lower extremity soft splint holding extremity in place. Right lower extremity edema       Skin: Skin color, texture, turgor normal. No rashes or lesions   Neurologic: CNII-XII intact. No motor or sensory deficits. EKG: Review showed normal sinus rhythm rate 76      Data Review:     Recent Days:  Recent Labs     02/25/21  1435   WBC 9.7   HGB 12.3   HCT 37.3        Recent Labs     02/25/21  1435      K 3.5   CL 97   CO2 26   GLU 90   BUN 16   CREA 1.20   CA 9.7   INR 0.9     No results for input(s): PH, PCO2, PO2, HCO3, FIO2 in the last 72 hours. 24 Hour Results:  Recent Results (from the past 24 hour(s))   CBC WITH AUTOMATED DIFF    Collection Time: 02/25/21  2:35 PM   Result Value Ref Range    WBC 9.7 4.6 - 13.2 K/uL    RBC 4.20 4. 20 - 5.30 M/uL    HGB 12.3 12.0 - 16.0 g/dL    HCT 37.3 35.0 - 45.0 %    MCV 88.8 74.0 - 97.0 FL    MCH 29.3 24.0 - 34.0 PG    MCHC 33.0 31.0 - 37.0 g/dL    RDW 13.5 11.6 - 14.5 %    PLATELET 738 622 - 038 K/uL    MPV 9.8 9.2 - 11.8 FL    NEUTROPHILS 61 40 - 73 %    LYMPHOCYTES 30 21 - 52 %    MONOCYTES 8 3 - 10 %    EOSINOPHILS 1 0 - 5 %    BASOPHILS 0 0 - 2 %    IMMATURE GRANULOCYTES 0 %    ABS. NEUTROPHILS 6.0 1.8 - 8.0 K/UL    ABS. LYMPHOCYTES 2.9 0.9 - 3.6 K/UL    ABS. MONOCYTES 0.8 0.05 - 1.2 K/UL    ABS. EOSINOPHILS 0.1 0.0 - 0.4 K/UL    ABS. BASOPHILS 0.0 0.0 - 0.1 K/UL    ABS. IMM.  GRANS. 0.0 K/UL   METABOLIC PANEL, BASIC Collection Time: 02/25/21  2:35 PM   Result Value Ref Range    Sodium 137 135 - 145 mmol/L    Potassium 3.5 3.2 - 5.1 mmol/L    Chloride 97 94 - 111 mmol/L    CO2 26 21 - 33 mmol/L    Anion gap 14 mmol/L    Glucose 90 70 - 110 mg/dL    BUN 16 9 - 21 mg/dL    Creatinine 1.20 0.70 - 1.20 mg/dL    BUN/Creatinine ratio 13      GFR est AA 55 ml/min/1.73m2    GFR est non-AA 46 ml/min/1.73m2    Calcium 9.7 8.5 - 10.5 mg/dL   TYPE & SCREEN    Collection Time: 02/25/21  2:35 PM   Result Value Ref Range    Crossmatch Expiration 02/28/2021,2359     ABO/Rh(D) A Positive     Antibody screen Negative    PROTHROMBIN TIME + INR    Collection Time: 02/25/21  2:35 PM   Result Value Ref Range    Prothrombin time 12.0 11.5 - 15.2 sec    INR 0.9 0.8 - 1.2     PTT    Collection Time: 02/25/21  2:35 PM   Result Value Ref Range    aPTT 24.8 23.0 - 36.4 sec    aPTT, therapeutic range   68 - 109 sec   SARS-COV-2    Collection Time: 02/25/21  3:20 PM   Result Value Ref Range    SARS-CoV-2 Please find results under separate order     COVID-19 RAPID TEST    Collection Time: 02/25/21  3:20 PM   Result Value Ref Range    Specimen source Nasopharyngeal      COVID-19 rapid test Not Detected Not Detected     URINALYSIS W/ RFLX MICROSCOPIC    Collection Time: 02/25/21  3:30 PM   Result Value Ref Range    Color Yellow/Straw      Appearance Clear Clear      Specific gravity 1.010 1.003 - 1.035      pH (UA) 7.0 5.0 - 9.0      Protein Negative Negative mg/dL    Glucose Negative Negative mg/dL    Ketone Negative Negative mg/dL    Bilirubin Negative Negative      Blood Negative Negative      Urobilinogen 0.2 0.2 - 1.0 EU/dL    Nitrites Negative Negative      Leukocyte Esterase Negative Negative     EKG, 12 LEAD, INITIAL    Collection Time: 02/25/21  3:38 PM   Result Value Ref Range    Ventricular Rate 76 BPM    Atrial Rate 77 BPM    P-R Interval 146 ms    QRS Duration 94 ms    Q-T Interval 398 ms    QTC Calculation (Bezet) 448 ms    Calculated P Axis 47 degrees    Calculated R Axis -48 degrees    Calculated T Axis -5 degrees    Diagnosis       Sinus rhythm  Left anterior fascicular block  Low voltage, precordial leads  Consider anterior infarct     GLUCOSE, POC    Collection Time: 02/25/21  9:22 PM   Result Value Ref Range    Glucose (POC) 151 (H) 70 - 110 mg/dL    Performed by Mike Oconnor          Imaging: CXR review negative for infiltrate    Assessment  and Plan                 Principal Problem:    Ankle fracture, bimalleolar, closed, right, initial encounter (2/25/2021)      Overview: Status post open reduction. Management per orthopedic    Active Problems:    Acute right ankle pain (2/25/2021)      Overview: Secondary to fracture. Continue pain medication as needed      Left tibial fracture (2/25/2021)      Overview: Soft splint per orthopedic. Management per orthopedic      S/P surgical manipulation of ankle joint (2/25/2021)      Overview: Management par orthopedic      Talus fracture (2/25/2021)      Overview: Management per orthopedic      Diabetes mellitus type 2, controlled ([facility] [address] (2/25/2021)      Overview: Accu-Chek with insulin coverage and monitor blood sugar. Continue       Metformin      Essential hypertension (2/25/2021)      Overview: BP stable continue losartan      Hyperlipidemia, mixed (2/25/2021)      Overview: Continue statin      Chronic GERD (2/25/2021)      Overview: Continue omeprazole      Depression, major (2/25/2021)      Overview: Continue bupropion and escitalopram    She is clinically stable at present. This is not an ICU admission except for patient being held in observation overnight. I will reorder all of medications. Discussed with ICU nurse. Time spent 45 minutes.  will order labs for the morning             Signed By: Charlie Ibrahim PA-C     February 25, 2021

## 2021-02-26 NOTE — PROGRESS NOTES
Resumed pt care pt resting bed c/o pain in rt post surgeryleg. splint on leg intact ,toes warm to touch and sensation in both feet present. Assessment  done.

## 2021-02-26 NOTE — PROGRESS NOTES
Reason for Admission:   Fall sustaining closed fractrue of right and left ankle. RUR Score: 12%                    Plan for utilizing home health: To be determined      PCP: First and Last name:     Name of Practice:    Are you a current patient: Yes/No:    Approximate date of last visit:    Can you participate in a virtual visit with your PCP:                     Current Advanced Directive/Advance Care Plan:  Not on file, but has advanced directive. Healthcare Decision Maker:   Click here to complete Amen. including selection of the Healthcare Decision Maker Relationship (ie \"Primary\")                         Transition of Care Plan:   Based on pt's progress in PT OT as she lives alone. Her DHARA is her only relative.

## 2021-02-26 NOTE — PROGRESS NOTES
OCCUPATIONAL THERAPY EVALUATION    Patient: Tim Barahona [de-identified]64 y.o. female)  Date: 2/26/2021  Primary Diagnosis: Ankle fracture, bimalleolar, closed, right, initial encounter [S82.488P]  Procedure(s) (LRB):  RIGHT ANKLE OPEN REDUCTION INTERNAL FIXATION (Right) 1 Day Post-Op   Precautions:  NWB, WBAT(NWB R LE, WBAT in CAM boot for LLE)  PLOF: lives at home alone in 1 story home and has been I with all ADLs and mobility     ASSESSMENT :  Based on the objective data described below, the patient presents with declines in ADLs and mobility. Patient will benefit from skilled intervention to address the above impairments. Patient's rehabilitation potential is considered to be Good  Factors which may influence rehabilitation potential include:   []             None noted  [x]             Mental ability/status  []             Medical condition  []             Home/family situation and support systems  []             Safety awareness  []             Pain tolerance/management  []             Other:      PLAN :  Recommendations and Planned Interventions:   [x]               Self Care Training                  [x]      Therapeutic Activities  [x]               Functional Mobility Training   []      Cognitive Retraining  [x]               Therapeutic Exercises           []      Endurance Activities  []               Balance Training                    []      Neuromuscular Re-Education  []               Visual/Perceptual Training     []      Home Safety Training  [x]               Patient Education                   []      Family Training/Education  []               Other (comment):    Frequency/Duration: Patient will be followed by occupational therapy 4 times a week to address goals. Discharge Recommendations: [facility]   Further Equipment Recommendations for Discharge: rolling walker and N/A     SUBJECTIVE:   Patient stated Mace Folk think I want to just go home.  Arron Hickman been trying to be so careful with covid    OBJECTIVE DATA SUMMARY:     Past Medical History:   Diagnosis Date    Diabetes ([address]     GERD (gastroesophageal reflux disease)     Hyperlipemia     Hypertension     Psychiatric disorder      Past Surgical History:   Procedure Laterality Date    HX CERVICAL FUSION       Barriers to Learning/Limitations: None  Compensate with: visual, verbal, tactile, kinesthetic cues/model    Home Situation:   Home Situation  Home Environment: Private residence  # Steps to Enter: 3  Rails to Enter: Yes  Hand Rails : Bilateral  One/Two Story Residence: [facility] level  # of Interior Steps: 1  Interior Rails: None  Living Alone: Yes  Support Systems: Friends \ neighbors  Patient Expects to be Discharged to[de-identified] Unknown  Current DME Used/Available at Home: Crutches, Shower chair, Walker, rolling  []  Right hand dominant   []  Left hand dominant    Cognitive/Behavioral Status:  Neurologic State: Alert  Orientation Level: Oriented X4          Skin: R ankle surgical incision   Edema:B LE edema     Vision/Perceptual:            WFLs                          Coordination: BUE   intact            Balance:  Sitting: Intact  Standing: Impaired  Standing - Static: Good  Standing - Dynamic : Good;Fair    Strength: BUE    Strength: Generally decreased, functional                Tone & Sensation: BUE       Sensation: Intact                      Range of Motion: BUE    AROM: Generally decreased, functional(R ankle limited from cast)                         Functional Mobility and Transfers for ADLs:  Bed Mobility:  Rolling: Modified independent  Supine to Sit: Contact guard assistance     Scooting: Contact guard assistance  Transfers:  Sit to Stand: Minimum assistance  Stand to Sit: Minimum assistance  Stand Pivot Transfers: Minimum assistance  Bed to Chair: Minimum assistance                           ADL Assessment:     Pt required some assist with LE ADLs.  Home safety recommendations discussed with pt verbalizing understanding Pain:  Pain level pre-treatment: 4/10   Pain level post-treatment: 4/10   Pain Intervention(s): Medication (see MAR); Rest, Ice, Repositioning  Response to intervention: Nurse notified, See doc flow    Activity Tolerance:   Good     Please refer to the flowsheet for vital signs taken during this treatment. After treatment:   [x] Patient left in no apparent distress sitting up in chair  [] Patient left in no apparent distress in bed  [x] Call bell left within reach  [x] Nursing notified  [] Caregiver present  [] Bed alarm activated    COMMUNICATION/EDUCATION:   [x] Role of Occupational Therapy in the acute care setting  [x] Home safety education was provided and the patient/caregiver indicated understanding. [x] Patient/family have participated as able in goal setting and plan of care. [] Patient/family agree to work toward stated goals and plan of care. [] Patient understands intent and goals of therapy, but is neutral about his/her participation. [] Patient is unable to participate in goal setting and plan of care. Thank you for this referral.  Shefali Solis MS, OTR/L        Debby Complexity: History: MEDIUM Complexity : Expanded review of history including physical, cognitive and psychosocial  history ; Examination: MEDIUM Complexity : 3-5 performance deficits relating to physical, cognitive , or psychosocial skils that result in activity limitations and / or participation restrictions; Decision Making:MEDIUM Complexity : Patient may present with comorbidities that affect occupational performnce.  Miniml to moderate modification of tasks or assistance (eg, physical or verbal ) with assesment(s) is necessary to enable patient to complete evaluation

## 2021-02-26 NOTE — PROGRESS NOTES
Patient is not available to be assessed at this time.       [id number] [facility].   [phone]

## 2021-02-26 NOTE — PERIOP NOTES
TRANSFER - OUT REPORT:    Verbal report given to NISA Tobias on Heavenly Caro  being transferred to ICU 3 for routine post - op       Report consisted of patients Situation, Background, Assessment and   Recommendations(SBAR). Information from the following report(s) SBAR, OR Summary, Procedure Summary, Intake/Output, MAR, Med Rec Status and Quality Measures was reviewed with the receiving nurse. Lines:   Peripheral IV 02/25/21 Right Antecubital (Active)   Site Assessment Clean, dry, & intact 02/25/21 1857   Phlebitis Assessment 0 02/25/21 1857   Infiltration Assessment 0 02/25/21 1857   Dressing Status Clean, dry, & intact 02/25/21 1857   Dressing Type Transparent 02/25/21 1857   Hub Color/Line Status Pink; Infusing;Patent 02/25/21 1857   Alcohol Cap Used No 02/25/21 1857        Opportunity for questions and clarification was provided.       Patient transported with:   Registered Nurse

## 2021-02-27 LAB
GLUCOSE BLD STRIP.AUTO-MCNC: 100 MG/DL (ref 70–110)
GLUCOSE BLD STRIP.AUTO-MCNC: 107 MG/DL (ref 70–110)
GLUCOSE BLD STRIP.AUTO-MCNC: 93 MG/DL (ref 70–110)
GLUCOSE BLD STRIP.AUTO-MCNC: 96 MG/DL (ref 70–110)
PERFORMED BY, TECHID: NORMAL

## 2021-02-27 PROCEDURE — 97110 THERAPEUTIC EXERCISES: CPT

## 2021-02-27 PROCEDURE — 65270000029 HC RM PRIVATE

## 2021-02-27 PROCEDURE — 74011250636 HC RX REV CODE- 250/636: Performed by: ORTHOPAEDIC SURGERY

## 2021-02-27 PROCEDURE — 97530 THERAPEUTIC ACTIVITIES: CPT

## 2021-02-27 PROCEDURE — 82962 GLUCOSE BLOOD TEST: CPT

## 2021-02-27 PROCEDURE — 74011250637 HC RX REV CODE- 250/637: Performed by: INTERNAL MEDICINE

## 2021-02-27 PROCEDURE — 74011250637 HC RX REV CODE- 250/637: Performed by: ORTHOPAEDIC SURGERY

## 2021-02-27 PROCEDURE — 74011250637 HC RX REV CODE- 250/637: Performed by: PHYSICIAN ASSISTANT

## 2021-02-27 PROCEDURE — 74011250636 HC RX REV CODE- 250/636: Performed by: EMERGENCY MEDICINE

## 2021-02-27 PROCEDURE — 74011000258 HC RX REV CODE- 258: Performed by: ORTHOPAEDIC SURGERY

## 2021-02-27 RX ADMIN — Medication 10 ML: at 17:32

## 2021-02-27 RX ADMIN — HYDROMORPHONE HYDROCHLORIDE 2 MG: 2 TABLET ORAL at 21:23

## 2021-02-27 RX ADMIN — LOSARTAN POTASSIUM 50 MG: 25 TABLET, FILM COATED ORAL at 08:06

## 2021-02-27 RX ADMIN — SODIUM CHLORIDE 150 ML/HR: 9 INJECTION, SOLUTION INTRAVENOUS at 01:50

## 2021-02-27 RX ADMIN — HYDROCODONE BITARTRATE AND ACETAMINOPHEN 2 TABLET: 5; 325 TABLET ORAL at 17:32

## 2021-02-27 RX ADMIN — CEFAZOLIN SODIUM 2 G: 1 INJECTION, POWDER, FOR SOLUTION INTRAMUSCULAR; INTRAVENOUS at 13:14

## 2021-02-27 RX ADMIN — OXYCODONE HYDROCHLORIDE AND ACETAMINOPHEN 500 MG: 500 TABLET ORAL at 08:05

## 2021-02-27 RX ADMIN — ATORVASTATIN CALCIUM 20 MG: 10 TABLET, FILM COATED ORAL at 08:05

## 2021-02-27 RX ADMIN — HYDROCODONE BITARTRATE AND ACETAMINOPHEN 2 TABLET: 5; 325 TABLET ORAL at 10:23

## 2021-02-27 RX ADMIN — ASPIRIN 325 MG: 325 TABLET, COATED ORAL at 08:05

## 2021-02-27 RX ADMIN — ASPIRIN 325 MG: 325 TABLET, COATED ORAL at 17:32

## 2021-02-27 RX ADMIN — Medication 10 ML: at 21:20

## 2021-02-27 RX ADMIN — CEFAZOLIN SODIUM 2 G: 1 INJECTION, POWDER, FOR SOLUTION INTRAMUSCULAR; INTRAVENOUS at 05:03

## 2021-02-27 RX ADMIN — Medication 10 ML: at 05:03

## 2021-02-27 RX ADMIN — CITALOPRAM HYDROBROMIDE 20 MG: 20 TABLET ORAL at 08:06

## 2021-02-27 RX ADMIN — BUPROPION HYDROCHLORIDE 300 MG: 150 TABLET, FILM COATED, EXTENDED RELEASE ORAL at 08:05

## 2021-02-27 RX ADMIN — PANTOPRAZOLE SODIUM 40 MG: 40 TABLET, DELAYED RELEASE ORAL at 07:49

## 2021-02-27 NOTE — ROUTINE PROCESS
Assumed care of pt. Pt resting comfortably in bed with eyes closed. Pt shows no signs of distress and has no c/o pain or needs at this time. Splint and ace wrap to RLE clean, dry and intact. Immobilization boot LLE. CBWR, 2 side rails up and bed locked in lowest position. 0730 Assessment completed. Pt sitting in bed eating breakfast at this time. CBWR, 2 side rails up and bed locked in lowest position. 1155 Pt sitting in recliner eating lunch after working with PT. Pt shows no signs of distress and has no c/o pain or needs at this time. CBWR.    1905 Verbal shift change report given to DANIEL Cleaning RN (oncoming nurse) by NUZHAT Rios RN (offgoing nurse). Report included the following information SBAR, Kardex, Intake/Output, MAR, Accordion and Recent Results.

## 2021-02-27 NOTE — PROGRESS NOTES
Problem: Falls - Risk of  Goal: *Absence of Falls  Description: Document Elias Medellin Fall Risk and appropriate interventions in the flowsheet. Outcome: Progressing Towards Goal  Note: Fall Risk Interventions:  Mobility Interventions: Bed/chair exit alarm         Medication Interventions: Patient to call before getting OOB    Elimination Interventions: Call light in reach    History of Falls Interventions: Room close to nurse's station         Problem: Pressure Injury - Risk of  Goal: *Prevention of pressure injury  Description: Document Yuval Scale and appropriate interventions in the flowsheet. Outcome: Progressing Towards Goal  Note: Pressure Injury Interventions: Activity Interventions: PT/OT evaluation    Mobility Interventions: PT/OT evaluation    Nutrition Interventions: Document food/fluid/supplement intake    Friction and Shear Interventions: Feet elevated on foot rest                Problem: Nutrition Deficit  Goal: *Optimize nutritional status  Outcome: Progressing Towards Goal     Problem: Risk for Spread of Infection  Goal: Prevent transmission of infectious organism to others  Description: Prevent the transmission of infectious organisms to other patients, staff members, and visitors.   Outcome: Progressing Towards Goal     Problem: Lower Extremity Fracture:Day of Admission  Goal: Activity/Safety  Outcome: Progressing Towards Goal  Goal: Diagnostic Test/Procedures  Outcome: Progressing Towards Goal  Goal: Nutrition/Diet  Outcome: Progressing Towards Goal  Goal: Medications  Outcome: Progressing Towards Goal  Goal: Respiratory  Outcome: Progressing Towards Goal  Goal: Treatments/Interventions/Procedures  Outcome: Progressing Towards Goal  Goal: *Optimal pain control at patient's stated goal  Outcome: Progressing Towards Goal  Goal: *Hemodynamically stable  Outcome: Progressing Towards Goal  Goal: *Adequate oxygenation  Outcome: Progressing Towards Goal

## 2021-02-27 NOTE — PROGRESS NOTES
HOSPITALIST PROGRESS NOTE  Yusuf Carmona MD, [address]         Daily Progress Note: 2/27/2021      Subjective:   Patient is alert and oriented x4. Sitting up in bed awaiting physical therapy Evaluation in room. No overnight fever/chills, chest pain, shortness of breath noted. Patient really not able to participate in physical therapy on 2/26/2021 secondary to pain and nonweightbearing status in the left lower extremity and wearing cam boot in right lower extremity. Status post right ankle ORIF for displaced trimall fracture. Patient is currently nonweightbearing to the right lower extremity. And she is weightbearing as tolerated with Cam boot in the left lower extremity    She does endorse wanting to go home since she has plenty of support at home. However would like an additional 24 hours to work with physical therapy to feel more comfortable being safe at home given right ankle ORIF and left lower extremity in cam boot.     Medications reviewed  Current Facility-Administered Medications   Medication Dose Route Frequency    hydroCHLOROthiazide (HYDRODIURIL) tablet 12.5 mg  12.5 mg Oral DAILY    pantoprazole (PROTONIX) tablet 40 mg  40 mg Oral ACB    0.9% sodium chloride infusion  150 mL/hr IntraVENous CONTINUOUS    sodium chloride (NS) flush 5-40 mL  5-40 mL IntraVENous Q8H    sodium chloride (NS) flush 5-40 mL  5-40 mL IntraVENous PRN    acetaminophen (TYLENOL) tablet 650 mg  650 mg Oral Q4H PRN    HYDROcodone-acetaminophen (NORCO) 5-325 mg per tablet 2 Tab  2 Tab Oral Q4H PRN    HYDROmorphone (DILAUDID) tablet 2 mg  2 mg Oral Q4H PRN    aspirin delayed-release tablet 325 mg  325 mg Oral BID    ceFAZolin (ANCEF) 2 g in 0.9% sodium chloride 100 mL IVPB  2 g IntraVENous Q8H    insulin lispro (HUMALOG) injection   SubCUTAneous AC&HS    glucose chewable tablet 16 g  4 Tab Oral PRN    glucagon (GLUCAGEN) injection 1 mg  1 mg IntraMUSCular PRN    dextrose (D50W) injection syrg 12.5-25 g  25-50 mL IntraVENous PRN    ascorbic acid (vitamin C) (VITAMIN C) tablet 500 mg  500 mg Oral DAILY    atorvastatin (LIPITOR) tablet 20 mg  20 mg Oral DAILY    buPROPion XL (WELLBUTRIN XL) tablet 300 mg  300 mg Oral 7am    citalopram (CELEXA) tablet 20 mg  20 mg Oral DAILY    gabapentin (NEURONTIN) capsule 200 mg  200 mg Oral DAILY    losartan (COZAAR) tablet 50 mg  50 mg Oral DAILY       Review of Systems:   A comprehensive review of systems was negative except for that written in the HPI. Objective:   Physical Exam:     Visit Vitals  /65 (BP 1 Location: Left upper arm, BP Patient Position: At rest)   Pulse 81   Temp 98.2 °F (36.8 °C)   Resp 18   Ht 6' (1.829 m)   Wt 111.1 kg (245 lb)   SpO2 96%   BMI 33.23 kg/m²    O2 Flow Rate (L/min): 2 l/min O2 Device: Room air    Temp (24hrs), Av.7 °F (36.5 °C), Min:96.9 °F (36.1 °C), Max:98.2 °F (36.8 °C)     07 -  1900  In: -   Out: 200 [Urine:200]   1901 -  0700  In: 4700 [P.O.:2150; I.V.:2550]  Out: 4575 [Urine:4575]    General:  Alert, cooperative, no distress, appears stated age. Lungs:   Clear to auscultation bilaterally. Chest wall:  No tenderness or deformity. Heart:  Regular rate and rhythm, S1, S2 normal, no murmur, click, rub or gallop. Abdomen:   Soft, non-tender. Bowel sounds normal. No masses,  No organomegaly. Extremities:  Left lower leg in cam boot, right lower extremity bandaged . no cyanosis or edema. Pulses: 2+ and symmetric all extremities.    Skin: Skin color, texture, turgor normal. No rashes or lesions   Neurologic:  No gross sensory or motor deficits     Data Review:       Recent Days:  Recent Labs     21  0600 21  1435   WBC 9.9 9.7   HGB 10.9* 12.3   HCT 34.1* 37.3    281     Recent Labs     21  0600 21  1435    137   K 3.9 3.5    97   CO2 24 26   * 90 BUN 19 16   CREA 1.20 1.20   CA 8.2* 9.7   INR  --  0.9     No results for input(s): PH, PCO2, PO2, HCO3, FIO2 in the last 72 hours. 24 Hour Results:  Recent Results (from the past 24 hour(s))   GLUCOSE, POC    Collection Time: 02/26/21  4:49 PM   Result Value Ref Range    Glucose (POC) 101 70 - 110 mg/dL    Performed by Nelson Cross    SARS-COV-2    Collection Time: 02/26/21  7:45 PM   Result Value Ref Range    SARS-CoV-2 Please find results under separate order     GLUCOSE, POC    Collection Time: 02/26/21  9:27 PM   Result Value Ref Range    Glucose (POC) 130 (H) 70 - 110 mg/dL    Performed by Anastacia Kohler    GLUCOSE, POC    Collection Time: 02/27/21  7:29 AM   Result Value Ref Range    Glucose (POC) 100 70 - 110 mg/dL    Performed by [id number], POC    Collection Time: 02/27/21 11:39 AM   Result Value Ref Range    Glucose (POC) 93 70 - 110 mg/dL    Performed by Rose Nelson            Assessment/Plan:     Ankle fracture, bimalleolar  Left ankle with nondisplaced distal fibular fracture status post cam boot and weightbearing as tolerated. Right ankle with displaced trimalleolar fracture status post ORIF per orthopedic surgery and is nonweightbearing. Minimally tolerating physical therapy and requires an additional further work with physical therapy for the next 24 to 48 hours, prior to being discharged home. Patient lives alone however now states that she will be able to manage with home health and physical therapy coming at home as she has plenty of social support and caregivers at home. Continue aspirin 325 mg twice daily for DVT prophylaxis per orthopedic surgery. Given nonweightbearing status in the right lower extremity in Cam boot in the left lower extremity patient will require Rollator walker for assistance and to be able to be discharged home with home health. Diabetes mellitus type 2  Accu-Chek with insulin coverage and monitor blood sugar.     Metformin on hold.     Essential hypertension      Continue losartan     Hyperlipidemia  Continue statin     Chronic GERD  Continue omeprazole     Depression  Continue bupropion and escitalopram    DVT prophylaxis  Aspirin 325 mg twice daily per orthopedic surgery. Care Plan discussed with: Patient/Family, Nursing. Total time spent with patient: 30 minutes. With greater than 50% spent in coordination of care and counseling.     Beatrice Anne MD

## 2021-02-27 NOTE — PROGRESS NOTES
@8015 Received care of pt from off going nurse. Pt lying in bed with eyes closed. @1804 PM Assessment completed. A&OX4. Resp even and non-labored. Lungs clear, SATS 93.% on RA. Skin warm and dry. Trace edema to BLE. Boot to left foot intact. Ace wrap to right leg intact. Legs elevated on pillows. @5525 COVID swab done per MD orders. Pt on modified droplet precautions. @2135 Pt c/o throbbing to right ankle. Pt rated pain @ \"5\". Dilaudid 2 mg po given. @2235 Pt rated pain @ \"4\" on pain scale. @0416 Pt easily arousal at present. INT patent, IVF infusing without any difficulty. Call bell within reach, bed in lowest position. NAD noted. @7068   Patient Vitals for the past 12 hrs:   Temp Pulse Resp BP SpO2   02/27/21 0413 96.9 °F (36.1 °C) 66 15 (!) 97/54 94 %   02/26/21 2352 97.9 °F (36.6 °C) 75 13 (!) 91/48 93 %   02/26/21 1935 97.6 °F (36.4 °C) 74 12 (!) 92/46 93 %     @0700 Verbal shift change report given to katlyn winters (oncoming nurse) by judith luna (offgoing nurse). Report included the following information Kardex, Intake/Output, MAR and Recent Results.

## 2021-02-27 NOTE — PROGRESS NOTES
Problem: Mobility Impaired (Adult and Pediatric)  Goal: *Acute Goals and Plan of Care (Insert Text)  Description: Physical Therapy Goals  Initiated 2/26/2021 and to be accomplished within 7 day(s)  1. Patient will move from supine to sit and sit to supine , scoot up and down, and roll side to side in bed with modified independence. 2.  Patient will transfer from bed to chair and chair to bed with modified independence using the least restrictive device. 3.  Patient will perform sit to stand with modified independence. 4.  Patient will ambulate with contact guard assist for 20 feet with the least restrictive device. 5.  Patient will ascend/descend 3 stairs with 2 handrail(s) with minimal assistance/contact guard assist.    PLOF: Community ambulator who did not use an AD and who was (I) with ADLs. Outcome: Progressing Towards Goal   PHYSICAL THERAPY TREATMENT    Patient: Joaquim Sarabia (73 y.o. female)  Date: 2/27/2021  Diagnosis: Ankle fracture, bimalleolar, closed, right, initial encounter [S82.849M] Ankle fracture, bimalleolar, closed, right, initial encounter  Procedure(s) (LRB):  RIGHT ANKLE OPEN REDUCTION INTERNAL FIXATION (Right) 2 Days Post-Op  Precautions: NWB, WBAT(NWB R LE, WBAT in CAM boot for LLE)    ASSESSMENT:  Pt is progressing well being Mod I for all bed mobility and needing supervision for transfers. Pt was able to maintain NWBing status for R LE, but was fearful of weight bearing through L LE due to L knee pain from hopping yesterday with physical therapy. Completed B quadriceps strengthening in both supine and seated positions. Also worked to improve safety and mechanics with transfers.     Progression toward goals:  [x]      Improving appropriately and progressing toward goals  []      Improving slowly and progressing toward goals  []      Not making progress toward goals and plan of care will be adjusted     PLAN:  Patient continues to benefit from skilled intervention to address the above impairments. Continue treatment per established plan of care. Discharge Recommendations:  SNF or home with [facility]. SUBJECTIVE:   Patient stated my L knee is hurting since hopping yesterday. I would prefer not to do it today.     OBJECTIVE DATA SUMMARY:   Critical Behavior:  Neurologic State: Alert, Eyes open spontaneously  Orientation Level: Oriented X4        Functional Mobility Training:  Bed Mobility:  Rolling: Modified independent  Supine to Sit: Modified independent  Scooting: Stand-by assistance    Transfers:  Sit to Stand: Stand-by assistance  Stand to Sit: Stand-by assistance  Stand Pivot Transfers: Stand-by assistance       Balance:  Sitting: Intact  Standing: Intact  Standing - Static: Good  Standing - Dynamic : Good     Ambulation/Gait Training:  Right Side Weight Bearing: Non-weight bearing  Left Side Weight Bearing: As tolerated    Therapeutic Exercises:         EXERCISE   Sets   Reps   Active Active Assist   Passive Self ROM   Comments   Ankle Pumps 3 5  [] [] [] []    Quad Sets/Glut Sets    [] [] [] [] Hold for 5 secs   Hamstring Sets   [] [] [] []    Short Arc Quads 3 5 [x] [] [] [] B   Heel Slides   [] [] [] []    Straight Leg Raises 3 5 [x] [] [] [] B   Hip Add   [] [] [] [] Hold for 5 secs, w/ pillow squeeze   Long Arc Quads 3 5 [x] [] [] [] B   Seated Marching 2 10 [x] [] [] [] B   Standing Marching   [] [] [] []    Sit-to-stands 2 5 [x] [] [] [] From recliner to wheelchair, S.       Pain:  Pain level pre-treatment: 4/10  Pain level post-treatment: 4/10   Pain Intervention(s): Pt was given pain medication prior to treatment. Activity Tolerance: Moderate  Please refer to the flowsheet for vital signs taken during this treatment.   After treatment:   [x] Patient left in no apparent distress sitting up in chair  [] Patient left in no apparent distress in bed  [x] Call bell left within reach  [x] Nursing notified  [] Caregiver present  [] Bed alarm activated  [] SCDs applied      COMMUNICATION/EDUCATION:   [x]         Role of Physical Therapy in the acute care setting. [x]         Fall prevention education was provided and the patient/caregiver indicated understanding. [x]         Patient/family have participated as able in working toward goals and plan of care. []         Patient/family agree to work toward stated goals and plan of care. []         Patient understands intent and goals of therapy, but is neutral about his/her participation.   []         Patient is unable to participate in stated goals/plan of care: ongoing with therapy staff.  []         Other:        CAS Ray   Time Calculation: 27 mins

## 2021-02-28 VITALS
HEART RATE: 84 BPM | RESPIRATION RATE: 14 BRPM | DIASTOLIC BLOOD PRESSURE: 78 MMHG | BODY MASS INDEX: 33.28 KG/M2 | WEIGHT: 245.7 LBS | TEMPERATURE: 98.1 F | HEIGHT: 72 IN | OXYGEN SATURATION: 95 % | SYSTOLIC BLOOD PRESSURE: 125 MMHG

## 2021-02-28 LAB
GLUCOSE BLD STRIP.AUTO-MCNC: 89 MG/DL (ref 70–110)
GLUCOSE BLD STRIP.AUTO-MCNC: 96 MG/DL (ref 70–110)
PERFORMED BY, TECHID: NORMAL
PERFORMED BY, TECHID: NORMAL
SARS-COV-2, COV2NT: NOT DETECTED

## 2021-02-28 PROCEDURE — 74011250637 HC RX REV CODE- 250/637: Performed by: ORTHOPAEDIC SURGERY

## 2021-02-28 PROCEDURE — 74011250637 HC RX REV CODE- 250/637: Performed by: PHYSICIAN ASSISTANT

## 2021-02-28 PROCEDURE — 74011250637 HC RX REV CODE- 250/637: Performed by: INTERNAL MEDICINE

## 2021-02-28 PROCEDURE — 82962 GLUCOSE BLOOD TEST: CPT

## 2021-02-28 RX ORDER — ASPIRIN 325 MG
325 TABLET, DELAYED RELEASE (ENTERIC COATED) ORAL 2 TIMES DAILY
Qty: 28 TAB | Refills: 0 | Status: SHIPPED | OUTPATIENT
Start: 2021-02-28 | End: 2021-03-14

## 2021-02-28 RX ORDER — OXYCODONE AND ACETAMINOPHEN 5; 325 MG/1; MG/1
1 TABLET ORAL
Qty: 20 TAB | Refills: 0 | Status: SHIPPED | OUTPATIENT
Start: 2021-02-28 | End: 2021-03-14

## 2021-02-28 RX ADMIN — Medication 10 ML: at 05:41

## 2021-02-28 RX ADMIN — BUPROPION HYDROCHLORIDE 300 MG: 150 TABLET, FILM COATED, EXTENDED RELEASE ORAL at 08:27

## 2021-02-28 RX ADMIN — OXYCODONE HYDROCHLORIDE AND ACETAMINOPHEN 500 MG: 500 TABLET ORAL at 08:25

## 2021-02-28 RX ADMIN — PANTOPRAZOLE SODIUM 40 MG: 40 TABLET, DELAYED RELEASE ORAL at 08:26

## 2021-02-28 RX ADMIN — ATORVASTATIN CALCIUM 20 MG: 10 TABLET, FILM COATED ORAL at 08:25

## 2021-02-28 RX ADMIN — ASPIRIN 325 MG: 325 TABLET, COATED ORAL at 08:25

## 2021-02-28 RX ADMIN — LOSARTAN POTASSIUM 50 MG: 25 TABLET, FILM COATED ORAL at 08:25

## 2021-02-28 RX ADMIN — CITALOPRAM HYDROBROMIDE 20 MG: 20 TABLET ORAL at 08:26

## 2021-02-28 NOTE — PROGRESS NOTES
Met with pt and discussed discharge. PT/OT are recom SNF placement however, pt wants to go home with [facility] and see if she is able to take care of herself with the help of friends and her DHAAR. She has crutches, shower chair, and rolling walker at home. She is aware that if she cannot manage at home she is still able to admit herself to a SNF facility. [facility] transport arranged,  Primary nurse Heather and pt made aware of arrangements and pick-up time. No further needs identified.

## 2021-02-28 NOTE — PROGRESS NOTES
Problem: Falls - Risk of  Goal: *Absence of Falls  Description: Document Jacqui Blood Fall Risk and appropriate interventions in the flowsheet. Outcome: Progressing Towards Goal  Note: Fall Risk Interventions:  Mobility Interventions: Patient to call before getting OOB         Medication Interventions: Patient to call before getting OOB    Elimination Interventions: Call light in reach    History of Falls Interventions: Room close to nurse's station         Problem: Pressure Injury - Risk of  Goal: *Prevention of pressure injury  Description: Document Yuvla Scale and appropriate interventions in the flowsheet. Outcome: Progressing Towards Goal  Note: Pressure Injury Interventions: Activity Interventions: PT/OT evaluation    Mobility Interventions: PT/OT evaluation    Nutrition Interventions: Document food/fluid/supplement intake    Friction and Shear Interventions: Feet elevated on foot rest                Problem: Patient Education: Go to Patient Education Activity  Goal: Patient/Family Education  Outcome: Progressing Towards Goal     Problem: Nutrition Deficit  Goal: *Optimize nutritional status  Outcome: Progressing Towards Goal     Problem: Risk for Spread of Infection  Goal: Prevent transmission of infectious organism to others  Description: Prevent the transmission of infectious organisms to other patients, staff members, and visitors.   Outcome: Progressing Towards Goal     Problem: Lower Extremity Fracture:Day of Admission  Goal: Activity/Safety  Outcome: Progressing Towards Goal  Goal: Nutrition/Diet  Outcome: Progressing Towards Goal  Goal: Medications  Outcome: Progressing Towards Goal  Goal: *Optimal pain control at patient's stated goal  Outcome: Progressing Towards Goal  Goal: *Hemodynamically stable  Outcome: Progressing Towards Goal

## 2021-02-28 NOTE — PROGRESS NOTES
@2041 Received care of pt from off going nurse. @6377 PM Assessment completed. A&OX4. Resp even and non-labored. Lungs clear, SATS 93% on RA. Skin warm and dry. Right leg wrapped in ace wrap with splint in place. Left leg has boot on. BLE trace edema. Both BLE elevated on pillows. # 16 fr mg patent, draining yellow colored urine. @2736 Pt c/o \"throbbing pain\" to right ankle. Pt rated pain @ \"6\" on pain scale. Dilaudid 2 mg po given. Pt assisted with repositioning in bed. @2223 Pt rated pain @\"3\" on pain scale. Pt stated pain as eased off.     @0402 Pt easily arousal.  Pt denies any complaints at present. SATS 95% on RA. Call bell within reach, bed in lowest position. NAD noted. @3082 Verbal shift change report given to katlyn winters (oncoming nurse) by Sophie Ramirez, RN (offgoing nurse). Report included the following information Kardex, Intake/Output, MAR and Recent Results. Verbal shift change report given to katlyn Campos (oncoming nurse) by Sophie Ramirez (offgoing nurse). Report included the following information Kardex, Intake/Output, MAR and Recent Results.

## 2021-02-28 NOTE — PROGRESS NOTES
Problem: Falls - Risk of  Goal: *Absence of Falls  Description: Document Renteria Reason Fall Risk and appropriate interventions in the flowsheet. Outcome: Resolved/Met     Problem: Patient Education: Go to Patient Education Activity  Goal: Patient/Family Education  Outcome: Resolved/Met     Problem: Pressure Injury - Risk of  Goal: *Prevention of pressure injury  Description: Document Yuval Scale and appropriate interventions in the flowsheet. Outcome: Resolved/Met     Problem: Patient Education: Go to Patient Education Activity  Goal: Patient/Family Education  Outcome: Resolved/Met     Problem: Patient Education: Go to Patient Education Activity  Goal: Patient/Family Education  Outcome: Resolved/Met     Problem: Nutrition Deficit  Goal: *Optimize nutritional status  Outcome: Resolved/Met     Problem: Risk for Spread of Infection  Goal: Prevent transmission of infectious organism to others  Description: Prevent the transmission of infectious organisms to other patients, staff members, and visitors.   Outcome: Resolved/Met     Problem: Patient Education:  Go to Education Activity  Goal: Patient/Family Education  Outcome: Resolved/Met     Problem: Patient Education: Go to Patient Education Activity  Goal: Patient/Family Education  Outcome: Resolved/Met     Problem: Lower Extremity Fracture:Day of Admission  Goal: Off Pathway (Use only if patient is Off Pathway)  Outcome: Resolved/Met  Goal: Activity/Safety  Outcome: Resolved/Met  Goal: Consults, if ordered  Outcome: Resolved/Met  Goal: Diagnostic Test/Procedures  Outcome: Resolved/Met  Goal: Nutrition/Diet  Outcome: Resolved/Met  Goal: Medications  Outcome: Resolved/Met  Goal: Respiratory  Outcome: Resolved/Met  Goal: Treatments/Interventions/Procedures  Outcome: Resolved/Met  Goal: Psychosocial  Outcome: Resolved/Met  Goal: *Optimal pain control at patient's stated goal  Outcome: Resolved/Met  Goal: *Hemodynamically stable  Outcome: Resolved/Met  Goal: *Adequate oxygenation  Outcome: Resolved/Met     Problem: Lower Extremity Fracture:Day of Surgery (Intiate SCIP Measures for Post-op Care)  Goal: Off Pathway (Use only if patient is Off Pathway)  Outcome: Resolved/Met  Goal: Activity/Safety  Outcome: Resolved/Met  Goal: Consults, if ordered  Outcome: Resolved/Met  Goal: Diagnostic Test/Procedures  Outcome: Resolved/Met  Goal: Nutrition/Diet  Outcome: Resolved/Met  Goal: Medications  Outcome: Resolved/Met  Goal: Respiratory  Outcome: Resolved/Met  Goal: Treatments/Interventions/Procedures  Outcome: Resolved/Met  Goal: Psychosocial  Outcome: Resolved/Met  Goal: *Optimal pain control at patient's stated goal  Outcome: Resolved/Met  Goal: *Hemodynamically stable  Outcome: Resolved/Met  Goal: *Adequate oxygenation  Outcome: Resolved/Met     Problem: Lower Extremity Fracture:Post-op Day 1  Goal: Off Pathway (Use only if patient is Off Pathway)  Outcome: Resolved/Met  Goal: Activity/Safety  Outcome: Resolved/Met  Goal: Consults, if ordered  Outcome: Resolved/Met  Goal: Diagnostic Test/Procedures  Outcome: Resolved/Met  Goal: Nutrition/Diet  Outcome: Resolved/Met  Goal: Discharge Planning  Outcome: Resolved/Met  Goal: Medications  Outcome: Resolved/Met  Goal: Respiratory  Outcome: Resolved/Met  Goal: Treatments/Interventions/Procedures  Outcome: Resolved/Met  Goal: Psychosocial  Outcome: Resolved/Met  Goal: *Optimal pain control at patient's stated goal  Outcome: Resolved/Met  Goal: *Hemodynamically stable  Outcome: Resolved/Met  Goal: *Adequate oxygenation  Outcome: Resolved/Met  Goal: *PT/INR within defined limits  Outcome: Resolved/Met  Goal: *Demonstrates progressive activity  Outcome: Resolved/Met     Problem: Lower Extremity Fracture:Post-op Day 2  Goal: Off Pathway (Use only if patient is Off Pathway)  Outcome: Resolved/Met  Goal: Activity/Safety  Outcome: Resolved/Met  Goal: Diagnostic Test/Procedures  Outcome: Resolved/Met  Goal: Nutrition/Diet  Outcome: Resolved/Met  Goal: Discharge Planning  Outcome: Resolved/Met  Goal: Medications  Outcome: Resolved/Met  Goal: Respiratory  Outcome: Resolved/Met  Goal: Treatments/Interventions/Procedures  Outcome: Resolved/Met  Goal: Psychosocial  Outcome: Resolved/Met  Goal: *Optimal pain control at patient's stated goal  Outcome: Resolved/Met  Goal: *Hemodynamically stable  Outcome: Resolved/Met  Goal: *Adequate oxygenation  Outcome: Resolved/Met  Goal: *PT/INR within defined limits  Outcome: Resolved/Met  Goal: *Demonstrates progressive activity  Outcome: Resolved/Met  Goal: *Voiding  Outcome: Resolved/Met  Goal: *Bowel movement  Outcome: Resolved/Met  Goal: *Tolerating diet  Outcome: Resolved/Met     Problem: Lower Extremity Fracture:Post-op Day 3  Goal: Off Pathway (Use only if patient is Off Pathway)  Outcome: Resolved/Met  Goal: Activity/Safety  Outcome: Resolved/Met  Goal: Diagnostic Test/Procedures  Outcome: Resolved/Met  Goal: Nutrition/Diet  Outcome: Resolved/Met  Goal: Discharge Planning  Outcome: Resolved/Met  Goal: Medications  Outcome: Resolved/Met  Goal: Respiratory  Outcome: Resolved/Met  Goal: Treatments/Interventions/Procedures  Outcome: Resolved/Met  Goal: Psychosocial  Outcome: Resolved/Met  Goal: *Optimal pain control at patient's stated goal  Outcome: Resolved/Met  Goal: *Hemodynamically stable  Outcome: Resolved/Met  Goal: *Adequate oxygenation  Outcome: Resolved/Met  Goal: *PT/INR within defined limits  Outcome: Resolved/Met  Goal: *Demonstrates progressive activity  Outcome: Resolved/Met  Goal: *Voiding  Outcome: Resolved/Met  Goal: *Bowel movement  Outcome: Resolved/Met  Goal: *Tolerating diet  Outcome: Resolved/Met     Problem: Lower Extremity Fracture:Post-op Day 4  Goal: Off Pathway (Use only if patient is Off Pathway)  Outcome: Resolved/Met  Goal: Activity/Safety  Outcome: Resolved/Met  Goal: Diagnostic Test/Procedures  Outcome: Resolved/Met  Goal: Nutrition/Diet  Outcome: Resolved/Met  Goal: Discharge Planning  Outcome: Resolved/Met  Goal: Medications  Outcome: Resolved/Met  Goal: Respiratory  Outcome: Resolved/Met  Goal: Treatments/Interventions/Procedures  Outcome: Resolved/Met  Goal: Psychosocial  Outcome: Resolved/Met     Problem: Lower Extremity Fracture:Discharge Outcomes  Goal: *Hemodynamically stable  Outcome: Resolved/Met  Goal: *Modified independence with transfers, ambulation on levels with assistance devices, stair climbing, ADL's  Outcome: Resolved/Met  Goal: *Independent with active exercises  Outcome: Resolved/Met  Goal: *Describes follow-up/return visits to physicians  Outcome: Resolved/Met  Goal: *Tolerating diet  Outcome: Resolved/Met  Goal: *Optimal pain control at patient's stated goal  Outcome: Resolved/Met  Goal: *Adequate air exchange  Outcome: Resolved/Met  Goal: *Lungs clear or at baseline  Outcome: Resolved/Met  Goal: *Afebrile  Outcome: Resolved/Met  Goal: *Incision intact without signs of infection, redness, warmth  Outcome: Resolved/Met  Goal: *Absence of deep venous thrombosis signs and symptoms(Stroke Metric)  Outcome: Resolved/Met  Goal: *Active bowel function  Outcome: Resolved/Met  Goal: *Adequate urinary output  Outcome: Resolved/Met  Goal: *Discharge anxiety minimal  Outcome: Resolved/Met  Goal: *Describes available resources and support systems  Outcome: Resolved/Met

## 2021-02-28 NOTE — ROUTINE PROCESS
Assumed care of pt. Pt resting comfortably in bed with eyes closed. Pt shows no signs of distress and has no c/o pain or needs at this time. CBWR, 2 side rails up and bed locked in lowest position. [address] and left msg with PHILOMENA Velez concerning pt discharge with home health. 1249 Discharge instructions given to pt. Pt acknowledged understanding. Pt waiting for [facility] to arrive for transport home.

## 2021-02-28 NOTE — ROUTINE PROCESS
Elma Fregoso enters to  pt. Pt transferred to stretcher. Elma Fregoso exits enroute to pt residence.

## 2021-02-28 NOTE — PROGRESS NOTES
Problem: Falls - Risk of  Goal: *Absence of Falls  Description: Document Vito Huertas Fall Risk and appropriate interventions in the flowsheet. Outcome: Resolved/Met     Problem: Patient Education: Go to Patient Education Activity  Goal: Patient/Family Education  Outcome: Resolved/Met     Problem: Pressure Injury - Risk of  Goal: *Prevention of pressure injury  Description: Document Yuval Scale and appropriate interventions in the flowsheet. Outcome: Resolved/Met     Problem: Patient Education: Go to Patient Education Activity  Goal: Patient/Family Education  Outcome: Resolved/Met     Problem: Patient Education: Go to Patient Education Activity  Goal: Patient/Family Education  Outcome: Resolved/Met     Problem: Nutrition Deficit  Goal: *Optimize nutritional status  Outcome: Resolved/Met     Problem: Risk for Spread of Infection  Goal: Prevent transmission of infectious organism to others  Description: Prevent the transmission of infectious organisms to other patients, staff members, and visitors.   Outcome: Resolved/Met     Problem: Patient Education:  Go to Education Activity  Goal: Patient/Family Education  Outcome: Resolved/Met     Problem: Patient Education: Go to Patient Education Activity  Goal: Patient/Family Education  Outcome: Resolved/Met     Problem: Lower Extremity Fracture:Day of Admission  Goal: Off Pathway (Use only if patient is Off Pathway)  Outcome: Resolved/Met  Goal: Activity/Safety  Outcome: Resolved/Met  Goal: Consults, if ordered  Outcome: Resolved/Met  Goal: Diagnostic Test/Procedures  Outcome: Resolved/Met  Goal: Nutrition/Diet  Outcome: Resolved/Met  Goal: Medications  Outcome: Resolved/Met  Goal: Respiratory  Outcome: Resolved/Met  Goal: Treatments/Interventions/Procedures  Outcome: Resolved/Met  Goal: Psychosocial  Outcome: Resolved/Met  Goal: *Optimal pain control at patient's stated goal  Outcome: Resolved/Met  Goal: *Hemodynamically stable  Outcome: Resolved/Met  Goal: *Adequate oxygenation  Outcome: Resolved/Met     Problem: Lower Extremity Fracture:Day of Surgery (Intiate SCIP Measures for Post-op Care)  Goal: Off Pathway (Use only if patient is Off Pathway)  Outcome: Resolved/Met  Goal: Activity/Safety  Outcome: Resolved/Met  Goal: Consults, if ordered  Outcome: Resolved/Met  Goal: Diagnostic Test/Procedures  Outcome: Resolved/Met  Goal: Nutrition/Diet  Outcome: Resolved/Met  Goal: Medications  Outcome: Resolved/Met  Goal: Respiratory  Outcome: Resolved/Met  Goal: Treatments/Interventions/Procedures  Outcome: Resolved/Met  Goal: Psychosocial  Outcome: Resolved/Met  Goal: *Optimal pain control at patient's stated goal  Outcome: Resolved/Met  Goal: *Hemodynamically stable  Outcome: Resolved/Met  Goal: *Adequate oxygenation  Outcome: Resolved/Met     Problem: Lower Extremity Fracture:Post-op Day 1  Goal: Off Pathway (Use only if patient is Off Pathway)  Outcome: Resolved/Met  Goal: Activity/Safety  Outcome: Resolved/Met  Goal: Consults, if ordered  Outcome: Resolved/Met  Goal: Diagnostic Test/Procedures  Outcome: Resolved/Met  Goal: Nutrition/Diet  Outcome: Resolved/Met  Goal: Discharge Planning  Outcome: Resolved/Met  Goal: Medications  Outcome: Resolved/Met  Goal: Respiratory  Outcome: Resolved/Met  Goal: Treatments/Interventions/Procedures  Outcome: Resolved/Met  Goal: Psychosocial  Outcome: Resolved/Met  Goal: *Optimal pain control at patient's stated goal  Outcome: Resolved/Met  Goal: *Hemodynamically stable  Outcome: Resolved/Met  Goal: *Adequate oxygenation  Outcome: Resolved/Met  Goal: *PT/INR within defined limits  Outcome: Resolved/Met  Goal: *Demonstrates progressive activity  Outcome: Resolved/Met     Problem: Lower Extremity Fracture:Post-op Day 2  Goal: Off Pathway (Use only if patient is Off Pathway)  Outcome: Resolved/Met  Goal: Activity/Safety  Outcome: Resolved/Met  Goal: Diagnostic Test/Procedures  Outcome: Resolved/Met  Goal: Nutrition/Diet  Outcome: Resolved/Met  Goal: Discharge Planning  Outcome: Resolved/Met  Goal: Medications  Outcome: Resolved/Met  Goal: Respiratory  Outcome: Resolved/Met  Goal: Treatments/Interventions/Procedures  Outcome: Resolved/Met  Goal: Psychosocial  Outcome: Resolved/Met  Goal: *Optimal pain control at patient's stated goal  Outcome: Resolved/Met  Goal: *Hemodynamically stable  Outcome: Resolved/Met  Goal: *Adequate oxygenation  Outcome: Resolved/Met  Goal: *PT/INR within defined limits  Outcome: Resolved/Met  Goal: *Demonstrates progressive activity  Outcome: Resolved/Met  Goal: *Voiding  Outcome: Resolved/Met  Goal: *Bowel movement  Outcome: Resolved/Met  Goal: *Tolerating diet  Outcome: Resolved/Met     Problem: Lower Extremity Fracture:Post-op Day 3  Goal: Off Pathway (Use only if patient is Off Pathway)  Outcome: Resolved/Met  Goal: Activity/Safety  Outcome: Resolved/Met  Goal: Diagnostic Test/Procedures  Outcome: Resolved/Met  Goal: Nutrition/Diet  Outcome: Resolved/Met  Goal: Discharge Planning  Outcome: Resolved/Met  Goal: Medications  Outcome: Resolved/Met  Goal: Respiratory  Outcome: Resolved/Met  Goal: Treatments/Interventions/Procedures  Outcome: Resolved/Met  Goal: Psychosocial  Outcome: Resolved/Met  Goal: *Optimal pain control at patient's stated goal  Outcome: Resolved/Met  Goal: *Hemodynamically stable  Outcome: Resolved/Met  Goal: *Adequate oxygenation  Outcome: Resolved/Met  Goal: *PT/INR within defined limits  Outcome: Resolved/Met  Goal: *Demonstrates progressive activity  Outcome: Resolved/Met  Goal: *Voiding  Outcome: Resolved/Met  Goal: *Bowel movement  Outcome: Resolved/Met  Goal: *Tolerating diet  Outcome: Resolved/Met     Problem: Lower Extremity Fracture:Post-op Day 4  Goal: Off Pathway (Use only if patient is Off Pathway)  Outcome: Resolved/Met  Goal: Activity/Safety  Outcome: Resolved/Met  Goal: Diagnostic Test/Procedures  Outcome: Resolved/Met  Goal: Nutrition/Diet  Outcome: Resolved/Met  Goal: Discharge Planning  Outcome: Resolved/Met  Goal: Medications  Outcome: Resolved/Met  Goal: Respiratory  Outcome: Resolved/Met  Goal: Treatments/Interventions/Procedures  Outcome: Resolved/Met  Goal: Psychosocial  Outcome: Resolved/Met     Problem: Lower Extremity Fracture:Discharge Outcomes  Goal: *Hemodynamically stable  Outcome: Resolved/Met  Goal: *Modified independence with transfers, ambulation on levels with assistance devices, stair climbing, ADL's  Outcome: Resolved/Met  Goal: *Independent with active exercises  Outcome: Resolved/Met  Goal: *Describes follow-up/return visits to physicians  Outcome: Resolved/Met  Goal: *Tolerating diet  Outcome: Resolved/Met  Goal: *Optimal pain control at patient's stated goal  Outcome: Resolved/Met  Goal: *Adequate air exchange  Outcome: Resolved/Met  Goal: *Lungs clear or at baseline  Outcome: Resolved/Met  Goal: *Afebrile  Outcome: Resolved/Met  Goal: *Incision intact without signs of infection, redness, warmth  Outcome: Resolved/Met  Goal: *Absence of deep venous thrombosis signs and symptoms(Stroke Metric)  Outcome: Resolved/Met  Goal: *Active bowel function  Outcome: Resolved/Met  Goal: *Adequate urinary output  Outcome: Resolved/Met  Goal: *Discharge anxiety minimal  Outcome: Resolved/Met  Goal: *Describes available resources and support systems  Outcome: Resolved/Met

## 2021-02-28 NOTE — DISCHARGE INSTRUCTIONS
Patient Education        Broken Ankle: Care Instructions  Your Care Instructions     An ankle may break (fracture) during sports, a fall, or other accidents. Fractures can range from a small, hairline crack, to a bone or bones broken into two or more pieces. Your treatment depends on how bad the break is. Your doctor may have put your ankle in a splint or cast to allow it to heal or to keep it stable until you see another doctor. It may take weeks or months for your ankle to heal. You can help your ankle heal with some care at home. You heal best when you take good care of yourself. Eat a variety of healthy foods, and don't smoke. You may have had a sedative to help you relax. You may be unsteady after having sedation. It can take a few hours for the medicine's effects to wear off. Common side effects of sedation include nausea, vomiting, and feeling sleepy or tired. The doctor has checked you carefully, but problems can develop later. If you notice any problems or new symptoms,  get medical treatment right away. Follow-up care is a key part of your treatment and safety. Be sure to make and go to all appointments, and call your doctor if you are having problems. It's also a good idea to know your test results and keep a list of the medicines you take. How can you care for yourself at home? · If the doctor gave you a sedative:  ? For 24 hours, don't do anything that requires attention to detail, such as going to work, making important decisions, or signing any legal documents. It takes time for the medicine's effects to completely wear off.  ? For your safety, do not drive or operate any machinery that could be dangerous. Wait until the medicine wears off and you can think clearly and react easily. · Put ice or a cold pack on your ankle for 10 to 20 minutes at a time. Try to do this every 1 to 2 hours for the next 3 days (when you are awake). Put a thin cloth between the ice and your cast or splint.  Keep your cast or splint dry. · Follow the cast care instructions your doctor gives you. If you have a splint, do not take it off unless your doctor tells you to. · Be safe with medicines. Take pain medicines exactly as directed. ? If the doctor gave you a prescription medicine for pain, take it as prescribed. ? If you are not taking a prescription pain medicine, ask your doctor if you can take an over-the-counter medicine. · Prop up your leg on pillows in the first few days after the injury. Keep the ankle higher than the level of your heart. This will help reduce swelling. · Do not put weight on your ankle unless your doctor tells you to. Use crutches to walk. · Follow instructions for exercises to keep your leg strong. · Wiggle your toes often to reduce swelling and stiffness. When should you call for help? Call 911 anytime you think you may need emergency care. For example, call if:    · You have chest pain, are short of breath, or you cough up blood.     · You are very sleepy and you have trouble waking up. Call your doctor now or seek immediate medical care if:    · You have new or worse nausea or vomiting.     · You have new or worse pain.     · Your foot is cool or pale or changes color.     · You have tingling, weakness, or numbness in your toes.     · Your cast or splint feels too tight.     · You have signs of a blood clot in your leg (called a deep vein thrombosis), such as:  ? Pain in your calf, back of the knee, thigh, or groin. ? Redness or swelling in your leg. Watch closely for changes in your health, and be sure to contact your doctor if:    · You have a problem with your splint or cast.     · You do not get better as expected. Where can you learn more? Go to http://www.gray.com/  Enter P763 in the search box to learn more about \"Broken Ankle: Care Instructions. \"  Current as of: March 2, 2020               Content Version: 12.6  © 8587-2360 [employer]. Care instructions adapted under license by [employer] (which disclaims liability or warranty for this information). If you have questions about a medical condition or this instruction, always ask your healthcare professional. [address] any warranty or liability for your use of this information. Patient Education        Ankle Fracture: Rehab Exercises  Introduction  Here are some examples of exercises for you to try. The exercises may be suggested for a condition or for rehabilitation. Start each exercise slowly. Ease off the exercises if you start to have pain. You will be told when to start these exercises and which ones will work best for you. How to do the exercises  Calf stretch (knee straight)   For this exercise, you will need a towel. 1. Sit with your affected leg straight and supported on the floor. Your other leg should be bent, with that foot flat on the floor. 2. Place a towel around your affected foot just under the toes. 3. Hold one end of the towel in each hand, with your hands above your knees. 4. Pull back gently with the towel so that your foot stretches toward you. 5. Hold the position for at least 15 to 30 seconds. 6. Repeat 2 to 4 times a session, up to 5 sessions a day. Calf stretch (knee bent)   For this exercise, you will need a towel. You will also need a pillow or foam roll. 1. Sit with your affected leg straight and supported on the floor. Your other leg should be bent, with that foot flat on the floor. 2. Place a pillow or foam roll under your affected leg. 3. Place a towel around your affected foot just under the toes. 4. Hold one end of the towel in each hand, with your hands above your knees. 5. Pull back gently with the towel so that your foot stretches toward you. 6. Hold the position for at least 15 to 30 seconds. 7. Repeat 2 to 4 times a session, up to 5 sessions a day. Ankle plantar flexion   1.  Sit with your affected leg straight and supported on the floor. Your other leg should be bent, with that foot flat on the floor. 2. Keeping your affected leg straight, gently flex your foot downward so your toes are pointed away from your body. Then slowly relax your foot to the starting position. 3. Repeat 8 to 12 times. Ankle dorsiflexion   1. Sit with your affected leg straight and supported on the floor. Your other leg should be bent, with that foot flat on the floor. 2. Keeping your affected leg straight, gently flex your foot back toward your body so your toes point upward. Then slowly relax your foot to the starting position. 3. Repeat 8 to 12 times. Resisted ankle plantar flexion   For the next four exercises, you will need elastic exercise material, such as surgical tubing or Thera-Band. 1. Sit with your affected leg straight and supported on the floor. Your other leg should be bent, with that foot flat on the floor. 2. Place an elastic band around your affected foot just under the toes. 3. Hold each end of the band in each hand, with your hands above your knees. 4. Keeping your affected leg straight, gently flex your foot downward so your toes are pointed away from your body. Then slowly relax your foot to the starting position. 5. Repeat 8 to 12 times. Resisted ankle dorsiflexion   1. Tie the ends of an exercise band together to form a loop. Attach one end of the loop to a secure object, like a table leg, or shut a door on it to hold it in place. (Or you can have someone hold one end of the loop to provide resistance.)  2. While sitting on the floor or in a chair, loop the other end of the band over the top of your affected foot. 3. Keeping your knee and leg straight, slowly flex your foot toward you to pull back on the exercise band, and then slowly relax. 4. Repeat 8 to 12 times. Resisted ankle inversion   1. Sit on the floor with your good leg crossed over your other leg.   2. Hold both ends of an exercise band and loop the band around the inside of your affected foot. Then press your good foot against the band. 3. Keeping your legs crossed, slowly push your affected foot against the band so that foot moves away from your good foot. Then slowly relax. 4. Repeat 8 to 12 times. Resisted ankle eversion   1. Sit on the floor with your legs straight. 2. Hold both ends of an exercise band and loop the band around the outside of your affected foot. Then press your good foot against the band. 3. Keeping your leg straight, slowly push your affected foot outward against the band and away from your good foot without letting your leg rotate. Then slowly relax. 4. Repeat 8 to 12 times. Ankle alphabet   1. Sit in a chair with your feet flat on the floor. (You can also do this exercise lying on your back with your affected leg propped up on a pillow). 2. Lift the heel of your affected foot off the floor, and slowly trace the letters of the alphabet. Heel raises   1. Stand with your feet a few inches apart, with your hands lightly resting on a counter or chair in front of you. 2. Slowly raise your heels off the floor while keeping your knees straight. 3. Hold for about 6 seconds, then slowly lower your heels to the floor. 4. Do 8 to 12 repetitions several times during the day. Follow-up care is a key part of your treatment and safety. Be sure to make and go to all appointments, and call your doctor if you are having problems. It's also a good idea to know your test results and keep a list of the medicines you take. Where can you learn more? Go to http://www.gray.com/  Enter T800 in the search box to learn more about \"Ankle Fracture: Rehab Exercises. \"  Current as of: March 2, 2020               Content Version: 12.6  © 4916-3955 [employer].    Care instructions adapted under license by [employer] (which disclaims liability or warranty for this information). If you have questions about a medical condition or this instruction, always ask your healthcare professional. [address] any warranty or liability for your use of this information. Lower Extremity Post-op Instructions: Your first meal home should be clear liquids    If you are given antibiotics, start antibiotics evening of the surgery unless otherwise instructed. Start Pain meds, the night you have surgery if you need is. No alcohol while on pain meds postop. An Ice bag should be applied to your operative site for at least 20 minutes four times a day. DO NOT USE HEAT-this may cause increased swelling and discomfort. You may move your toes as tolerated. You may bear weight as tolerated unless physical therapy has instructed you otherwise with the weightbearing status. Dressing should remain in place for 5 days. If you have a brace on or a splint on than those dressings and splint needs to remain in place until the follow-up appointment. No driving if you have a splint or a brace on. Swelling and discoloration may be present post-operatively. This is normal.    If your job requires little physical activity you may return as you feel able. If your job requires excessive lifting or use of affected extremity, please discuss return to work date with your nurse or physician. If you are given a virtual appointment please make sure you have a smart phone with the camera. If you need refill of pain medication, call the office 2 business days prior to running out of medication. Please call during regular business hours, Medication refill requests will not be addressed during non-business hours. Please do not page the on-call provider for pain medication refills after hours.               Things to watch for:             Increased swelling of the surgical site             Spreading of redness around the incision site             Drainage of pus from the incision site             Developing a fever of 101.5 °F or higher that does not respond to Tylenol               If any of these symptoms occur you have any questions please contact our office at [phone]. If you need to talk to Dr. Betzaida Isaacs on an urgent basis please call the hospital at [phone]. 0 for the . Please let the  know you are a surgical patient of Dr. Betzaida Isaacs and you wish to get in contact with him. If Dr. Betzaida Isaacs or his staff do not call you back within 30 minutes. Please tell the  to try again.

## 2021-02-28 NOTE — DISCHARGE SUMMARY
HOSPITALIST DISCHARGE NOTE  Vipin Green MD, [address]       PATIENT ID: Dara Diaz  MRN: [id number]   YOB: 1960    DATE OF ADMISSION: 2/25/2021  1:45 PM    DATE OF DISCHARGE: 02/28/21    PRIMARY CARE PROVIDER: None     ATTENDING PHYSICIAN: Vipin Green MD  DISCHARGING PROVIDER: Vipin Green MD        CONSULTATIONS: None    PROCEDURES/SURGERIES: Procedure(s) with comments:  RIGHT ANKLE OPEN REDUCTION INTERNAL FIXATION - LMA with a block    ADMITTING [address] [place] [address] COURSE:   Dara Diaz is a 64 y.o. female with past medical history of diabetes type 2 hypertension mixed hyperlipidemia depression presented to the ER after a fall complaint of right foot pain. Patient claimed that she tripped and fall landing on her right leg. She denies any head trauma or any LOC. she was brought to the hospital and x-ray show multiple fractures including fracture right ankle left tibial and dorsal talus fracture. She was taken to the OR immediately by orthopedic before we were able to see this patient in the ER. Now she is post surgery and doing well no shortness of breath no chest pain no headache dizziness no nausea vomiting observe for minimal pain lower extremities. She is being admitted in ICU postop. Her pain is very minimal on the right lower extremity. She is being  admitted for several lower extremity fractures  to ICU for overnight observation post surgery. DISCHARGE DIAGNOSES / PLAN:      Ankle fracture, bimalleolar  Left ankle with nondisplaced distal fibular fracture status post cam boot and weightbearing as tolerated. Right ankle with displaced trimalleolar fracture status post ORIF per orthopedic surgery and is nonweightbearing.   Minimally tolerating physical therapy and requires an additional further work with physical therapy for the next 24 to 48 hours, prior to being discharged home. Patient lives alone however now states that she will be able to manage with home health and physical therapy coming at home as she has plenty of social support and caregivers at home. Continue aspirin 325 mg twice daily for DVT prophylaxis per orthopedic surgery. Given nonweightbearing status in the right lower extremity in Cam boot in the left lower extremity patient will require Rollator walker for assistance and to be able to be discharged home with home health.     Diabetes mellitus type 2  Accu-Chek with insulin coverage and monitor blood sugar.    Restart Metformin upon discharge home.     Essential hypertension      Continue losartan     Hyperlipidemia  Continue statin     Chronic GERD  Continue omeprazole     Depression  Continue bupropion and escitalopram       PENDING TEST RESULTS:   At the time of discharge the following test results are still pending: None    FOLLOW UP APPOINTMENTS:    Follow-up Information     Follow up With Specialties Details Why Contact Info    [facility] Orthopedic Surgery In 2 weeks  [address] [place], [address] [place] [address] [place] [address] [phone]    None    None (395) Patient stated that they have no PCP             DIET: Diabetic diet    ACTIVITY: Activity as tolerated and No driving for 3 weeks    EQUIPMENT needed: Rollator walker      DISCHARGE MEDICATIONS:  Current Discharge Medication List      START taking these medications    Details   oxyCODONE-acetaminophen (Percocet) 5-325 mg per tablet Take 1 Tab by mouth every six (6) hours as needed for Pain for up to 14 days. Max Daily Amount: 4 Tabs. Qty: 20 Tab, Refills: 0    Associated Diagnoses: Right ankle injury, initial encounter      aspirin delayed-release 325 mg tablet Take 1 Tab by mouth two (2) times a day for 14 days.   Qty: 28 Tab, Refills: 0         CONTINUE these medications which have NOT CHANGED    Details   loratadine 10 mg cap Take 10 mg by mouth.      losartan (COZAAR) 50 mg tablet Take 50 mg by mouth daily. hydroCHLOROthiazide (MICROZIDE) 12.5 mg capsule Take 12.5 mg by mouth daily. Omeprazole delayed release (PRILOSEC D/R) 20 mg tablet Take 20 mg by mouth daily. multivitamin (ONE A DAY) tablet Take 1 Tab by mouth daily. calcium-cholecalciferol, d3, (CALCIUM 600 + D) 600-125 mg-unit tab Take 600 mg by mouth. ascorbic acid, vitamin C, (Vitamin C) 500 mg tablet Take 500 mg by mouth daily. metFORMIN (GLUCOPHAGE) 500 mg tablet Take 500 mg by mouth daily (with breakfast). atorvastatin (LIPITOR) 20 mg tablet Take 20 mg by mouth daily. gabapentin (NEURONTIN) 100 mg capsule Take 200 mg by mouth daily. buPROPion XL (WELLBUTRIN XL) 300 mg XL tablet Take 300 mg by mouth every morning. escitalopram oxalate (LEXAPRO) 20 mg tablet Take 20 mg by mouth daily. STOP taking these medications       cephALEXin (KEFLEX) 500 mg capsule Comments:   Reason for Stopping:                 Recent Days:  Recent Labs     02/26/21  0600 02/25/21  1435   WBC 9.9 9.7   HGB 10.9* 12.3   HCT 34.1* 37.3    281     Recent Labs     02/26/21  0600 02/25/21  1435    137   K 3.9 3.5    97   CO2 24 26   * 90   BUN 19 16   CREA 1.20 1.20   CA 8.2* 9.7   INR  --  0.9     No results for input(s): PH, PCO2, PO2, HCO3, FIO2 in the last 72 hours.     24 Hour Results:  Recent Results (from the past 24 hour(s))   GLUCOSE, POC    Collection Time: 02/27/21 11:39 AM   Result Value Ref Range    Glucose (POC) 93 70 - 110 mg/dL    Performed by Wendy Alexandre, POC    Collection Time: 02/27/21  5:21 PM   Result Value Ref Range    Glucose (POC) 96 70 - 110 mg/dL    Performed by [address], POC    Collection Time: 02/27/21  9:18 PM   Result Value Ref Range    Glucose (POC) 107 70 - 110 mg/dL    Performed by [address], POC    Collection Time: 02/28/21  7:55 AM   Result Value Ref Range Glucose (POC) 96 70 - 110 mg/dL    Performed by Peg Peak        NOTIFY YOUR PHYSICIAN FOR ANY OF THE FOLLOWING:   Fever over 101 degrees for 24 hours. Chest pain, shortness of breath, fever, chills, nausea, vomiting, diarrhea, change in mentation, falling, weakness, bleeding. Severe pain or pain not relieved by medications. Or, any other signs or symptoms that you may have questions about. DISPOSITION:   x Home With:   OT  PT xx HH  RN       Long term SNF/Inpatient Rehab    Independent/assisted living    Hospice    Other:       PATIENT CONDITION AT DISCHARGE:     Functional status    Poor     Deconditioned    x Independent      Cognition    x Lucid     Forgetful     Dementia      Catheters/lines (plus indication)    Roper     PICC     PEG    x None      Code status    x Full code     DNR      PHYSICAL EXAMINATION AT DISCHARGE:  General:          Alert, cooperative, no distress, appears stated age. Neck:               Supple, symmetrical  Lungs:             Clear to auscultation bilaterally. No Wheezing or Rhonchi. No rales. Chest wall:      No tenderness  No Accessory muscle use. Heart:              Regular  rhythm,  No  murmur   No edema  Abdomen:        Soft, non-tender. Not distended. Bowel sounds normal  Extremities:     Left lower leg in cam boot, right lower extremity    bandaged . no cyanosis or edema. Skin turgor normal, Capillary refill normal  Skin:                Not pale. Not Jaundiced  No rashes   Psych:             Not anxious or agitated.   Neurologic:      Alert, moves all extremities, answers questions appropriately and responds to commands       CHRONIC MEDICAL DIAGNOSES:  Problem List as of 2/28/2021 Never Reviewed          Codes Class Noted - Resolved    * (Principal) Ankle fracture, bimalleolar, closed, right, initial encounter ICD-10-CM: N70.424T  ICD-9-CM: 824.4  2/25/2021 - Present    Overview Signed 2/25/2021 10:22 PM by Azael Finney PA-C Status post open reduction. Management per orthopedic             Left tibial fracture ICD-10-CM: S82.202A  ICD-9-CM: 823.80  2/25/2021 - Present    Overview Signed 2/25/2021 10:25 PM by Yadira Garcia PA-C     Soft splint per orthopedic. Management per orthopedic             Talus fracture ICD-10-CM: S92.109A  ICD-9-CM: 825.21  2/25/2021 - Present    Overview Signed 2/25/2021 10:26 PM by Yadira Garcia PA-C     Management per orthopedic             Diabetes mellitus type 2, controlled ([facility] ICD-10-CM: E11.9  ICD-9-CM: 250.00  2/25/2021 - Present    Overview Signed 2/25/2021 10:28 PM by Yadira Garcia PA-C     Accu-Chek with insulin coverage and monitor blood sugar. Continue Metformin             Essential hypertension ICD-10-CM: I10  ICD-9-CM: 401.9  2/25/2021 - Present    Overview Signed 2/25/2021 10:29 PM by Yadira Garcia PA-C     BP stable continue losartan             Hyperlipidemia, mixed ICD-10-CM: E78.2  ICD-9-CM: 272.2  2/25/2021 - Present    Overview Signed 2/25/2021 10:29 PM by Yadira Garcia PA-C     Continue statin             Chronic GERD ICD-10-CM: K21.9  ICD-9-CM: 530.81  2/25/2021 - Present    Overview Signed 2/25/2021 10:30 PM by Yadira Garcia PA-C     Continue omeprazole             Depression, major ICD-10-CM: F32.9  ICD-9-CM: 296.20  2/25/2021 - Present    Overview Signed 2/25/2021 10:30 PM by Yadira Garcia PA-C     Continue bupropion and escitalopram             Acute right ankle pain ICD-10-CM: M25.571  ICD-9-CM: 719.47, 338.19  2/25/2021 - Present    Overview Signed 2/25/2021 10:42 PM by Yadira Garcia PA-C     Secondary to fracture.   Continue pain medication as needed             S/P surgical manipulation of ankle joint ICD-10-CM: Z98.890  ICD-9-CM: V45.89  2/25/2021 - Present    Overview Signed 2/25/2021 10:43 PM by Yadira Garcia PA-C     Management par orthopedic                   Greater than 35 minutes were spent with the patient on counseling and coordination of care    Signed:   Zander Josue MD  2/28/2021  10:56 AM

## 2021-03-16 ENCOUNTER — OFFICE VISIT (OUTPATIENT)
Dept: ORTHOPEDIC SURGERY | Age: 61
End: 2021-03-16
Payer: OTHER GOVERNMENT

## 2021-03-16 DIAGNOSIS — M25.471 RIGHT ANKLE SWELLING: Primary | ICD-10-CM

## 2021-03-16 PROCEDURE — L4360 PNEUMAT WALKING BOOT PRE CST: HCPCS | Performed by: ORTHOPAEDIC SURGERY

## 2021-03-16 PROCEDURE — 99024 POSTOP FOLLOW-UP VISIT: CPT | Performed by: ORTHOPAEDIC SURGERY

## 2021-03-16 NOTE — LETTER
RX/DWO/POD 
DOS: 3/16/2021 Steven Contreras          1960                                                              [facility]ber[facility]# [id number] Wrist                     Foot/Ankle                         Knee Wrist Splint            Cam Boot                         Knee Immobilizer Thumb Spica         Lace Up Ankle Strap       J Sleeve Night Time Splint             T- Scope ROM Brace Short Runner OA Brace SHOULDER Sling Sling w/ Abduction Pillow ELBOW Elbow T-Scope ROM Brace Back Back Brace CRUTCHES Left    Right    __________ Size              IDC 10 Code:____________ I hereby acknowledge receipt of the above listed equipment. I acknowledge that the equipment is in good working order. I have received instructions on safe and proper use of the equipment, including cleaning and maintenance requirements, to my complete satisfaction. I also understand that this product is not able to be returned and is non refundable. I hereby request that payment of authorized Medicare/ third party insurance benefits be made on my behalf of [address] [place] [address] for assigned claims for any authorized equipment/product furnished by [facility]. Having read the foregoing terms and conditions of the agreement on this page , I do hereby agree to be bound thereby.  
 
 
_______________________________________         ____________________________ Patient/Legal Guardian Signature         Date            Representative Name

## 2021-03-16 NOTE — PATIENT INSTRUCTIONS
Broken Ankle: Care Instructions Your Care Instructions An ankle may break (fracture) during sports, a fall, or other accidents. Fractures can range from a small, hairline crack, to a bone or bones broken into two or more pieces. Your treatment depends on how bad the break is. Your doctor may have put your ankle in a splint or cast to allow it to heal or to keep it stable until you see another doctor. It may take weeks or months for your ankle to heal. You can help your ankle heal with some care at home. You heal best when you take good care of yourself. Eat a variety of healthy foods, and don't smoke. You may have had a sedative to help you relax. You may be unsteady after having sedation. It can take a few hours for the medicine's effects to wear off. Common side effects of sedation include nausea, vomiting, and feeling sleepy or tired. The doctor has checked you carefully, but problems can develop later. If you notice any problems or new symptoms,  get medical treatment right away. Follow-up care is a key part of your treatment and safety. Be sure to make and go to all appointments, and call your doctor if you are having problems. It's also a good idea to know your test results and keep a list of the medicines you take. How can you care for yourself at home? · If the doctor gave you a sedative: ? For 24 hours, don't do anything that requires attention to detail, such as going to work, making important decisions, or signing any legal documents. It takes time for the medicine's effects to completely wear off. 
? For your safety, do not drive or operate any machinery that could be dangerous. Wait until the medicine wears off and you can think clearly and react easily. · Put ice or a cold pack on your ankle for 10 to 20 minutes at a time. Try to do this every 1 to 2 hours for the next 3 days (when you are awake). Put a thin cloth between the ice and your cast or splint. Keep your cast or splint dry. · Follow the cast care instructions your doctor gives you. If you have a splint, do not take it off unless your doctor tells you to. · Be safe with medicines. Take pain medicines exactly as directed. ? If the doctor gave you a prescription medicine for pain, take it as prescribed. ? If you are not taking a prescription pain medicine, ask your doctor if you can take an over-the-counter medicine. · Prop up your leg on pillows in the first few days after the injury. Keep the ankle higher than the level of your heart. This will help reduce swelling. · Do not put weight on your ankle unless your doctor tells you to. Use crutches to walk. · Follow instructions for exercises to keep your leg strong. · Wiggle your toes often to reduce swelling and stiffness. When should you call for help? Call 911 anytime you think you may need emergency care. For example, call if: 
  · You have chest pain, are short of breath, or you cough up blood.  
  · You are very sleepy and you have trouble waking up. Call your doctor now or seek immediate medical care if: 
  · You have new or worse nausea or vomiting.  
  · You have new or worse pain.  
  · Your foot is cool or pale or changes color.  
  · You have tingling, weakness, or numbness in your toes.  
  · Your cast or splint feels too tight.  
  · You have signs of a blood clot in your leg (called a deep vein thrombosis), such as: 
? Pain in your calf, back of the knee, thigh, or groin. ? Redness or swelling in your leg. Watch closely for changes in your health, and be sure to contact your doctor if: 
  · You have a problem with your splint or cast.  
  · You do not get better as expected. Where can you learn more? Go to http://www.gray.com/ Enter P763 in the search box to learn more about \"Broken Ankle: Care Instructions. \" Current as of: March 2, 2020               Content Version: 12.6 © 6710-9242 [employer].   
Care instructions adapted under license by [employer] (which disclaims liability or warranty for this information). If you have questions about a medical condition or this instruction, always ask your healthcare professional. [address] any warranty or liability for your use of this information.

## 2021-03-16 NOTE — PROGRESS NOTES
Name: Florian Das    : 1960     Service Dept: [address] [place] [address] and Sports Medicine    Patient's Pharmacies:    [facility] [address] [address]  Phone: [phone] Fax: [phone]       Chief Complaint   Patient presents with    Ankle Pain    Surgical Follow-up        There were no vitals taken for this visit. No Known Allergies   Current Outpatient Medications   Medication Sig Dispense Refill    loratadine 10 mg cap Take 10 mg by mouth.  losartan (COZAAR) 50 mg tablet Take 50 mg by mouth daily.  hydroCHLOROthiazide (MICROZIDE) 12.5 mg capsule Take 12.5 mg by mouth daily.  Omeprazole delayed release (PRILOSEC D/R) 20 mg tablet Take 20 mg by mouth daily.  multivitamin (ONE A DAY) tablet Take 1 Tab by mouth daily.  calcium-cholecalciferol, d3, (CALCIUM 600 + D) 600-125 mg-unit tab Take 600 mg by mouth.  ascorbic acid, vitamin C, (Vitamin C) 500 mg tablet Take 500 mg by mouth daily.  atorvastatin (LIPITOR) 20 mg tablet Take 20 mg by mouth daily.  buPROPion XL (WELLBUTRIN XL) 300 mg XL tablet Take 300 mg by mouth every morning.  escitalopram oxalate (LEXAPRO) 20 mg tablet Take 20 mg by mouth daily. Patient Active Problem List   Diagnosis Code    Ankle fracture, bimalleolar, closed, right, initial encounter S82.841A    Left tibial fracture S82.202A    Talus fracture S92.109A    Diabetes mellitus type 2, controlled ([facility] E11.9    Essential hypertension I10    Hyperlipidemia, mixed E78.2    Chronic GERD K21.9    Depression, major F32.9    Acute right ankle pain M25.571    S/P surgical manipulation of ankle joint Z98.890      History reviewed. No pertinent family history.    Social History     Socioeconomic History    Marital status:      Spouse name: Not on file    Number of children: Not on file    Years of education: Not on file    Highest education level: Not on file   Tobacco Use    Smoking status: Never Smoker    Smokeless tobacco: Never Used   Substance and Sexual Activity    Alcohol use: Not Currently    Drug use: Not Currently      Past Surgical History:   Procedure Laterality Date    HX CERVICAL FUSION        Past Medical History:   Diagnosis Date    Diabetes ([address]     GERD (gastroesophageal reflux disease)     Hyperlipemia     Hypertension     Psychiatric disorder         I have reviewed and agree with PFSH and ROS and intake form in chart and the record furthermore I have reviewed prior medical record(s) regarding this patients care during this appointment. Review of Systems:   Patient is a pleasant appearing individual, appropriately dressed, well hydrated, well nourished, who is alert, appropriately oriented for age, and in no acute distress with a wheelchair bound gait and normal affect who does not appear to be in any significant pain. Physical Exam:  Left Ankle - No point tenderness, Full range of motion, No instability, No Weakness, No, skin lesions, No swelling, No instability, Grossly neurovascularly intact. Please note that a DME was provided from our office and fitted to an appropriate size. DME provided will help decrease soft tissue swelling, assist with stabilization, and decrease pain with immobilization. Encounter Diagnoses     ICD-10-CM ICD-9-CM   1. Right ankle swelling  M25.471 719.07       HPI:  The patient is status post right ankle ORIF on 2/25/2021, doing well from that standpoint. X-rays show well-aligned fracture with no any fracture displacement, well-healing incision. For the left ankle, she was diagnosed with a nondisplaced distal fibular fracture. Assessment/Plan: We are going to treat it conservatively with a Cam boot. Plan at this point for the right will be to take the staples out, put her in a Cam boot. She will be strict nonweightbearing.   I will see her back in 3 to 4 weeks to repeat x-rays of bilateral ankles, AP, lateral and mortise. We may consider gentle range of motion exercises for the right ankle, and we will start formal therapy for the left ankle for nondisplaced fracture. Continue aspirin for DVT prophylaxis until she is completely mobile. As part of continued conservative pain management options the patient was advised to utilize Tylenol or OTC NSAIDS as long as it is not medically contraindicated. Return to Office: Follow-up and Dispositions    · Return in about 4 weeks (around 4/13/2021) for w/ X-rays. Scribed by Yumiko Padilla MD as dictated by Ezequiel Galan. Edith Murdock MD.  Documentation True and Accepted Chad Murdock MD

## 2021-04-13 ENCOUNTER — OFFICE VISIT (OUTPATIENT)
Dept: ORTHOPEDIC SURGERY | Age: 61
End: 2021-04-13
Payer: OTHER GOVERNMENT

## 2021-04-13 DIAGNOSIS — M25.571 RIGHT ANKLE PAIN, UNSPECIFIED CHRONICITY: Primary | ICD-10-CM

## 2021-04-13 DIAGNOSIS — M25.572 LEFT ANKLE PAIN, UNSPECIFIED CHRONICITY: ICD-10-CM

## 2021-04-13 PROCEDURE — L1902 AFO ANKLE GAUNTLET PRE OTS: HCPCS | Performed by: ORTHOPAEDIC SURGERY

## 2021-04-13 PROCEDURE — 99024 POSTOP FOLLOW-UP VISIT: CPT | Performed by: ORTHOPAEDIC SURGERY

## 2021-04-13 NOTE — LETTER
RX/DWO/POD 
DOS: 4/13/2021 Coretta Beltran          1960                                                              Liz Pitts NPI# [id number] Wrist                     Foot/Ankle                         Knee Wrist Splint            Cam Boot                         Knee Immobilizer Thumb Spica         Lace Up Ankle Strap       J Sleeve Night Time Splint             T- Scope ROM Brace Short Runner OA Brace SHOULDER Sling Sling w/ Abduction Pillow ELBOW Elbow T-Scope ROM Brace Back Back Brace CRUTCHES Left    Right    __________ Size              IDC 10 Code:____________ I hereby acknowledge receipt of the above listed equipment. I acknowledge that the equipment is in good working order. I have received instructions on safe and proper use of the equipment, including cleaning and maintenance requirements, to my complete satisfaction. I also understand that this product is not able to be returned and is non refundable. I hereby request that payment of authorized Medicare/ third party insurance benefits be made on my behalf of [address] [place] [address] for assigned claims for any authorized equipment/product furnished by [facility]. Having read the foregoing terms and conditions of the agreement on this page , I do hereby agree to be bound thereby.  
 
 
_______________________________________         ____________________________ Patient/Legal Guardian Signature         Date            Representative Name

## 2021-04-13 NOTE — PATIENT INSTRUCTIONS
Ankle: Exercises Introduction Here are some examples of exercises for you to try. The exercises may be suggested for a condition or for rehabilitation. Start each exercise slowly. Ease off the exercises if you start to have pain. You will be told when to start these exercises and which ones will work best for you. How to do the exercises 'Alphabet' exercise 1. Trace the alphabet with your toe. This helps your ankle move in all directions. Side-to-side knee swing exercise 1. Sit in a chair with your foot flat on the floor. 2. Slowly move your knee from side to side while keeping your foot pressed flat. 3. Continue this exercise for 2 to 3 minutes. Towel curl 1. While sitting, place your foot on a towel on the floor and scrunch the towel toward you with your toes. 2. Then use your toes to push the towel away from you. 3. Make this exercise more challenging by placing a weighted object, such as a soup can, on the other end of the towel. Towel stretch 1. Sit with your legs extended and knees straight. 2. Place a towel around your foot just under the toes. 3. Hold each end of the towel in each hand, with your hands above your knees. 4. Pull back with the towel so that your foot stretches toward you. 5. Hold the position for at least 15 to 30 seconds. 6. Repeat 2 to 4 times a session, up to 5 sessions a day. Ankle eversion exercise 1. Start by sitting with your foot flat on the floor and pushing it outward against an immovable object such as the wall or heavy furniture. Hold for about 6 seconds, then relax. Repeat 8 to 12 times. 2. After you feel comfortable with this, try using rubber tubing looped around the outside of your feet for resistance. Push your foot out to the side against the tubing, and then count to 10 as you slowly bring your foot back to the middle. Repeat 8 to 12 times. Isometric opposition exercises 1.  While sitting, put your feet together flat on the floor. 
2. Press your injured foot inward against your other foot. Hold for about 6 seconds, and relax. Repeat 8 to 12 times. 3. Then place the heel of your other foot on top of the injured one. Push down with the top heel while trying to push up with your injured foot. Hold for about 6 seconds, and relax. Repeat 8 to 12 times. Follow-up care is a key part of your treatment and safety. Be sure to make and go to all appointments, and call your doctor if you are having problems. It's also a good idea to know your test results and keep a list of the medicines you take. Where can you learn more? Go to http://www.gray.com/ Enter X393 in the search box to learn more about \"Ankle: Exercises. \" Current as of: November 16, 2020               Content Version: 12.8 © 4272-1700 [employer]. Care instructions adapted under license by [employer] (which disclaims liability or warranty for this information). If you have questions about a medical condition or this instruction, always ask your healthcare professional. [address] any warranty or liability for your use of this information.

## 2021-04-20 ENCOUNTER — HOSPITAL ENCOUNTER (OUTPATIENT)
Dept: PHYSICAL THERAPY | Age: 61
Discharge: HOME | End: 2021-04-20
Payer: OTHER GOVERNMENT

## 2021-04-20 PROCEDURE — 97530 THERAPEUTIC ACTIVITIES: CPT

## 2021-04-20 PROCEDURE — 97161 PT EVAL LOW COMPLEX 20 MIN: CPT

## 2021-04-20 PROCEDURE — 97116 GAIT TRAINING THERAPY: CPT
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
## 2021-04-23 ENCOUNTER — HOSPITAL ENCOUNTER (OUTPATIENT)
Dept: PHYSICAL THERAPY | Age: 61
Discharge: HOME | End: 2021-04-23
Payer: OTHER GOVERNMENT

## 2021-04-23 PROCEDURE — 97110 THERAPEUTIC EXERCISES: CPT

## 2021-04-23 PROCEDURE — 97016 VASOPNEUMATIC DEVICE THERAPY: CPT

## 2021-04-23 NOTE — PROGRESS NOTES
PT DAILY TREATMENT NOTE 8-14    Patient Name: Nelli Cordon  Date:2021  : 1960  [x]  Patient  Verified  Payor: VITALY / Plan: Jerel Russell 74 / Product Type:  /    In KYES:[id number]  Out time:1024  Total Treatment Time (min): 65  Total Timed Codes (min): 45  1:1 Treatment Time (min): 45   Visit # 2      Treatment Area: No admission diagnoses are documented for this encounter. SUBJECTIVE  Pt enters today with RW with CAM boot on right & ankle brace on left, reports soreness on left.   Pain Level (0-10 scale): 0/10  Any medication changes, allergies to medications, adverse drug reactions, diagnosis change, or new procedure performed?: [x] No    [] Yes (see summary sheet for update)        OBJECTIVE  Modality rationale: decrease pain and increase tissue extensibility to improve the patients ability to participate in session   Min Type Additional Details    [] Estim: []Att   []Unatt  []TENS instruct                 []IFC  []Premod []NMES                       []Other:  []w/US   []w/ice   []w/heat  Position:  Location:    []  Traction: [] Cervical       []Lumbar                       [] Prone          []Supine                       []Intermittent   []Continuous Lbs:  [] before manual  [] after manual    []  Ultrasound: []Continuous   [] Pulsed                           []1MHz   []3MHz Location:  W/cm2:        10 []  Ice     [x]  heat  []  Ice massage Position: sitting w/ LE's propped  Location: B ankles   10 [x]  Vasopneumatic Device Pressure: [x] lo [] med [] hi   Temp: [x] lo [] med [] hi   [x] Skin assessment post-treatment:  [x]intact []redness- no adverse reaction       []redness  adverse reaction:       45 min Therapeutic Exercise:  [x] See flow sheet :   Rationale: increase ROM, increase strength, improve coordination, improve balance and increase proprioception to improve the patients ability to recover PLOF, independence & stability as directed by yojana's WB protocol With TE Patient Education: [x] Review HEP    [] Progressed/Changed HEP based on:   [] positioning   [] body mechanics   [] transfers   [x] heat/ice application          Pain Level (0-10 scale) post treatment: 0/10    ASSESSMENT/Changes in Function: Session initiated with MHP to B foot/ankle followed by HEP review with good recall observed. Progress today to include self stretching, arch lifts, and proximal strengthening. Pt encouraged to perform over weekend and also to ice regularly. Patient will continue to benefit from skilled PT services to modify and progress therapeutic interventions, address functional mobility deficits, address ROM deficits, address strength deficits, analyze and address soft tissue restrictions, analyze and cue movement patterns, analyze and modify body mechanics/ergonomics and assess and modify postural abnormalities to attain remaining goals.      [x]  See Plan of Care  []  See progress note/recertification  []  See Discharge Summary             PLAN  [x]  Upgrade activities as tolerated     [x]  Continue plan of care  []  Update interventions per flow sheet       []  Discharge due to:_  []  Other:_      Tc Carr PT, DPT 4/23/2021  9:19 AM

## 2021-04-26 ENCOUNTER — HOSPITAL ENCOUNTER (OUTPATIENT)
Dept: PHYSICAL THERAPY | Age: 61
Discharge: HOME | End: 2021-04-26
Payer: OTHER GOVERNMENT

## 2021-04-26 PROCEDURE — 97016 VASOPNEUMATIC DEVICE THERAPY: CPT

## 2021-04-26 PROCEDURE — 97110 THERAPEUTIC EXERCISES: CPT

## 2021-04-26 PROCEDURE — 97116 GAIT TRAINING THERAPY: CPT

## 2021-04-26 NOTE — PROGRESS NOTES
PT DAILY TREATMENT NOTE 8    Patient Name: Chrissy Rodriguez  Date:2021  : 1960  [x]  Patient  Verified  Payor: VITALY / Plan: Jerel Russell 74 / Product Type:  /    In time:9:22  Out time:10:30  Total Treatment Time (min): 68  Total Timed Codes (min): 58  1:1 Treatment Time (min): 58   Visit #: 3     Treatment Area: Pain in left ankle and joints of left foot [M25.572]    SUBJECTIVE  Patient enters using RW  With Cam boot on R LE and ankle brace on L ankle. She has a cane with er for instruction this visit. She denies pain but reports joint stiffness. Pain Level (0-10 scale): 0/10    Any medication changes, allergies to medications, adverse drug reactions, diagnosis change, or new procedure performed?: [x] No    [] Yes (see summary sheet for update)        OBJECTIVE  Modality rationale: decrease inflammation and increase tissue extensibility to improve the patients ability to tolerate today's session and to recover post treatment via Vaso.    Min Type Additional Details    [] Estim: []Att   []Unatt  []TENS instruct                 []IFC  []Premod []NMES                       []Other:  []w/US   []w/ice   []w/heat  Position:  Location:    []  Traction: [] Cervical       []Lumbar                       [] Prone          []Supine                       []Intermittent   []Continuous Lbs:  [] before manual  [] after manual    []  Ultrasound: []Continuous   [] Pulsed                           []1MHz   []3MHz Location:  W/cm2:   10 []  Ice     [x]  heat  []  Ice massage Position:Seated  Location: R ankle   10 [x]  Vasopneumatic Device Pressure: [x] lo [] med [] hi   Temp: [x] lo [] med [] hi   [] Skin assessment post-treatment:  []intact []redness- no adverse reaction       []redness  adverse reaction:       37 min Therapeutic Exercise:  [x] See flow sheet :   Rationale: increase ROM, increase strength, improve coordination and improve balance to improve the patients ability to return to PLOF, independence and stability following surgeons WB protocol. 10 min Gait Trainin feet with SPC device on level surfaces with SBA level of assist   Rationale: Increased WBing status on R LE to 75% with instruction for safety using SPC. With TE Patient Education: [x] Review HEP    [] Progressed/Changed HEP based on:   [] positioning   [] body mechanics   [] transfers   [] heat/ice application        Patient's response to today's treatment: Patient had on \"slip\" while using the [facility] and was able to self correct. Rest breaks taken as needed. Pain Level (0-10 scale) post treatment: 0/10    ASSESSMENT/Changes in Function: Session began with MHP to R foot/ankle followed by seated self stretching, arch lifts and proximal strengthening as documented on flow sheet. Introduction of stepper to progress weight bearing, endurance and functional activity. Instructed patient on progression to 75% WB on R LE and safety using SPC. Ended session with vaso to reduce inflammation post treatment. Patient will continue to benefit from skilled PT services to modify and progress therapeutic interventions, address functional mobility deficits, address ROM deficits, address strength deficits, analyze and address soft tissue restrictions, analyze and cue movement patterns, analyze and modify body mechanics/ergonomics and assess and modify postural abnormalities to attain remaining goals.      [x]  See Plan of Care  []  See progress note/recertification  []  See Discharge Summary           PLAN  []  Upgrade activities as tolerated     [x]  Continue plan of care  []  Update interventions per flow sheet       []  Discharge due to:_  []  Other:_      CAS Villa 2021  12:36 PM

## 2021-04-30 ENCOUNTER — HOSPITAL ENCOUNTER (OUTPATIENT)
Dept: PHYSICAL THERAPY | Age: 61
Discharge: HOME | End: 2021-04-30
Payer: OTHER GOVERNMENT

## 2021-04-30 PROCEDURE — 97110 THERAPEUTIC EXERCISES: CPT

## 2021-04-30 PROCEDURE — 97116 GAIT TRAINING THERAPY: CPT

## 2021-04-30 PROCEDURE — 97016 VASOPNEUMATIC DEVICE THERAPY: CPT

## 2021-04-30 NOTE — PROGRESS NOTES
PT DAILY TREATMENT NOTE 814    Patient Name: Chrissy Rodriguez  Date:2021  : 1960  [x]  Patient  Verified  Payor: VITALY / Plan: Jerel Russell 74 / Product Type:  /    In time:   Out time:1105  Total Treatment Time (min): 51  Total Timed Codes (min): 51  1:1 Treatment Time (min): 51   Visit # 4      Treatment Area: Pain in left ankle and joints of left foot [M25.572]    SUBJECTIVE  Pt enters clinic using RW with CAM boot on RLE and ankle brace L ankle. Pt also presents carrying SPC. Pt states she has been doing well and that she uses SPC at home when ambulating around the house. Pain Level (0-10 scale): 0/10  Any medication changes, allergies to medications, adverse drug reactions, diagnosis change, or new procedure performed?: [x] No    [] Yes (see summary sheet for update)        OBJECTIVE  Modality rationale: decrease inflammation and increase tissue extensibility to improve the patients ability to recover post physical therapy treatment.    Min Type Additional Details    [] Estim: []Att   []Unatt  []TENS instruct                 []IFC  []Premod []NMES                       []Other:  []w/US   []w/ice   []w/heat  Position:  Location:    []  Traction: [] Cervical       []Lumbar                       [] Prone          []Supine                       []Intermittent   []Continuous Lbs:  [] before manual  [] after manual    []  Ultrasound: []Continuous   [] Pulsed                           []1MHz   []3MHz Location:  W/cm2:         []  Ice     []  heat  []  Ice massage Position:  Location:   10 [x]  Vasopneumatic Device Pressure: [x] lo [] med [] hi   Temp: [x] lo [] med [] hi   [x] Skin assessment post-treatment:  [x]intact [x]redness- no adverse reaction       []redness  adverse reaction:       29 min Therapeutic Exercise:  [x] See flow sheet :   Rationale: increase ROM and increase strength to improve the patients ability to return to prior level of function before injury/illness with reduced pain, achieving optimal strength and function to perform household tasks, daily activities, and return to work if applicable. 12 min Gait Training:  _550 + 65 + 70 + 50__ feet Independent on level and unlevel surfaces. Rationale: to normalize gait pattern. With TE Patient Education: [x] Review HEP    [] Progressed/Changed HEP based on:   [] positioning   [] body mechanics   [] transfers   [] heat/ice application          Pain Level (0-10 scale) post treatment: 0/10    ASSESSMENT/Changes in Function:   Today's Tx began with stepper for active warm up followed by gait training to normalize gait pattern using SPC with Pt maintaining 75% WB RLE. Pt then progressed to self stretching and proximal strengthening BLE and strengthening of B foot intrinsics. Session then ended with vaso to R ankle to promote healing post Tx session. Patient will continue to benefit from skilled PT services to modify and progress therapeutic interventions, address functional mobility deficits, address ROM deficits, address strength deficits, analyze and address soft tissue restrictions, analyze and cue movement patterns, analyze and modify body mechanics/ergonomics, assess and modify postural abnormalities and instruct in home and community integration to attain remaining goals.      [x]  See Plan of Care  []  See progress note/recertification  []  See Discharge Summary             PLAN  [x]  Upgrade activities as tolerated     [x]  Continue plan of care  []  Update interventions per flow sheet       []  Discharge due to:_  []  Other:_      Sam Farrell, PT, DPT 4/30/2021  11:12 AM

## 2021-05-03 ENCOUNTER — APPOINTMENT (OUTPATIENT)
Dept: PHYSICAL THERAPY | Age: 61
End: 2021-05-03
Payer: OTHER GOVERNMENT

## 2021-05-07 ENCOUNTER — APPOINTMENT (OUTPATIENT)
Dept: PHYSICAL THERAPY | Age: 61
End: 2021-05-07
Payer: OTHER GOVERNMENT

## 2021-05-10 ENCOUNTER — HOSPITAL ENCOUNTER (OUTPATIENT)
Dept: PHYSICAL THERAPY | Age: 61
Discharge: HOME | End: 2021-05-10
Payer: OTHER GOVERNMENT

## 2021-05-10 PROCEDURE — 97110 THERAPEUTIC EXERCISES: CPT

## 2021-05-10 PROCEDURE — 97116 GAIT TRAINING THERAPY: CPT

## 2021-05-10 NOTE — PROGRESS NOTES
PT DAILY TREATMENT NOTE 8    Patient Name: Adi Salazar  Date:5/10/2021  : 1960  [x]  Patient  Verified  Payor:  / Plan: Jerel Russell 74 / Product Type:  /    In [id number]  Out time:1021  Total Treatment Time (min): 66  Total Timed Codes (min): 56  1:1 Treatment Time (min): 56   Visit #: 5    Treatment Area: Pain in left ankle and joints of left foot [M25.572]    SUBJECTIVE  Pt reports L ankle stiffness and pain. No issues or complaints about R ankle. Pt enters clinic using RW and carrying SPC. Pain Level (0-10 scale): 1    Any medication changes, allergies to medications, adverse drug reactions, diagnosis change, or new procedure performed?: [x] No    [] Yes (see summary sheet for update)    OBJECTIVE  Modality rationale: decrease pain and increase tissue extensibility to improve the patients ability to participation in physical therapy. Min Type Additional Details    [] Estim: []Att   []Unatt  []TENS instruct                 []IFC  []Premod []NMES                       []Other:  []w/US   []w/ice   []w/heat  Position:  Location:    []  Traction: [] Cervical       []Lumbar                       [] Prone          []Supine                       []Intermittent   []Continuous Lbs:  [] before manual  [] after manual    []  Ultrasound: []Continuous   [] Pulsed                           []1MHz   []3MHz Location:  W/cm2:   10 []  Ice     [x]  heat  []  Ice massage Position: Seated with L LE propped  Location: L ankle    []  Vasopneumatic Device Pressure: [] lo [] med [] hi   Temp: [] lo [] med [] hi   [] Skin assessment post-treatment:  []intact []redness- no adverse reaction       []redness  adverse reaction:     40 min Therapeutic Exercise:  [x] See flow sheet :   Rationale: increase ROM, increase strength, improve coordination, improve balance and increase proprioception to improve the patients ability to complete household activities pain free.     14 min Gait Training: Pt ambulated on leveled surface 300 ft + 150 ft without AD, Mod I. Pt had difficulty maintain neutral position externally rotating. Rationale: To promote a noramlized gait pattern using LRAD. With TE Patient Education: [x] Review HEP    [] Progressed/Changed HEP based on:   [] positioning   [] body mechanics   [] transfers   [] heat/ice application        Pain Level (0-10 scale) post treatment: 0    ASSESSMENT/Changes in Function: Pt was progressed to 100 % weight bearing. Today's session consisted of weight shifting promoting weight acceptance and confidence in R LE. Gait training folllowed without AD with cues provided to decrease R ankle external rotation. Continued with exercises to promoted B ankle stabilty, AROM, and strength. Will continue POC and progress to patient's tolerance. Patient will continue to benefit from skilled PT services to modify and progress therapeutic interventions, address functional mobility deficits, address ROM deficits, address strength deficits, analyze and address soft tissue restrictions, analyze and cue movement patterns, analyze and modify body mechanics/ergonomics, assess and modify postural abnormalities and address imbalance/dizziness to attain remaining goals.      [x]  See Plan of Care  []  See progress note/recertification  []  See Discharge Summary         PLAN  []  Upgrade activities as tolerated     [x]  Continue plan of care  []  Update interventions per flow sheet       []  Discharge due to:_  []  Other:_      CAS Ray  5/10/2021  10:24 AM

## 2021-05-11 ENCOUNTER — OFFICE VISIT (OUTPATIENT)
Dept: ORTHOPEDIC SURGERY | Age: 61
End: 2021-05-11
Payer: OTHER GOVERNMENT

## 2021-05-11 DIAGNOSIS — M25.572 BILATERAL ANKLE PAIN, UNSPECIFIED CHRONICITY: Primary | ICD-10-CM

## 2021-05-11 DIAGNOSIS — M25.571 BILATERAL ANKLE PAIN, UNSPECIFIED CHRONICITY: Primary | ICD-10-CM

## 2021-05-11 PROCEDURE — 99024 POSTOP FOLLOW-UP VISIT: CPT | Performed by: ORTHOPAEDIC SURGERY

## 2021-05-11 RX ORDER — OMEPRAZOLE 20 MG/1
CAPSULE, DELAYED RELEASE ORAL
COMMUNITY
Start: 2021-04-14

## 2021-05-11 RX ORDER — GABAPENTIN 100 MG/1
CAPSULE ORAL
COMMUNITY
Start: 2021-04-15

## 2021-05-11 NOTE — PATIENT INSTRUCTIONS
Ankle: Exercises Introduction Here are some examples of exercises for you to try. The exercises may be suggested for a condition or for rehabilitation. Start each exercise slowly. Ease off the exercises if you start to have pain. You will be told when to start these exercises and which ones will work best for you. How to do the exercises 'Alphabet' exercise 1. Trace the alphabet with your toe. This helps your ankle move in all directions. Side-to-side knee swing exercise 1. Sit in a chair with your foot flat on the floor. 2. Slowly move your knee from side to side while keeping your foot pressed flat. 3. Continue this exercise for 2 to 3 minutes. Towel curl 1. While sitting, place your foot on a towel on the floor and scrunch the towel toward you with your toes. 2. Then use your toes to push the towel away from you. 3. Make this exercise more challenging by placing a weighted object, such as a soup can, on the other end of the towel. Towel stretch 1. Sit with your legs extended and knees straight. 2. Place a towel around your foot just under the toes. 3. Hold each end of the towel in each hand, with your hands above your knees. 4. Pull back with the towel so that your foot stretches toward you. 5. Hold the position for at least 15 to 30 seconds. 6. Repeat 2 to 4 times a session, up to 5 sessions a day. Ankle eversion exercise 1. Start by sitting with your foot flat on the floor and pushing it outward against an immovable object such as the wall or heavy furniture. Hold for about 6 seconds, then relax. Repeat 8 to 12 times. 2. After you feel comfortable with this, try using rubber tubing looped around the outside of your feet for resistance. Push your foot out to the side against the tubing, and then count to 10 as you slowly bring your foot back to the middle. Repeat 8 to 12 times. Isometric opposition exercises 1.  While sitting, put your feet together flat on the floor. 
2. Press your injured foot inward against your other foot. Hold for about 6 seconds, and relax. Repeat 8 to 12 times. 3. Then place the heel of your other foot on top of the injured one. Push down with the top heel while trying to push up with your injured foot. Hold for about 6 seconds, and relax. Repeat 8 to 12 times. Follow-up care is a key part of your treatment and safety. Be sure to make and go to all appointments, and call your doctor if you are having problems. It's also a good idea to know your test results and keep a list of the medicines you take. Where can you learn more? Go to http://www.gunderson.com/ Enter M380 in the search box to learn more about \"Ankle: Exercises. \" Current as of: November 16, 2020               Content Version: 12.8 © 7473-1548 [employer]. Care instructions adapted under license by [employer] (which disclaims liability or warranty for this information). If you have questions about a medical condition or this instruction, always ask your healthcare professional. [address] any warranty or liability for your use of this information.

## 2021-05-11 NOTE — PROGRESS NOTES
Name: Samira Livingston    : 1960     Service Dept: [address] [facility]    Patient's Pharmacies:    [facility] [address] [address]  Phone: [phone] Fax: [phone]       Chief Complaint   Patient presents with    Ankle Pain        There were no vitals taken for this visit. No Known Allergies   Current Outpatient Medications   Medication Sig Dispense Refill    gabapentin (NEURONTIN) 100 mg capsule       omeprazole (PRILOSEC) 20 mg capsule       loratadine 10 mg cap Take 10 mg by mouth.  losartan (COZAAR) 50 mg tablet Take 50 mg by mouth daily.  hydroCHLOROthiazide (MICROZIDE) 12.5 mg capsule Take 12.5 mg by mouth daily.  multivitamin (ONE A DAY) tablet Take 1 Tab by mouth daily.  calcium-cholecalciferol, d3, (CALCIUM 600 + D) 600-125 mg-unit tab Take 600 mg by mouth.  ascorbic acid, vitamin C, (Vitamin C) 500 mg tablet Take 500 mg by mouth daily.  atorvastatin (LIPITOR) 20 mg tablet Take 20 mg by mouth daily.  buPROPion XL (WELLBUTRIN XL) 300 mg XL tablet Take 300 mg by mouth every morning.  escitalopram oxalate (LEXAPRO) 20 mg tablet Take 20 mg by mouth daily. Patient Active Problem List   Diagnosis Code    Ankle fracture, bimalleolar, closed, right, initial encounter S82.841A    Left tibial fracture S82.202A    Talus fracture S92.109A    Diabetes mellitus type 2, controlled ([facility] [address] E11.9    Essential hypertension I10    Hyperlipidemia, mixed E78.2    Chronic GERD K21.9    Depression, major F32.9    Acute right ankle pain M25.571    S/P surgical manipulation of ankle joint Z98.890      History reviewed. No pertinent family history.    Social History     Socioeconomic History    Marital status:      Spouse name: Not on file    Number of children: Not on file    Years of education: Not on file    Highest education level: Not on file   Tobacco Use    Smoking status: Never Smoker    Smokeless tobacco: Never Used   Substance and Sexual Activity    Alcohol use: Not Currently    Drug use: Not Currently      Past Surgical History:   Procedure Laterality Date    HX CERVICAL FUSION        Past Medical History:   Diagnosis Date    Diabetes ([address]     GERD (gastroesophageal reflux disease)     Hyperlipemia     Hypertension     Psychiatric disorder         I have reviewed and agree with PFSH and ROS and intake form in chart and the record furthermore I have reviewed prior medical record(s) regarding this patients care during this appointment. Review of Systems:   Patient is a pleasant appearing individual, appropriately dressed, well hydrated, well nourished, who is alert, appropriately oriented for age, and in no acute distress with a normal gait and normal affect who does not appear to be in any significant pain. Physical Exam:  Right Ankle-Point tenderness to palpation lateral aspect on ankle, Decreased range of motion with flexion and extension, No gross instability, Weakness with plantar flexion, No skin abrasions, Positive for swelling, Grossly neurovascularly intact. Left Ankle-Point tenderness to palpation lateral aspect on ankle, Decreased range of motion with flexion and extension, No gross instability, Weakness with plantar flexion, No skin abrasions, Positive for swelling, Grossly neurovascularly intact. Encounter Diagnoses     ICD-10-CM ICD-9-CM   1. Bilateral ankle pain, unspecified chronicity  M25.571 719.47    M25.572        HPI:  The patient is status post right ankle ORIF of the medial malleolar and lateral side. Doing well. X-rays show well-aligned fracture of her left ankle, well-healing distal fibular fracture. Assessment/Plan:  Plan at this point, conservative treatment, progressive weightbearing.    We will see her back in 6 weeks and repeat x-rays of bilateral ankle and she should have no restrictions at that point. As part of continued conservative pain management options the patient was advised to utilize Tylenol or OTC NSAIDS as long as it is not medically contraindicated. Return to Office: Follow-up and Dispositions    · Return in about 6 weeks (around 6/22/2021). Scribed by Jeffery Garcia LPN as dictated by [facility] NUZHAT De Souza MD.  Documentation True and Accepted [facility] NUZHAT De Souza MD

## 2021-05-14 ENCOUNTER — HOSPITAL ENCOUNTER (OUTPATIENT)
Dept: PHYSICAL THERAPY | Age: 61
Discharge: HOME | End: 2021-05-14
Payer: OTHER GOVERNMENT

## 2021-05-14 PROCEDURE — 97016 VASOPNEUMATIC DEVICE THERAPY: CPT

## 2021-05-14 PROCEDURE — 97110 THERAPEUTIC EXERCISES: CPT

## 2021-05-14 NOTE — PROGRESS NOTES
PT DAILY TREATMENT NOTE     Patient Name: Akash Reyes  Date:2021  : 1960  [x]  Patient  Verified  Payor:  / Plan: Jerel Russell 74 / Product Type:  /    In time:917  Out time:1019  Total Treatment Time (min): 62  Total Timed Codes (min): 52    Visit #: 6    Treatment Area: Pain in left ankle and joints of left foot [M25.572]    SUBJECTIVE  Pt reported that she felt she may need her cane today as she overdid her housework yesterday. States that she feels good but still has some numbness on her ankles at times. Lace up brace donned on R ankle today, none on L. Pain Level (0-10 scale): 3/10 R, 0/10 L    Any medication changes, allergies to medications, adverse drug reactions, diagnosis change, or new procedure performed?: [x] No    [] Yes (see summary sheet for update)        OBJECTIVE  Modality rationale: decrease inflammation, decrease pain and increase tissue extensibility to improve the patients ability to To perform daily exercises with improved mobility and decreased pain. CP/Vaso post session to decrease pain and edema from exercises.       Min Type Additional Details    [] Estim: []Att   []Unatt  []TENS instruct                 []IFC  []Premod []NMES                       []Other:  []w/US   []w/ice   []w/heat  Position:  Location:    []  Traction: [] Cervical       []Lumbar                       [] Prone          []Supine                       []Intermittent   []Continuous Lbs:  [] before manual  [] after manual    []  Ultrasound: []Continuous   [] Pulsed                           []1MHz   []3MHz Location:  W/cm2:   10 []  Ice     [x]  heat  []  Ice massage Position:R ankle/B ankles for ice  Location:   10 [x]  Vasopneumatic Device Pressure: [x] lo [] med [] hi   Temp: [x] lo [] med [] hi   [x] Skin assessment post-treatment:  [x]intact [x]redness- no adverse reaction       []redness  adverse reaction:       42 min Therapeutic Exercise:  [x] See flow sheet : Rationale: increase ROM, increase strength, improve coordination, improve balance and increase proprioception to improve the patients ability to return to prior level of function before injury/illness with reduced pain, achieving optimal strength and function to perform household tasks, daily activities, and return to community events, and/or work. With TE Patient Education: [x] Review HEP    [] Progressed/Changed HEP based on:   [] positioning   [] body mechanics   [] transfers   [] heat/ice application        Patient's response to today's treatment: Pt responded well to treatment today. Introduced HR and gastroc stretches in standing with no pain. Moved marble PU and toe scrunches to HEP as agility in foot is normal. Vaso on R and CP on L post session to decrease pain. Pain Level (0-10 scale) post treatment: 0/10    ASSESSMENT/Changes in Function: Continued with POC with exercises as noted per flow sheet. Pt able to tolerate today's session with minimal increase in pain, which resolved post exercise. Will cont to progress as able. Patient will continue to benefit from skilled PT services to address functional mobility deficits, address ROM deficits, address strength deficits, analyze and cue movement patterns and analyze and modify body mechanics/ergonomics to attain remaining goals.      [x]  See Plan of Care  []  See progress note/recertification  []  See Discharge Summary           PLAN  []  Upgrade activities as tolerated     [x]  Continue plan of care  []  Update interventions per flow sheet       []  Discharge due to:_  []  Other:_      CAS Dumont 5/14/2021  1:12 PM

## 2021-05-17 ENCOUNTER — HOSPITAL ENCOUNTER (OUTPATIENT)
Dept: PHYSICAL THERAPY | Age: 61
Discharge: HOME | End: 2021-05-17
Payer: OTHER GOVERNMENT

## 2021-05-17 PROCEDURE — 97110 THERAPEUTIC EXERCISES: CPT

## 2021-05-17 PROCEDURE — 97016 VASOPNEUMATIC DEVICE THERAPY: CPT

## 2021-05-17 NOTE — PROGRESS NOTES
PT DAILY TREATMENT NOTE 8    Patient Name: Beltran Wright  Date:2021  : 1960  [x]  Patient  Verified  Payor: VITALY / Plan: Jerel Russell 74 / Product Type:  /    In time:9:30  Out time:10:39  Total Treatment Time (min): 69  Total Timed Codes (min): 59  1:1 Treatment Time (min): 59   Visit #: 7     Treatment Area: Pain in left ankle and joints of left foot [M25.572]    SUBJECTIVE  My ankle just hurts more than it has been. \"I might have overdone yesterday as I picked up a container 25# of cat litter. \"    Pain Level (0-10 scale): 5/10    Any medication changes, allergies to medications, adverse drug reactions, diagnosis change, or new procedure performed?: [x] No    [] Yes (see summary sheet for update)        OBJECTIVE  Modality rationale: decrease inflammation, decrease pain and increase tissue extensibility to improve the patients ability to To perform daily exercises with improved mobility and decreased pain. CP/Vaso post session to decrease pain and edema from exercises.     Min Type Additional Details    [] Estim: []Att   []Unatt  []TENS instruct                 []IFC  []Premod []NMES                       []Other:  []w/US   []w/ice   []w/heat  Position:  Location:    []  Traction: [] Cervical       []Lumbar                       [] Prone          []Supine                       []Intermittent   []Continuous Lbs:  [] before manual  [] after manual    []  Ultrasound: []Continuous   [] Pulsed                           []1MHz   []3MHz Location:  W/cm2:   10 []  Ice     [x]  heat  []  Ice massage Position: Seated   Location: R ankle    10 [x]  Vasopneumatic Device Pressure: [x] lo [] med [] hi   Temp: [x] lo [] med [] hi   [] Skin assessment post-treatment:  []intact []redness- no adverse reaction       []redness  adverse reaction:       49 min Therapeutic Exercise:  [x] See flow sheet :   Rationale: increase ROM, increase strength and improve coordination to improve the patients ability to return to prior level of function before injury/illness with reduced pain, achieving optimal strength and function to perform household tasks, daily activities, and return to community events, and/or work. With TE Patient Education: [x] Review HEP    [] Progressed/Changed HEP based on:   [] positioning   [x] body mechanics   [] transfers   [] heat/ice application        Patient's response to today's treatment: Unable to do soleus stretch due to pain. Able to complete all other exercise this date. Pain Level (0-10 scale) post treatment: 3/10    ASSESSMENT/Changes in Function: Session began with MHP to R ankle. Followed by AROM via alphabet then standing exercise as documented on flow sheet. PROM was performed to R ankle to address patient complaint of pain. No noted restriction or pain with PROM. Able to tolerate stepper with Level 2 resistance. Ambulates in gym with SPC to no AD independently. Ended treatment with vaso compression to R ankle to continue to address inflammation reduction. Plan to continue with POC. Patient will continue to benefit from skilled PT services to modify and progress therapeutic interventions, address functional mobility deficits, address ROM deficits, address strength deficits, analyze and cue movement patterns and analyze and modify body mechanics/ergonomics to attain remaining goals.      [x]  See Plan of Care  []  See progress note/recertification  []  See Discharge Summary           PLAN  []  Upgrade activities as tolerated     [x]  Continue plan of care  []  Update interventions per flow sheet       []  Discharge due to:_  []  Other:_      CAS Paul 5/17/2021  9:32 AM

## 2021-05-21 ENCOUNTER — HOSPITAL ENCOUNTER (OUTPATIENT)
Dept: PHYSICAL THERAPY | Age: 61
Discharge: HOME | End: 2021-05-21
Payer: OTHER GOVERNMENT

## 2021-05-21 PROCEDURE — 97016 VASOPNEUMATIC DEVICE THERAPY: CPT

## 2021-05-21 PROCEDURE — 97110 THERAPEUTIC EXERCISES: CPT

## 2021-05-21 NOTE — PROGRESS NOTES
PT DAILY TREATMENT NOTE 8-14    Patient Name: Felecia Banda  Date:2021  : 1960  [x]  Patient  Verified  Payor: VITALY / Plan: Jerel Russell 74 / Product Type:  /    In time:9:30  Out time:10:40  Total Treatment Time (min): 70  Total Timed Codes (min): 60  1:1 Treatment Time (min): 60   Visit #: 8     Treatment Area: Pain in left ankle and joints of left foot [M25.572]    SUBJECTIVE  Patient reports that when she takes her \"shower in the morning to come to PT it aggravates her ankle from standing in the shower. \"    Pain Level (0-10 scale): 4/10    Any medication changes, allergies to medications, adverse drug reactions, diagnosis change, or new procedure performed?: [x] No    [] Yes (see summary sheet for update)        OBJECTIVE  Modality rationale: decrease inflammation, decrease pain and increase tissue extensibility to improve the patients ability to tolerate today's session with improved mobility and decreased pain and recover post treatment with vaso compression.    Min Type Additional Details    [] Estim: []Att   []Unatt  []TENS instruct                 []IFC  []Premod []NMES                       []Other:  []w/US   []w/ice   []w/heat  Position:  Location:    []  Traction: [] Cervical       []Lumbar                       [] Prone          []Supine                       []Intermittent   []Continuous Lbs:  [] before manual  [] after manual    []  Ultrasound: []Continuous   [] Pulsed                           []1MHz   []3MHz Location:  W/cm2:   10 []  Ice     [x]  heat  []  Ice massage Position:Seated   Location: R ankle   10 [x]  Vasopneumatic Device Pressure: [x] lo [] med [] hi   Temp: [x] lo [] med [] hi   [] Skin assessment post-treatment:  []intact []redness- no adverse reaction       []redness  adverse reaction:       50 min Therapeutic Exercise:  [x] See flow sheet :   Rationale: increase ROM, increase strength, improve coordination, improve balance and increase proprioception to improve the patients ability to return to prior level of function before injury/illness with reduced pain, achieving optimal strength and function to perform household tasks, daily activities, and return to community events, and/or work. With TE Patient Education: [x] Review HEP    [] Progressed/Changed HEP based on:   [] positioning   [] body mechanics   [] transfers   [] heat/ice application        Patient's response to today's treatment: Patient tolerated her exercise with slight increase in pain. Pain reduced post Vaso compression. Patient education for showering to use her shower chair as needed to reduce pain standing in the shower. Pain Level (0-10 scale) post treatment: 2/10    ASSESSMENT/Changes in Function: Session began with MHP to R ankle. Followed by AROM via alphabet. PROM to R ankle performed tis date with no pain evoked. Standing exercise as documented on flow sheet. Able to tolerate stepper with Level 2 resistance. Ambulates in gym with no AD independently. Ended treatment with vaso compression to R ankle to continue to address inflammation reduction. Plan to continue with POC. Patient will continue to benefit from skilled PT services to modify and progress therapeutic interventions, address functional mobility deficits, address ROM deficits, address strength deficits, analyze and address soft tissue restrictions, analyze and cue movement patterns, analyze and modify body mechanics/ergonomics, assess and modify postural abnormalities and address imbalance/dizziness to attain remaining goals.      [x]  See Plan of Care  []  See progress note/recertification  []  See Discharge Summary           PLAN  []  Upgrade activities as tolerated     [x]  Continue plan of care  []  Update interventions per flow sheet       []  Discharge due to:_  []  Other:_      CAS Crawley 5/21/2021  12:21 PM

## 2021-05-24 ENCOUNTER — HOSPITAL ENCOUNTER (OUTPATIENT)
Dept: PHYSICAL THERAPY | Age: 61
Discharge: HOME | End: 2021-05-24
Payer: OTHER GOVERNMENT

## 2021-05-24 PROCEDURE — 97016 VASOPNEUMATIC DEVICE THERAPY: CPT

## 2021-05-24 PROCEDURE — 97110 THERAPEUTIC EXERCISES: CPT

## 2021-05-24 NOTE — PROGRESS NOTES
PT DAILY TREATMENT NOTE 814    Patient Name: Tommie Schuler  Date:2021  : 1960  [x]  Patient  Verified  Payor: VITALY / Plan: Jerel Russell 74 / Product Type: Vincraul Pool /    In time:930  Out time:103  Total Treatment Time (min): 62  Total Timed Codes (min): 42  1:1 Treatment Time (min): 42   Visit # 9      Treatment Area: Pain in left ankle and joints of left foot [M25.572]    SUBJECTIVE  Pt enters today with SPC and ankle brace on right foot. Pt carries cane into clinic and states that she is able to walk better without AD.   Pain Level (0-10 scale): 0/10  Any medication changes, allergies to medications, adverse drug reactions, diagnosis change, or new procedure performed?: [x] No    [] Yes (see summary sheet for update)        OBJECTIVE  Modality rationale: decrease inflammation, decrease pain and increase tissue extensibility to improve the patients ability to move/heal optimally   Min Type Additional Details    [] Estim: []Att   []Unatt  []TENS instruct                 []IFC  []Premod []NMES                       []Other:  []w/US   []w/ice   []w/heat  Position:  Location:    []  Traction: [] Cervical       []Lumbar                       [] Prone          []Supine                       []Intermittent   []Continuous Lbs:  [] before manual  [] after manual    []  Ultrasound: []Continuous   [] Pulsed                           []1MHz   []3MHz Location:  W/cm2:        10 []  Ice     [x]  heat  []  Ice massage Position: sitting with LE propped  Location: right foot/ankle   10 [x]  Vasopneumatic Device Pressure: [x] lo [] med [] hi   Temp: [x] lo [] med [] hi   [x] Skin assessment post-treatment:  [x]intact []redness- no adverse reaction       []redness  adverse reaction:       42 min Therapeutic Exercise:  [x] See flow sheet :   Rationale: increase ROM, increase strength, improve coordination, improve balance and increase proprioception to improve the patients ability to promote independence, safety & stability with restoring upright functional mobility & weaning out of brace             With TE Patient Education: [x] Review HEP    [] Progressed/Changed HEP based on:   [] positioning   [] body mechanics   [] transfers   [] heat/ice application          Pain Level (0-10 scale) post treatment: 0/10    ASSESSMENT/Changes in Function: Session initiated with MHP followed by seated AAROM. Self stretching, weight shifting and balance challenges performed in // bars. [place] stance introduced to challenge unilateral stability. No UE support require, no LOB observed. Step negotiation performed on 6 inch steps with HR as needed via reciprocal pattern. Descent proves painful to left knee. Ice applied to left knee post-session. No increase in R ankle pain reported this visit without brace. Pt advised to gradually wean out of brace. Plan to formally reassess progress next visit. Patient will continue to benefit from skilled PT services to modify and progress therapeutic interventions, address functional mobility deficits, address ROM deficits, address strength deficits, analyze and address soft tissue restrictions, analyze and cue movement patterns, analyze and modify body mechanics/ergonomics and assess and modify postural abnormalities to attain remaining goals.      [x]  See Plan of Care  []  See progress note/recertification  []  See Discharge Summary             PLAN  [x]  Upgrade activities as tolerated     [x]  Continue plan of care  []  Update interventions per flow sheet       []  Discharge due to:_  []  Other:_      Andres Laguerre, PT, DPT 5/24/2021  9:29 AM

## 2021-05-28 ENCOUNTER — APPOINTMENT (OUTPATIENT)
Dept: PHYSICAL THERAPY | Age: 61
End: 2021-05-28
Payer: OTHER GOVERNMENT

## 2021-06-01 ENCOUNTER — HOSPITAL ENCOUNTER (OUTPATIENT)
Dept: PHYSICAL THERAPY | Age: 61
Discharge: HOME | End: 2021-06-01
Payer: OTHER GOVERNMENT

## 2021-06-01 PROCEDURE — 97110 THERAPEUTIC EXERCISES: CPT

## 2021-06-01 PROCEDURE — 97016 VASOPNEUMATIC DEVICE THERAPY: CPT

## 2021-06-01 NOTE — PROGRESS NOTES
[facility] Oneill, [address] [place] [address]  Phone: [phone]    Fax: [phone]   Progress Note/CONTINUED PLAN OF CARE for PHYSICAL THERAPY          Patient Name: Jeff Shay : 1960   Treatment/Medical Diagnosis: Pain in left ankle and joints of left foot [M25.572]   Onset Date: 2021    Referral Source: Derrek García MD [facility] [place]): 2021   Prior Hospitalization: See Medical History Provider #: [id number]   Prior Level of Function: Independent   Comorbidities: Diabetes, Hyperlipidemia, HTN   Medications: Verified on Patient Summary List   Visits from [facility]: 10 Missed Visits: 0       Objective Measures:     ROM / Strength  []? ? Unable to assess                AROM                         PROM                   Strength       Left Right Left Right Left Right   Hip Flexion         5/5 5/5     Extension                 Abduction         5/5 5/5     Adduction         5/5 5/5   Knee Flexion         4+/5 4/5     Extension         4+/5 4/5   Ankle Plantarflexion 45 deg  35 deg   40 deg 4/5 4/5     Dorsiflexion  Inversion  Eversion 15 deg  28 deg  25 deg 10 deg  30 deg  28 deg   35 deg  25 deg 15 deg  32 deg  30 deg 4+/5  4+/5  4+/5 4+/5  4/5  3/5         Girth Measurements:               Inframalleolar                         Left 26.5 cm        Right 26 cm                                 Gait:                []? ? Normal    []? ? Abnormal    [x]? ? Antalgic    []? ? NWB                             Distance: 450 feet on level surfaces without AD nor ankle brace, asymmetrical stance times,    decreased push off     LEFS: 62/80 (33 point improvement from initial)  TU seconds without AD (20 second faster than initial measure)     Short Term Goals:  1. Patient will report the knowledge of 3 exercises that can be used to help reduce symptoms to be able to ind reduce symptoms while at home. MET.   2. Patient will demonstrate a 1/2 grade improvement in B ankle MMT to be able to better ambulate with a normalized gait without pain. MET, as listed above. 3. Patient will demonstrate R ankle DF PROM of > 15  deg to facilitate increased ability to ascend/descend stairs. MET, as listed above. 4. Patient will increase LEFS > 10 points to facilitate increased ability to perform functional activities of choice. MET, as listed above.     Long Term Goals:  1. Patient will demonstrate a 2 grade improvement in B ankle MMT to be able to better ambulate with a normalized gait without pain. Progressing, as listed above. 2. Patient will demonstrate B ankle DF AROM of 15 deg or greater to be able to return to normal ambulation on level and unlevel surfaces. Progressing, as listed above. 3. Patient will demonstrate the ability to ambulate > 500 feet without an AD without evidence of antalgia to be able to return to an ind walking program following discharge. Progressing, as listed above. 4. Patient will increase LEFS > 20 points to facilitate increased ability to perform leisure activities of choice. MET, as listed above.        Assessment/ Patient Update: Pt is s/p R ankle ORIF 2/25/2021 secondary to accidental fall including left ankle injury and has made measurable improvements in ankle ROM, strength, standing stability and balance, functional independence over pas 6 weeks of rehabilitation. Pt has progressed from RW to no AD and is gradually weaning out of stirrup ankle brace. Pt will benefit from continued skilled PT intervention to target below deficits in effort to maximize pain-free daily activity & functional restoration within home & community.        Problem List: pain affecting function, decrease ROM, decrease strength, impaired gait/ balance, decrease ADL/ functional abilitiies, decrease activity tolerance, decrease flexibility/ joint mobility and decrease transfer abilities     Updated Plan of Care:    Treatment Plan to include the following per provider discretion: Therapeutic exercise, Neuromuscular re-education, Physical agent/modality, Gait/balance training, Manual therapy, Patient education, Self Care training, Functional mobility training, Home safety training and Stair training    Frequency / Duration:  Patient to be seen   2-3   times per week for   6  weeks        If you have any questions/comments please contact us directly at [phone]. Thank you for allowing us to assist in the care of your patient. Therapist Signature: Ari Vickers PT, DPEAGLE Date: 7/3/0361   Certification Period:  Reporting Period: 06/01/2021 - 08/15/2021  4/20/2021 - 06/01/2021 Time: 9:24 AM   NOTE TO PHYSICIAN:  PLEASE COMPLETE THE ORDERS BELOW AND FAX TO   [facility] [place] [facility]: [phone]. If you are unable to process this request in 24 hours please contact our office: [phone].    ___ I have read the above report and request that my patient continue as recommended.   ___ I have read the above report and request that my patient continue therapy with the following changes/special instructions: ________________________________________________   ___ I have read the above report and request that my patient be discharged from therapy.      Physician Signature:        Date:       Time:

## 2021-06-01 NOTE — PROGRESS NOTES
PT DAILY TREATMENT NOTE 8-14    Patient Name: Radha Villar  Date:2021  : 1960  [x]  Patient  Verified  Payor:  / Plan: Zulma Goode / Product Type:  /    In [id number]  Out time:1034  Total Treatment Time (min): 66  Total Timed Codes (min): 46  1:1 Treatment Time (min): 46   Visit # 10      Treatment Area: Pain in left ankle and joints of left foot [M25.572]    SUBJECTIVE  Pt enters today with SPC and ankle brace on right foot, states that she has gradually been weaning out of it, especially when driving.   Pain Level (0-10 scale): 1/10  Any medication changes, allergies to medications, adverse drug reactions, diagnosis change, or new procedure performed?: [x] No    [] Yes (see summary sheet for update)        OBJECTIVE  Modality rationale: decrease inflammation, decrease pain and increase tissue extensibility to improve the patients ability to move/heal optimally   Min Type Additional Details    [] Estim: []Att   []Unatt  []TENS instruct                 []IFC  []Premod []NMES                       []Other:  []w/US   []w/ice   []w/heat  Position:  Location:    []  Traction: [] Cervical       []Lumbar                       [] Prone          []Supine                       []Intermittent   []Continuous Lbs:  [] before manual  [] after manual    []  Ultrasound: []Continuous   [] Pulsed                           []1MHz   []3MHz Location:  W/cm2:        10 []  Ice     [x]  heat  []  Ice massage Position: sitting w/ LE propped   Location: R foot/ankle   10 [x]  Vasopneumatic Device Pressure: [x] lo [] med [] hi   Temp: [x] lo [] med [] hi   [x] Skin assessment post-treatment:  [x]intact []redness- no adverse reaction       []redness  adverse reaction:       46 min Therapeutic Exercise:  [x] See flow sheet :   Rationale: increase ROM, increase strength, improve coordination, improve balance and increase proprioception to improve the patients ability to maximize pain-free daily activity, restore PLOF and improve confidence / reduce fear of falls             With TE Patient Education: [x] Review HEP    [] Progressed/Changed HEP based on:   [] positioning   [] body mechanics   [] transfers   [x] heat/ice application          Pain Level (0-10 scale) post treatment: 0/10    ASSESSMENT/Changes in Function: Session initiated with MHP followed by reassessment of ankle ROM, strength, girth, functional mobility, balance & confidence with standing activity. Progression today of CKC stabilization and strengthening with intermittent use of UEs to maintain balance & SBA for safety. Pt encouraged to wean out of brace 4-6 hours day, icing afterwards with plans to progress towards independence. Patient will continue to benefit from skilled PT services to modify and progress therapeutic interventions, address functional mobility deficits, address ROM deficits, address strength deficits, analyze and address soft tissue restrictions, analyze and cue movement patterns, analyze and modify body mechanics/ergonomics and assess and modify postural abnormalities to attain remaining goals.      []  See Plan of Care  [x]  See progress note/recertification  []  See Discharge Summary             PLAN  []  Upgrade activities as tolerated     []  Continue plan of care  [x]  Update interventions per flow sheet       []  Discharge due to:_  []  Other:_      Idalia Blanco, PT, DPT 6/1/2021  9:23 AM

## 2021-06-03 ENCOUNTER — HOSPITAL ENCOUNTER (OUTPATIENT)
Dept: PHYSICAL THERAPY | Age: 61
Discharge: HOME | End: 2021-06-03
Payer: OTHER GOVERNMENT

## 2021-06-03 PROCEDURE — 97110 THERAPEUTIC EXERCISES: CPT

## 2021-06-03 PROCEDURE — 97016 VASOPNEUMATIC DEVICE THERAPY: CPT

## 2021-06-03 NOTE — PROGRESS NOTES
PT DAILY TREATMENT NOTE 8-14    Patient Name: Gianfranco Velez  Date:6/3/2021  : 1960  [x]  Patient  Verified  Payor:  / Plan: Jerel Russell 74 / Product Type:  /    In [id number]  Out time:1026  Total Treatment Time (min): 59  Total Timed Codes (min): 39  1:1 Treatment Time (min): 39   Visit # 11      Treatment Area: Pain in left ankle and joints of left foot [M25.572]    SUBJECTIVE  Pt reports increased pain upon arrival today, states she's been doing more walking even planted some peterson in her yard earlier this week. Pt reports icing her ankles regularly.    Pain Level (0-10 scale): 4/10, B ankles  Any medication changes, allergies to medications, adverse drug reactions, diagnosis change, or new procedure performed?: [x] No    [] Yes (see summary sheet for update)        OBJECTIVE  Modality rationale: decrease inflammation, decrease pain and increase tissue extensibility to improve the patients ability to move/heal optimally   Min Type Additional Details    [] Estim: []Att   []Unatt  []TENS instruct                 []IFC  []Premod []NMES                       []Other:  []w/US   []w/ice   []w/heat  Position:  Location:    []  Traction: [] Cervical       []Lumbar                       [] Prone          []Supine                       []Intermittent   []Continuous Lbs:  [] before manual  [] after manual    []  Ultrasound: []Continuous   [] Pulsed                           []1MHz   []3MHz Location:  W/cm2:        10 []  Ice     [x]  heat  []  Ice massage Position: sitting with LE propped  Location: B ankles   10 [x]  Vasopneumatic Device Pressure: [x] lo [] med [] hi   Temp: [x] lo [] med [] hi   [x] Skin assessment post-treatment:  [x]intact []redness- no adverse reaction       []redness  adverse reaction:       39 min Therapeutic Exercise:  [x] See flow sheet :   Rationale: increase ROM, increase strength, improve coordination, improve balance and increase proprioception to improve the patients ability to maximize pain-free daily activity, restore PLOF           With TE Patient Education: [x] Review HEP    [] Progressed/Changed HEP based on:   [] positioning   [] body mechanics   [] transfers   [x] heat/ice application          Pain Level (0-10 scale) post treatment: 2/10, right foot/ankle    ASSESSMENT/Changes in Function: Session  Initiated with MHP followed by active warm up on stepper without complaints. Focus today on standing stabilization, proprioception via cone taps, captain georgi stance, stepping on/off bosu, SLS and side stepping. Unlevel surfaces introduced via dafne disc with static stance and via bosu with dynamic. Clams introduced to target proximal musculature with cues for proper set up. Left knee pain limits squat and CKC progression. Pt encouraged to ice regularly over weekend and not to overdo with activity when pain presents. Patient will continue to benefit from skilled PT services to modify and progress therapeutic interventions, address functional mobility deficits, address ROM deficits, address strength deficits, analyze and address soft tissue restrictions, analyze and cue movement patterns, analyze and modify body mechanics/ergonomics and assess and modify postural abnormalities to attain remaining goals.      [x]  See Plan of Care  []  See progress note/recertification  []  See Discharge Summary             PLAN  [x]  Upgrade activities as tolerated     [x]  Continue plan of care  []  Update interventions per flow sheet       []  Discharge due to:_  []  Other:_      Jeff Escamilla PT, DPT 6/3/2021  9:26 AM

## 2021-06-07 ENCOUNTER — HOSPITAL ENCOUNTER (OUTPATIENT)
Dept: PHYSICAL THERAPY | Age: 61
Discharge: HOME | End: 2021-06-07
Payer: OTHER GOVERNMENT

## 2021-06-07 PROCEDURE — 97110 THERAPEUTIC EXERCISES: CPT

## 2021-06-07 PROCEDURE — 97016 VASOPNEUMATIC DEVICE THERAPY: CPT

## 2021-06-07 NOTE — PROGRESS NOTES
PT DAILY TREATMENT NOTE 8-14    Patient Name: Ayesha Vela  Date:2021  : 1960  [x]  Patient  Verified  Payor:  / Plan: Jerel Russell 74 / Product Type:  /    In [id number]  Out time:1031  Total Treatment Time (min): 66  Total Timed Codes (min): 46  1:1 Treatment Time (min): 46   Visit # 12      Treatment Area: Pain in left ankle and joints of left foot [M25.572]    SUBJECTIVE  Pt states that she went without brace over weekend and finds that she walks better without it. Pt describes numbness along top of right foot now rather than along incisions.   Pain Level (0-10 scale): 1/10  Any medication changes, allergies to medications, adverse drug reactions, diagnosis change, or new procedure performed?: [x] No    [] Yes (see summary sheet for update)        OBJECTIVE  Modality rationale: decrease inflammation, decrease pain and increase tissue extensibility to improve the patients ability to move/heal optimally   Min Type Additional Details    [] Estim: []Att   []Unatt  []TENS instruct                 []IFC  []Premod []NMES                       []Other:  []w/US   []w/ice   []w/heat  Position:  Location:    []  Traction: [] Cervical       []Lumbar                       [] Prone          []Supine                       []Intermittent   []Continuous Lbs:  [] before manual  [] after manual    []  Ultrasound: []Continuous   [] Pulsed                           []1MHz   []3MHz Location:  W/cm2:        10 []  Ice     [x]  heat  []  Ice massage Position: sitting w/ LE propped  Location: B ankles   10 [x]  Vasopneumatic Device Pressure: [x] lo [] med [] hi   Temp: [x] lo [] med [] hi   [x] Skin assessment post-treatment:  [x]intact []redness- no adverse reaction       []redness  adverse reaction:       46 min Therapeutic Exercise:  [x] See flow sheet :   Rationale: increase ROM, increase strength, improve coordination, improve balance and increase proprioception to improve the patients ability to maximize pain-free daily activity, promote endurance & stability for restoration of PLOF         With TE Patient Education: [x] Review HEP    [] Progressed/Changed HEP based on:   [] positioning   [] body mechanics   [] transfers   [x] heat/ice application          Pain Level (0-10 scale) post treatment: 2/10 in right ankle    ASSESSMENT/Changes in Function: Session initiated with MHP to both ankles followed by active warm up on stationary bike to promote muscular endurance and ankle mobility. Balance and stabilization challenged in // bars with intermittent use of UE for balance. Ankle mobility targeted today via BAPS board and step downs to promote plantarfexion and promote function. Resistance added with side stepping proximal to knees for proximal strengthening, no complaints. Plan to further address standing balance, stabilization, endurance and strength while avoiding L knee pain next visit. Patient will continue to benefit from skilled PT services to modify and progress therapeutic interventions, address functional mobility deficits, address ROM deficits, address strength deficits, analyze and address soft tissue restrictions, analyze and cue movement patterns and analyze and modify body mechanics/ergonomics to attain remaining goals.      [x]  See Plan of Care  []  See progress note/recertification  []  See Discharge Summary             PLAN  [x]  Upgrade activities as tolerated     [x]  Continue plan of care  []  Update interventions per flow sheet       []  Discharge due to:_  []  Other:_      Demetrius Parents, PT, DPT 6/7/2021  9:35 AM

## 2021-06-10 ENCOUNTER — APPOINTMENT (OUTPATIENT)
Dept: PHYSICAL THERAPY | Age: 61
End: 2021-06-10
Payer: OTHER GOVERNMENT

## 2021-06-14 ENCOUNTER — HOSPITAL ENCOUNTER (OUTPATIENT)
Dept: PHYSICAL THERAPY | Age: 61
Discharge: HOME | End: 2021-06-14
Payer: OTHER GOVERNMENT

## 2021-06-14 PROCEDURE — 97110 THERAPEUTIC EXERCISES: CPT

## 2021-06-14 PROCEDURE — 97016 VASOPNEUMATIC DEVICE THERAPY: CPT

## 2021-06-14 NOTE — PROGRESS NOTES
PT DAILY TREATMENT NOTE     Patient Name: Azra Tony  Date:2021  : 1960  [x]  Patient  Verified  Payor: VITALY / Plan: Jerel Russell 74 / Product Type: [employer] Spatz /    In [id number]  Out time:1033  Total Treatment Time (min): 66  Total Timed Codes (min): 46  1:1 Treatment Time (min): 46   Visit # 13      Treatment Area: Pain in left ankle and joints of left foot [M25.572]    SUBJECTIVE  Pt reports wearing Shruti's at a picnic yesterday and has increased soreness today.   Pain Level (0-10 scale): 3/10  Any medication changes, allergies to medications, adverse drug reactions, diagnosis change, or new procedure performed?: [x] No    [] Yes (see summary sheet for update)        OBJECTIVE  Modality rationale: decrease inflammation, decrease pain and increase tissue extensibility to improve the patients ability to move/heal optimally   Min Type Additional Details    [] Estim: []Att   []Unatt  []TENS instruct                 []IFC  []Premod []NMES                       []Other:  []w/US   []w/ice   []w/heat  Position:  Location:    []  Traction: [] Cervical       []Lumbar                       [] Prone          []Supine                       []Intermittent   []Continuous Lbs:  [] before manual  [] after manual    []  Ultrasound: []Continuous   [] Pulsed                           []1MHz   []3MHz Location:  W/cm2:        10 []  Ice     [x]  heat  []  Ice massage Position: sitting with LE propped  Location: B feet   10 [x]  Vasopneumatic Device Pressure: [x] lo [] med [] hi   Temp: [x] lo [] med [] hi   [x] Skin assessment post-treatment:  [x]intact []redness- no adverse reaction       []redness  adverse reaction:       46 min Therapeutic Exercise:  [x] See flow sheet :   Rationale: increase ROM, increase strength, improve coordination, improve balance and increase proprioception to improve the patients ability to maximize pain-free daily activity          With TE Patient Education: [x] Review HEP    [] Progressed/Changed HEP based on:   [] positioning   [] body mechanics   [] transfers   [] heat/ice application          Pain Level (0-10 scale) post treatment: 2/10    ASSESSMENT/Changes in Function: Session initiated with MHP followed by active warm up on stationary bike, no complaints. Continued to work on standing strength, stability & proprioception including maximizing control in sagittal plane. Calf raises progressed from level to incline positioning, BOSU step on/offs progressed to include frontal plane loading, resistance increased with side stepping & clams, height increased with static stance and steam boats introduced. Pt provided with demonstration and verbal cues with new activity to promote proper set-up & safety. Plan to further progress standing balance & stability next visit. Patient will continue to benefit from skilled PT services to modify and progress therapeutic interventions, address functional mobility deficits, address ROM deficits, address strength deficits, analyze and address soft tissue restrictions, analyze and cue movement patterns, analyze and modify body mechanics/ergonomics and assess and modify postural abnormalities to attain remaining goals.      [x]  See Plan of Care  []  See progress note/recertification  []  See Discharge Summary             PLAN  [x]  Upgrade activities as tolerated     [x]  Continue plan of care  []  Update interventions per flow sheet       []  Discharge due to:_  []  Other:_      Clois Goodpasture, PT, DPT 6/14/2021  9:27 AM

## 2021-06-17 ENCOUNTER — HOSPITAL ENCOUNTER (OUTPATIENT)
Dept: PHYSICAL THERAPY | Age: 61
Discharge: HOME | End: 2021-06-17
Payer: OTHER GOVERNMENT

## 2021-06-17 PROCEDURE — 97016 VASOPNEUMATIC DEVICE THERAPY: CPT

## 2021-06-17 PROCEDURE — 97110 THERAPEUTIC EXERCISES: CPT

## 2021-06-17 NOTE — PROGRESS NOTES
PT DAILY TREATMENT NOTE 814    Patient Name: Mayra Benjamin  Date:2021  : 1960  [x]  Patient  Verified  Payor: VITALY / Plan: Jerel Russell 74 / Product Type: Maribell Llanos /    In time:930  Out time:104  Total Treatment Time (min): 71  Total Timed Codes (min): 51  1:1 Treatment Time (min): 51   Visit # 14       Treatment Area: Pain in left ankle and joints of left foot [M25.572]    SUBJECTIVE  Pt denies complaints upon arrival today, states has been wearing ankle brace more when outdoors.   Pain Level (0-10 scale): 0/10  Any medication changes, allergies to medications, adverse drug reactions, diagnosis change, or new procedure performed?: [x] No    [] Yes (see summary sheet for update)        OBJECTIVE  Modality rationale: decrease inflammation, decrease pain and increase tissue extensibility to improve the patients ability to move/heal optimally   Min Type Additional Details    [] Estim: []Att   []Unatt  []TENS instruct                 []IFC  []Premod []NMES                       []Other:  []w/US   []w/ice   []w/heat  Position:  Location:    []  Traction: [] Cervical       []Lumbar                       [] Prone          []Supine                       []Intermittent   []Continuous Lbs:  [] before manual  [] after manual    []  Ultrasound: []Continuous   [] Pulsed                           []1MHz   []3MHz Location:  W/cm2:        10 []  Ice     [x]  heat  []  Ice massage Position : sitting w/ LE propped  Location: R foot/ankle   10 [x]  Vasopneumatic Device Pressure: [x] lo [] med [] hi   Temp: [x] lo [] med [] hi       51 min Therapeutic Exercise:  [x] See flow sheet :   Rationale: increase ROM, increase strength, improve coordination, improve balance and increase proprioception to improve the patients ability to maximize pain-free daily activity, promote endurance, safety & confidence with mobility              With TE Patient Education: [x] Review HEP    [] Progressed/Changed HEP based on:   [] positioning   [] body mechanics   [] transfers   [] heat/ice application          Pain Level (0-10 scale) post treatment: 0/10    ASSESSMENT/Changes in Function: Session initiated with MHP to R foot/ankle followed by continued focus on unilateral stability, proprioception, balance and strengthening. Captain laureano stance progressed to include dynadisc under lower foot and 8 inch box on high foot, pt able to maintain without LOB, increased ankle strategy observed. LE slides introduced to further address ankle mobility & stability, no pain reported. Patient will continue to benefit from skilled PT services to modify and progress therapeutic interventions, address functional mobility deficits, address ROM deficits, address strength deficits, analyze and address soft tissue restrictions, analyze and cue movement patterns and analyze and modify body mechanics/ergonomics to attain remaining goals.      [x]  See Plan of Care  []  See progress note/recertification  []  See Discharge Summary             PLAN  [x]  Upgrade activities as tolerated     [x]  Continue plan of care  []  Update interventions per flow sheet       []  Discharge due to:_  []  Other:_      Brock Escamilla, PT, DPT 6/17/2021  9:29 AM

## 2021-06-21 ENCOUNTER — HOSPITAL ENCOUNTER (OUTPATIENT)
Dept: PHYSICAL THERAPY | Age: 61
End: 2021-06-21
Payer: OTHER GOVERNMENT

## 2021-06-23 ENCOUNTER — APPOINTMENT (OUTPATIENT)
Dept: PHYSICAL THERAPY | Age: 61
End: 2021-06-23
Payer: OTHER GOVERNMENT

## 2021-06-28 ENCOUNTER — HOSPITAL ENCOUNTER (OUTPATIENT)
Dept: PHYSICAL THERAPY | Age: 61
Discharge: HOME | End: 2021-06-28
Payer: OTHER GOVERNMENT

## 2021-06-28 PROCEDURE — 97110 THERAPEUTIC EXERCISES: CPT

## 2021-06-28 PROCEDURE — 97016 VASOPNEUMATIC DEVICE THERAPY: CPT

## 2021-06-28 PROCEDURE — 97530 THERAPEUTIC ACTIVITIES: CPT

## 2021-06-28 NOTE — PROGRESS NOTES
PT DAILY TREATMENT NOTE     Patient Name: Gloria Romero  Date:2021  : 1960  [x]  Patient  Verified  Payor: VITALY / Plan: Jerel Russell 74 / Product Type: Esperanza Fernandez /    In EVLS:[id number]  Out time:1035  Total Treatment Time (min): 70  Total Timed Codes (min): 60  1:1 Treatment Time (min): 50   Visit #: 15    Treatment Area: Pain in left ankle and joints of left foot [M25.572]    SUBJECTIVE  Pt stated she has been walking more and has increased pain and soreness in R ankle. Pt has a follow up appointment with surgeon tomorrow . Pain Level (0-10 scale): 5    Any medication changes, allergies to medications, adverse drug reactions, diagnosis change, or new procedure performed?: [x] No    [] Yes (see summary sheet for update)    OBJECTIVE  Modality rationale: decrease inflammation, decrease pain and increase tissue extensibility to improve the patients ability to optimally heal and participate in physical therapy.     Min Type Additional Details    [] Estim: []Att   []Unatt  []TENS instruct                 []IFC  []Premod []NMES                       []Other:  []w/US   []w/ice   []w/heat  Position:  Location:    []  Traction: [] Cervical       []Lumbar                       [] Prone          []Supine                       []Intermittent   []Continuous Lbs:  [] before manual  [] after manual    []  Ultrasound: []Continuous   [] Pulsed                           []1MHz   []3MHz Location:  W/cm2:   10 []  Ice     [x]  heat  []  Ice massage Position: seated with R LE propped  Location: R ankle   10 [x]  Vasopneumatic Device Pressure: [x] lo [] med [] hi   Temp: [] lo [x] med [] hi   [] Skin assessment post-treatment:  []intact []redness- no adverse reaction       []redness  adverse reaction:     35 min Therapeutic Exercise:  [x] See flow sheet :   Rationale: increase ROM, increase strength, improve coordination and improve balance to restore function and mobility allowing for pain free movement. 12 min Therapeutic Activity:  [x]  See flow sheet :   Rationale: To improve static and dynamic balance with functional activities. Pain Level (0-10 scale) post treatment: 2    ASSESSMENT/Changes in Function: Continued with POC working to improve B ankle strength, mobility, and stability. Session began with MHP followed by self stretches to hamstrings and gastroc. Exercises completed as listed with patient taking standing rest breaks as needed. Most difficulty with BOSU step ups due to L knee pain. Pt reported a decrease in pain upon completion of exercises. Patient will continue to benefit from skilled PT services to modify and progress therapeutic interventions, address functional mobility deficits, address ROM deficits, address strength deficits, analyze and address soft tissue restrictions, analyze and cue movement patterns, analyze and modify body mechanics/ergonomics, assess and modify postural abnormalities and address imbalance/dizziness to attain remaining goals.      [x]  See Plan of Care  []  See progress note/recertification  []  See Discharge Summary         PLAN  []  Upgrade activities as tolerated     [x]  Continue plan of care  []  Update interventions per flow sheet       []  Discharge due to:_  []  Other:_      CAS Ray  6/28/2021  10:19 AM

## 2021-06-29 ENCOUNTER — OFFICE VISIT (OUTPATIENT)
Dept: ORTHOPEDIC SURGERY | Age: 61
End: 2021-06-29
Payer: OTHER GOVERNMENT

## 2021-06-29 DIAGNOSIS — M25.572 LEFT ANKLE PAIN, UNSPECIFIED CHRONICITY: Primary | ICD-10-CM

## 2021-06-29 DIAGNOSIS — M25.571 RIGHT ANKLE PAIN, UNSPECIFIED CHRONICITY: ICD-10-CM

## 2021-06-29 PROCEDURE — 99213 OFFICE O/P EST LOW 20 MIN: CPT | Performed by: ORTHOPAEDIC SURGERY

## 2021-06-29 RX ORDER — METFORMIN HYDROCHLORIDE 500 MG/1
TABLET, EXTENDED RELEASE ORAL
COMMUNITY
Start: 2021-05-13

## 2021-06-29 NOTE — PATIENT INSTRUCTIONS
Ankle: Exercises  Introduction  Here are some examples of exercises for you to try. The exercises may be suggested for a condition or for rehabilitation. Start each exercise slowly. Ease off the exercises if you start to have pain. You will be told when to start these exercises and which ones will work best for you. How to do the exercises  'Alphabet' exercise   1. Trace the alphabet with your toe. This helps your ankle move in all directions. Side-to-side knee swing exercise   1. Sit in a chair with your foot flat on the floor. 2. Slowly move your knee from side to side while keeping your foot pressed flat. 3. Continue this exercise for 2 to 3 minutes. Towel curl   1. While sitting, place your foot on a towel on the floor and scrunch the towel toward you with your toes. 2. Then use your toes to push the towel away from you. 3. Make this exercise more challenging by placing a weighted object, such as a soup can, on the other end of the towel. Towel stretch   1. Sit with your legs extended and knees straight. 2. Place a towel around your foot just under the toes. 3. Hold each end of the towel in each hand, with your hands above your knees. 4. Pull back with the towel so that your foot stretches toward you. 5. Hold the position for at least 15 to 30 seconds. 6. Repeat 2 to 4 times a session, up to 5 sessions a day. Ankle eversion exercise   1. Start by sitting with your foot flat on the floor and pushing it outward against an immovable object such as the wall or heavy furniture. Hold for about 6 seconds, then relax. Repeat 8 to 12 times. 2. After you feel comfortable with this, try using rubber tubing looped around the outside of your feet for resistance. Push your foot out to the side against the tubing, and then count to 10 as you slowly bring your foot back to the middle. Repeat 8 to 12 times. Isometric opposition exercises   1.  While sitting, put your feet together flat on the floor.  2. Press your injured foot inward against your other foot. Hold for about 6 seconds, and relax. Repeat 8 to 12 times. 3. Then place the heel of your other foot on top of the injured one. Push down with the top heel while trying to push up with your injured foot. Hold for about 6 seconds, and relax. Repeat 8 to 12 times. Follow-up care is a key part of your treatment and safety. Be sure to make and go to all appointments, and call your doctor if you are having problems. It's also a good idea to know your test results and keep a list of the medicines you take. Where can you learn more? Go to http://www.gray.com/  Enter R730 in the search box to learn more about \"Ankle: Exercises. \"  Current as of: November 16, 2020               Content Version: 12.8  © 8637-2762 [employer], [employer]. Care instructions adapted under license by [employer] (which disclaims liability or warranty for this information). If you have questions about a medical condition or this instruction, always ask your healthcare professional. [address] any warranty or liability for your use of this information.

## 2021-06-29 NOTE — PROGRESS NOTES
Name: Toy Kennedy    : 1960     Service Dept: [address] [place] [address] and Sports Medicine    Patient's Pharmacies:    [facility], [address] [place] [address] [address] [address]  Phone: [phone] Fax: [phone]       Chief Complaint   Patient presents with    Ankle Pain        There were no vitals taken for this visit. No Known Allergies   Current Outpatient Medications   Medication Sig Dispense Refill    metFORMIN ER (GLUCOPHAGE XR) 500 mg tablet TAKE 1 TABLET DAILY      gabapentin (NEURONTIN) 100 mg capsule       omeprazole (PRILOSEC) 20 mg capsule       loratadine 10 mg cap Take 10 mg by mouth.  losartan (COZAAR) 50 mg tablet Take 50 mg by mouth daily.  hydroCHLOROthiazide (MICROZIDE) 12.5 mg capsule Take 12.5 mg by mouth daily.  multivitamin (ONE A DAY) tablet Take 1 Tab by mouth daily.  calcium-cholecalciferol, d3, (CALCIUM 600 + D) 600-125 mg-unit tab Take 600 mg by mouth.  ascorbic acid, vitamin C, (Vitamin C) 500 mg tablet Take 500 mg by mouth daily.  atorvastatin (LIPITOR) 20 mg tablet Take 20 mg by mouth daily.  buPROPion XL (WELLBUTRIN XL) 300 mg XL tablet Take 300 mg by mouth every morning.  escitalopram oxalate (LEXAPRO) 20 mg tablet Take 20 mg by mouth daily. Patient Active Problem List   Diagnosis Code    Ankle fracture, bimalleolar, closed, right, initial encounter S82.841A    Left tibial fracture S82.202A    Talus fracture S92.109A    Diabetes mellitus type 2, controlled ([facility] [address] E11.9    Essential hypertension I10    Hyperlipidemia, mixed E78.2    Chronic GERD K21.9    Depression, major F32.9    Acute right ankle pain M25.571    S/P surgical manipulation of ankle joint Z98.890      History reviewed. No pertinent family history.    Social History     Socioeconomic History    Marital status:      Spouse name: Not on file    Number of children: Not on file    Years of education: Not on file    Highest education level: Not on file   Tobacco Use    Smoking status: Never Smoker    Smokeless tobacco: Never Used   Substance and Sexual Activity    Alcohol use: Not Currently    Drug use: Not Currently     Social Determinants of Health     Financial Resource Strain:     Difficulty of Paying Living Expenses:    Food Insecurity:     Worried About Running Out of Food in the Last Year:     Ran Out of Food in the Last Year:    Transportation Needs:     Lack of Transportation (Medical):  Lack of Transportation (Non-Medical):    Physical Activity:     Days of Exercise per Week:     Minutes of Exercise per Session:    Stress:     Feeling of Stress :    Social Connections:     Frequency of Communication with Friends and Family:     Frequency of Social Gatherings with Friends and Family:     Attends [employer] Services:     Active Member of Clubs or Organizations:     Attends Club or Organization Meetings:     Marital Status:       Past Surgical History:   Procedure Laterality Date    HX CERVICAL FUSION        Past Medical History:   Diagnosis Date    Diabetes ([address]     GERD (gastroesophageal reflux disease)     Hyperlipemia     Hypertension     Psychiatric disorder         I have reviewed and agree with [address] [place] [address] Nw and ROS and intake form in chart and the record furthermore I have reviewed prior medical record(s) regarding this patients care during this appointment. Review of Systems:   Patient is a pleasant appearing individual, appropriately dressed, well hydrated, well nourished, who is alert, appropriately oriented for age, and in no acute distress with a normal gait and normal affect who does not appear to be in any significant pain.    Physical Exam:  Left Ankle-Point tenderness to palpation lateral aspect on ankle, Decreased range of motion with flexion and extension, No gross instability, Weakness with plantar flexion, No skin abrasions, Positive for swelling, Grossly neurovascularly intact. Right Ankle-Point tenderness to palpation lateral aspect on ankle, Decreased range of motion with flexion and extension, No gross instability, Weakness with plantar flexion, No skin abrasions, Positive for swelling, Grossly neurovascularly intact. Encounter Diagnoses     ICD-10-CM ICD-9-CM   1. Left ankle pain, unspecified chronicity  M25.572 719.47       HPI:  The patient is status post bilateral ankle fracture treatment and the right bimalleolar ORIF. Doing well. Has no complaints. Little bit of soreness. Pain is 3/10. Assessment/Plan:  Plan at this point, activities as tolerated, weightbearing started, no restrictions. For the ankles, she does have some arthritic changes which is to be expected. We will see her back as needed. If she gets worse, she is to give me a call. No restrictions in the meantime. As part of continued conservative pain management options the patient was advised to utilize Tylenol or OTC NSAIDS as long as it is not medically contraindicated. Return to Office: Follow-up and Dispositions    · Return if symptoms worsen or fail to improve. Scribed by Marilynn Meza LPN as dictated by [facility] NUZHAT Montelongo MD.  Documentation True and Accepted Chad Montelongo MD

## 2021-07-01 ENCOUNTER — HOSPITAL ENCOUNTER (OUTPATIENT)
Dept: PHYSICAL THERAPY | Age: 61
Discharge: HOME | End: 2021-07-01
Payer: OTHER GOVERNMENT

## 2021-07-01 PROCEDURE — 97016 VASOPNEUMATIC DEVICE THERAPY: CPT

## 2021-07-01 PROCEDURE — 97110 THERAPEUTIC EXERCISES: CPT

## 2021-07-01 NOTE — PROGRESS NOTES
PT DAILY TREATMENT NOTE 8-14    Patient Name: Duana Nissen  Date:2021  : 1960  [x]  Patient  Verified  Payor:  / Plan: Jerel Russell 74 / Product Type:  /    In MKNM:[id number]  Out time:1032  Total Treatment Time (min): 63  Total Timed Codes (min): 53  1:1 Treatment Time (min): 53   Visit # 16      Treatment Area: Pain in left ankle and joints of left foot [M25.572]    SUBJECTIVE  Pt states that she's had a good past few days.   Pain Level (0-10 scale): 2/10  Any medication changes, allergies to medications, adverse drug reactions, diagnosis change, or new procedure performed?: [x] No    [] Yes (see summary sheet for update)        OBJECTIVE  Modality rationale: decrease inflammation and decrease pain to improve the patients ability to move/heal optimally   Min Type Additional Details    [] Estim: []Att   []Unatt  []TENS instruct                 []IFC  []Premod []NMES                       []Other:  []w/US   []w/ice   []w/heat  Position:  Location:    []  Traction: [] Cervical       []Lumbar                       [] Prone          []Supine                       []Intermittent   []Continuous Lbs:  [] before manual  [] after manual    []  Ultrasound: []Continuous   [] Pulsed                           []1MHz   []3MHz Location:  W/cm2:         []  Ice     []  heat  []  Ice massage Position:  Location:   10 [x]  Vasopneumatic Device Pressure: [x] lo [] med [] hi   Temp: [x] lo [] med [] hi       53 min Therapeutic Exercise:  [x] See flow sheet :   Rationale: increase ROM, increase strength, improve coordination, improve balance and increase proprioception to improve the patients ability to maximize safety and endurance with mobility, confidence on unlevel surfaces            With TE Patient Education: [x] Review HEP    [] Progressed/Changed HEP based on:   [] positioning   [] body mechanics   [] transfers   [x] ice application          Pain Level (0-10 scale) post treatment: 2/10    ASSESSMENT/Changes in Function: Session initiated on stationary bike for active warm up and to promote ankle mobility, no complaints. Standing balance challenged on unlevel surfaces via standing on BOSU ball with ball toss challenge to promote forward gaze and have external focus of control. Intermittent UE support to maintain balance within // bars. Leg press introduced to promote CKC strength, ankle mobility. Ball squeeze utilized to aid in equalizing weight bearing and knee stability. No pain with any activities today. Pt demonstrates improved confidence with step negotiation today including reciprocal descent. Pt encouraged to perform banded hip PRE over weekend. Patient will continue to benefit from skilled PT services to modify and progress therapeutic interventions, address functional mobility deficits, address ROM deficits, address strength deficits, analyze and address soft tissue restrictions, analyze and cue movement patterns and analyze and modify body mechanics/ergonomics to attain remaining goals.      [x]  See Plan of Care  []  See progress note/recertification  []  See Discharge Summary             PLAN  []  Upgrade activities as tolerated     [x]  Continue plan of care  []  Update interventions per flow sheet       []  Discharge due to:_  []  Other:_      Sukhdeep Loving, PT, DPT 7/1/2021  9:27 AM

## 2021-07-06 ENCOUNTER — HOSPITAL ENCOUNTER (OUTPATIENT)
Dept: PHYSICAL THERAPY | Age: 61
Discharge: HOME | End: 2021-07-06
Payer: OTHER GOVERNMENT

## 2021-07-06 PROCEDURE — 97016 VASOPNEUMATIC DEVICE THERAPY: CPT

## 2021-07-06 PROCEDURE — 97110 THERAPEUTIC EXERCISES: CPT

## 2021-07-06 NOTE — PROGRESS NOTES
PT DAILY TREATMENT NOTE 8    Patient Name: Sixto Newell  Date:2021  : 1960  [x]  Patient  Verified  Payor:  / Plan: Jerel Russell 74 / Product Type:  /    In time:930  Out time:103  Total Treatment Time (min): 61  Total Timed Codes (min): 51  1:1 Treatment Time (min): 51   Visit # 17      Treatment Area: Pain in left ankle and joints of left foot [M25.572]    SUBJECTIVE  Pt reports having some soreness after last session last week, arrives with pain-free left and minimal right. Pt states she has good days and bad days now in regards to pain/ache.   Pain Level (0-10 scale): 1/10 right foot/ankle  Any medication changes, allergies to medications, adverse drug reactions, diagnosis change, or new procedure performed?: [x] No    [] Yes (see summary sheet for update)        OBJECTIVE  Modality rationale: decrease inflammation and decrease pain to improve the patients ability to move/heal optimally    Min Type Additional Details    [] Estim: []Att   []Unatt  []TENS instruct                 []IFC  []Premod []NMES                       []Other:  []w/US   []w/ice   []w/heat  Position:  Location:    []  Traction: [] Cervical       []Lumbar                       [] Prone          []Supine                       []Intermittent   []Continuous Lbs:  [] before manual  [] after manual    []  Ultrasound: []Continuous   [] Pulsed                           []1MHz   []3MHz Location:  W/cm2:         []  Ice     []  heat  []  Ice massage Position:  Location:   10 [x]  Vasopneumatic Device Pressure: [x] lo [] med [] hi   Temp: [x] lo [] med [] hi         51 min Therapeutic Exercise:  [x] See flow sheet :   Rationale: increase ROM, increase strength, improve coordination, improve balance and increase proprioception to improve the patients ability to restore PLOF on level and unlevel surfaces, restore confidence in upright balance             With TE Patient Education: [x] Review HEP    [] Progressed/Changed HEP based on:   [] positioning   [x] body mechanics   [] transfers   [] heat/ice application          Pain Level (0-10 scale) post treatment: 1/10    ASSESSMENT/Changes in Function: Session initiated on stationary bike followed by continued focus on standing balance, proximal stabilization and strengthening. Challenges today included dynamic balance on unlevel surface with ball toss, gerald steps obstacle course in both forwards and lateral directions. Pt able to complete without LOB. Will continue to address balance , proprioception and strength next visit to promote optimal independence as pt states she'd like to be able to climb ladder to perform household/yard chores. Patient will continue to benefit from skilled PT services to modify and progress therapeutic interventions, address functional mobility deficits, address ROM deficits, address strength deficits, analyze and cue movement patterns and analyze and modify body mechanics/ergonomics to attain remaining goals.      [x]  See Plan of Care  []  See progress note/recertification  []  See Discharge Summary             PLAN  [x]  Upgrade activities as tolerated     [x]  Continue plan of care  []  Update interventions per flow sheet       []  Discharge due to:_  []  Other:_      Michelle Ramsay, PT, DPT 7/6/2021  9:33 AM

## 2021-07-09 ENCOUNTER — APPOINTMENT (OUTPATIENT)
Dept: PHYSICAL THERAPY | Age: 61
End: 2021-07-09
Payer: OTHER GOVERNMENT

## 2021-07-13 ENCOUNTER — OFFICE VISIT (OUTPATIENT)
Dept: ORTHOPEDIC SURGERY | Age: 61
End: 2021-07-13
Payer: OTHER GOVERNMENT

## 2021-07-13 DIAGNOSIS — M17.12 OSTEOARTHRITIS OF LEFT KNEE, UNSPECIFIED OSTEOARTHRITIS TYPE: Primary | ICD-10-CM

## 2021-07-13 PROCEDURE — 99213 OFFICE O/P EST LOW 20 MIN: CPT | Performed by: ORTHOPAEDIC SURGERY

## 2021-07-13 RX ORDER — LORATADINE 10 MG/1
TABLET ORAL
COMMUNITY
Start: 2021-05-25

## 2021-07-13 NOTE — PROGRESS NOTES
Name: Alexandra Jones    : 1960     Service Dept: [facility]    Patient's Pharmacies:    [facility] [address], [address] [place] [id number]  Phone: [phone] Fax: [phone]       Chief Complaint   Patient presents with    Knee Pain        There were no vitals taken for this visit. No Known Allergies   Current Outpatient Medications   Medication Sig Dispense Refill    loratadine (CLARITIN) 10 mg tablet       metFORMIN ER (GLUCOPHAGE XR) 500 mg tablet TAKE 1 TABLET DAILY      gabapentin (NEURONTIN) 100 mg capsule       omeprazole (PRILOSEC) 20 mg capsule       losartan (COZAAR) 50 mg tablet Take 50 mg by mouth daily.  hydroCHLOROthiazide (MICROZIDE) 12.5 mg capsule Take 12.5 mg by mouth daily.  multivitamin (ONE A DAY) tablet Take 1 Tab by mouth daily.  calcium-cholecalciferol, d3, (CALCIUM 600 + D) 600-125 mg-unit tab Take 600 mg by mouth.  ascorbic acid, vitamin C, (Vitamin C) 500 mg tablet Take 500 mg by mouth daily.  atorvastatin (LIPITOR) 20 mg tablet Take 20 mg by mouth daily.  buPROPion XL (WELLBUTRIN XL) 300 mg XL tablet Take 300 mg by mouth every morning.  escitalopram oxalate (LEXAPRO) 20 mg tablet Take 20 mg by mouth daily. Patient Active Problem List   Diagnosis Code    Ankle fracture, bimalleolar, closed, right, initial encounter S82.841A    Left tibial fracture S82.202A    Talus fracture S92.109A    Diabetes mellitus type 2, controlled ([facility] [address] E11.9    Essential hypertension I10    Hyperlipidemia, mixed E78.2    Chronic GERD K21.9    Depression, major F32.9    Acute right ankle pain M25.571    S/P surgical manipulation of ankle joint Z98.890      History reviewed. No pertinent family history.    Social History     Socioeconomic History    Marital status:      Spouse name: Not on file    Number of children: Not on file    Years of education: Not on file    Highest education level: Not on file   Tobacco Use    Smoking status: Never Smoker    Smokeless tobacco: Never Used   Substance and Sexual Activity    Alcohol use: Not Currently    Drug use: Not Currently     Social Determinants of Health     Financial Resource Strain:     Difficulty of Paying Living Expenses:    Food Insecurity:     Worried About Running Out of Food in the Last Year:     Ran Out of Food in the Last Year:    Transportation Needs:     Lack of Transportation (Medical):  Lack of Transportation (Non-Medical):    Physical Activity:     Days of Exercise per Week:     Minutes of Exercise per Session:    Stress:     Feeling of Stress :    Social Connections:     Frequency of Communication with Friends and Family:     Frequency of Social Gatherings with Friends and Family:     Attends Synagogue Services:     Active Member of Clubs or Organizations:     Attends Club or Organization Meetings:     Marital Status:       Past Surgical History:   Procedure Laterality Date    HX CERVICAL FUSION        Past Medical History:   Diagnosis Date    Diabetes ([address]     GERD (gastroesophageal reflux disease)     Hyperlipemia     Hypertension     Psychiatric disorder         I have reviewed and agree with [address] [place] [address] Nw and ROS and intake form in chart and the record furthermore I have reviewed prior medical record(s) regarding this patients care during this appointment. Review of Systems:   Patient is a pleasant appearing individual, appropriately dressed, well hydrated, well nourished, who is alert, appropriately oriented for age, and in no acute distress with a normal gait and normal affect who does not appear to be in any significant pain.    Physical Exam:  Left Knee -Decrease range of motion with flexion, Knee arc of greater than 50 degrees, Some crepitation, Grossly neurovascularly intact, Good cap refill, No skin lesion, Moderate swelling, No gross instability, Some quadriceps weakness, greater than 50 degree arc    Right Knee - Full Range of Motion, No crepitation, Grossly neurovascularly intact, Good cap refill, No skin lesion, No swelling, No gross instability, No quadriceps weakness   Encounter Diagnoses     ICD-10-CM ICD-9-CM   1. Osteoarthritis of left knee, unspecified osteoarthritis type  M17.12 715.96       HPI:  The patient is here with a chief complaint of left knee pain, dull, throbbing pain. Pain is 1/10. X-rays of the left knee are unremarkable. Assessment/Plan:  1. Left knee mild arthritic flare. Plan at this point, activities as tolerated started, no restrictions. We will see the patient back as needed and go from there. As part of continued conservative pain management options the patient was advised to utilize Tylenol or OTC NSAIDS as long as it is not medically contraindicated. Return to Office: Follow-up and Dispositions    · Return if symptoms worsen or fail to improve. Scribed by Katy Ormond, LPN as dictated by [facility] Buckner NUZHAT Boston MD.  Documentation True and Accepted Chad Boston MD

## 2021-07-13 NOTE — PATIENT INSTRUCTIONS
Knee Pain or Injury: Care Instructions  Your Care Instructions     Injuries are a common cause of knee problems. Sudden (acute) injuries may be caused by a direct blow to the knee. They can also be caused by abnormal twisting, bending, or falling on the knee. Pain, bruising, or swelling may be severe, and may start within minutes of the injury. Overuse is another cause of knee pain. Other causes are climbing stairs, kneeling, and other activities that use the knee. Everyday wear and tear, especially as you get older, also can cause knee pain. Rest, along with home treatment, often relieves pain and allows your knee to heal. If you have a serious knee injury, you may need tests and treatment. Follow-up care is a key part of your treatment and safety. Be sure to make and go to all appointments, and call your doctor if you are having problems. It's also a good idea to know your test results and keep a list of the medicines you take. How can you care for yourself at home? · Be safe with medicines. Read and follow all instructions on the label. ? If the doctor gave you a prescription medicine for pain, take it as prescribed. ? If you are not taking a prescription pain medicine, ask your doctor if you can take an over-the-counter medicine. · Rest and protect your knee. Take a break from any activity that may cause pain. · Put ice or a cold pack on your knee for 10 to 20 minutes at a time. Put a thin cloth between the ice and your skin. · Prop up a sore knee on a pillow when you ice it or anytime you sit or lie down for the next 3 days. Try to keep it above the level of your heart. This will help reduce swelling. · If your knee is not swollen, you can put moist heat, a heating pad, or a warm cloth on your knee. · If your doctor recommends an elastic bandage, sleeve, or other type of support for your knee, wear it as directed.   · Follow your doctor's instructions about how much weight you can put on your leg. Use a cane, crutches, or a walker as instructed. · Follow your doctor's instructions about activity during your healing process. If you can do mild exercise, slowly increase your activity. · Reach and stay at a healthy weight. Extra weight can strain the joints, especially the knees and hips, and make the pain worse. Losing even a few pounds may help. When should you call for help? Call 911 anytime you think you may need emergency care. For example, call if:    · You have symptoms of a blood clot in your lung (called a pulmonary embolism). These may include:  ? Sudden chest pain. ? Trouble breathing. ? Coughing up blood. Call your doctor now or seek immediate medical care if:    · You have severe or increasing pain.     · Your leg or foot turns cold or changes color.     · You cannot stand or put weight on your knee.     · Your knee looks twisted or bent out of shape.     · You cannot move your knee.     · You have signs of infection, such as:  ? Increased pain, swelling, warmth, or redness. ? Red streaks leading from the knee. ? Pus draining from a place on your knee. ? A fever.     · You have signs of a blood clot in your leg (called a deep vein thrombosis), such as:  ? Pain in your calf, back of the knee, thigh, or groin. ? Redness and swelling in your leg or groin. Watch closely for changes in your health, and be sure to contact your doctor if:    · You have tingling, weakness, or numbness in your knee.     · You have any new symptoms, such as swelling.     · You have bruises from a knee injury that last longer than 2 weeks.     · You do not get better as expected. Where can you learn more? Go to http://www.gray.com/  Enter K195 in the search box to learn more about \"Knee Pain or Injury: Care Instructions. \"  Current as of: February 26, 2020               Content Version: 12.8  © 0575-0739 [employer].    Care instructions adapted under license by [employer] (which disclaims liability or warranty for this information). If you have questions about a medical condition or this instruction, always ask your healthcare professional. [address] any warranty or liability for your use of this information.

## 2021-07-16 ENCOUNTER — APPOINTMENT (OUTPATIENT)
Dept: PHYSICAL THERAPY | Age: 61
End: 2021-07-16
Payer: OTHER GOVERNMENT

## 2021-07-19 ENCOUNTER — APPOINTMENT (OUTPATIENT)
Dept: PHYSICAL THERAPY | Age: 61
End: 2021-07-19
Payer: OTHER GOVERNMENT

## 2021-08-11 NOTE — PROGRESS NOTES
[facility]  [address] [place] [address] Dr SHUKLA, [address] [facility], [address] * Phone: [phone] * Fax: [phone]  DISCHARGE SUMMARY FOR PHYSICAL THERAPY            Patient Name: Jose Wood : 1960   Treatment/Medical Diagnosis: Pain in left ankle and joints of left foot [M25.572]   Onset Date: 2021    Referral Source: Fatoumata Zamora MD Start of [facility]): 2021   Prior Hospitalization: See Medical History Provider #: [id number]   Prior Level of Function: Independent   Comorbidities: Diabetes, Hyperlipidemia, HTN   Medications: Verified on Patient Summary List   [facility]: 17 Missed Visits: 0       Subjective:  Pt reports having some soreness after last session last week, arrives with pain-free left and minimal right. Pt states she has good days and bad days now in regards to pain/ache. ( per last attended session on 2021)    Objective Measures:     ROM / Strength  []? ?? Unable to assess                AROM                         PROM                   Strength       Left Right Left Right Left Right   Hip Flexion         5/5 5/5     Extension                 Abduction         5/5 5/5     Adduction         5/5 5/5   Knee Flexion         4+/5 4/5     Extension         4+/5 4/5   Ankle Plantarflexion 45 deg  35 deg   40 deg 4/5 4/5     Dorsiflexion  Inversion  Eversion 15 deg  28 deg  25 deg 10 deg  30 deg  28 deg    35 deg  25 deg 15 deg  32 deg  30 deg 4+/5  4+/5  4+/5 4+/5  4/5  3/5         Girth Measurements:               Inframalleolar                         Left 26.5 cm        Right 26 cm                                 Gait:                []? ?? Normal    []??? Abnormal    [x]? ?? Antalgic    []? ?? NWB                             Distance: 450 feet on level surfaces without AD nor ankle brace, asymmetrical stance times,                                      decreased push off     LEFS: 62/80 (33 point improvement from initial)  TU seconds without AD (20 second faster than initial measure)     Short Term Goals:  1. Patient will report the knowledge of 3 exercises that can be used to help reduce symptoms to be able to ind reduce symptoms while at home. MET. 2. Patient will demonstrate a 1/2 grade improvement in B ankle MMT to be able to better ambulate with a normalized gait without pain. MET, as listed above. 3. Patient will demonstrate R ankle DF PROM of > 15  deg to facilitate increased ability to ascend/descend stairs. MET, as listed above. 4. Patient will increase LEFS > 10 points to facilitate increased ability to perform functional activities of choice. MET, as listed above.     Long Term Goals:  1. Patient will demonstrate a 2 grade improvement in B ankle MMT to be able to better ambulate with a normalized gait without pain. Progressing, as listed above. 2. Patient will demonstrate B ankle DF AROM of 15 deg or greater to be able to return to normal ambulation on level and unlevel surfaces. Progressing, as listed above. 3. Patient will demonstrate the ability to ambulate > 500 feet without an AD without evidence of antalgia to be able to return to an ind walking program following discharge. Progressing, as listed above. 4. Patient will increase LEFS > 20 points to facilitate increased ability to perform leisure activities of choice. MET, as listed above. Key Functional Changes/Progress: Above status per reassessment on 6/3/2021. Assessments/Recommendations: Discontinue therapy. Progressing towards or have reached established goals. Pt called clinic to state that she was doing well and did not wish to return, pt expressed gratitude. If you have any questions/comments please contact us directly at [phone]. Thank you for allowing us to assist in the care of your patient.     Therapist Signature: Dahiana Barone PT, DPT Date: 8/11/2021   Reporting Period: 04/20/2021 - 07/06/2021 Time: 3:16 PM      Certification Period: 04/20/2021 - 08/15/2021       NOTE TO PHYSICIAN:  PLEASE COMPLETE THE ORDERS BELOW AND FAX TO   [facility]: [phone]. If you are unable to process this request in 24 hours please contact our office: [phone].    ___ I have read the above report and request that my patient be discharged from therapy.      Physician Signature:        Date:       Time:

## 2021-12-21 NOTE — PROGRESS NOTES
Referring Physician:  None     Chief Complaint: Diarrhea    Date of service: 12/22/2021    Subjective:     History of Present Illness:  Patient is a female [address] [facility] 64 y.o.  who is seen for evaluation for diarrhea. The patient has GI complaints of diarrhea for several months. 10/2021 had an episode of diarrhea associated with BRBPR. She had diarrhea almost all night long, and the bleeding started the next day. Since then, she describes her bowel movements as follows- hard to have a BM, associated with tenesmus and incomplete evacuation. She reports she has 2 BM a week on average, and when they occur they are loose. She is presently taking fiber gummies since 10/2021, 2 po daily. No real change. Patient has type 2 diabetes mellitus and does take Metformin ER. She also takes omeprazole daily for reflux. The patient denies nausea, vomiting, fever, chills, abdominal pain, reflux, dysphagia, weight loss, or melena. Last EGD-never. Last colonoscopy - 3/2017- normal colon but sub-optimal prep, so repeat colonoscopy recommended in 5 years. Family history for GI disease is significant for no known GI disease. The patient reports liver related risk factors including risks for fatty liver including obesity and type 2 diabetes mellitus. Past medical history is significant for type 2 diabetes mellitus, esophageal reflux disease, hypertension, hyperlipidemia, repair of right ankle fracture within the last 6 months and depression. PMH:  Past Medical History:   Diagnosis Date    Diabetes ([address]     GERD (gastroesophageal reflux disease)     Hyperlipemia     Hypertension     Psychiatric disorder         PSH:  Past Surgical History:   Procedure Laterality Date    HX CERVICAL FUSION          Allergies:  No Known Allergies     Home Medications:  Cannot display prior to admission medications because the patient has not been admitted in this contact.         Hospital Medications:  Current Outpatient Medications   Medication Sig    loratadine (CLARITIN) 10 mg tablet     metFORMIN ER (GLUCOPHAGE XR) 500 mg tablet TAKE 1 TABLET DAILY    gabapentin (NEURONTIN) 100 mg capsule     omeprazole (PRILOSEC) 20 mg capsule     losartan (COZAAR) 50 mg tablet Take 50 mg by mouth daily.  hydroCHLOROthiazide (MICROZIDE) 12.5 mg capsule Take 12.5 mg by mouth daily.  multivitamin (ONE A DAY) tablet Take 1 Tab by mouth daily.  calcium-cholecalciferol, d3, (CALCIUM 600 + D) 600-125 mg-unit tab Take 600 mg by mouth.  ascorbic acid, vitamin C, (Vitamin C) 500 mg tablet Take 500 mg by mouth daily.  atorvastatin (LIPITOR) 20 mg tablet Take 20 mg by mouth daily.  buPROPion XL (WELLBUTRIN XL) 300 mg XL tablet Take 300 mg by mouth every morning.  escitalopram oxalate (LEXAPRO) 20 mg tablet Take 20 mg by mouth daily. No current facility-administered medications for this visit. Social History:  Social History     Tobacco Use    Smoking status: Never Smoker    Smokeless tobacco: Never Used   Substance Use Topics    Alcohol use: Not Currently        Pt denies any history of IV drug use, blood transfusions. Family History:  History reviewed. No pertinent family history. Review of Systems:  A detailed 10 system ROS is obtained, with pertinent positives as listed above. All others are negative unless listed in history above. Constitutional denies fever chills, headache, or weight loss. Skin- denies lesions or rashes. HEENT- denies any vision or hearing problems, epistaxis, sore throat, or dental problems. Lungs- no shortness of breath or chest pain reported, no dyspnea on exertion. Cardiac- no palpitations or chest pain reported, including at rest or on exertion. GI-no abdominal pain, melena, rectal bleeding, reflux, dysphagia, jaundice, change in stool or urine color, constipation or diarrhea. Genitourinary -no dysuria or hematuria.    Musculoskeletal-no muscle weakness or disuse or atrophy. Neurologic-no numbness, tingling, gait disturbance, or other abnormalities. Rheumatologic- patient denies any immune or rheumatologic diseases or symptoms. Endocrine- patient denies any endocrine abnormalities including thyroid disease or diabetes. Psychologic-patient denies depression, anxiety or emotional issues. No reported memory issues. Objective:     Physical Exam:  Vitals: /86 (BP 1 Location: Left arm, BP Patient Position: Sitting)   Pulse 92   Wt 108.9 kg (240 lb)   BMI 32.55 kg/m²    Gen:  Pt is alert, cooperative, no acute distress  Skin:  Extremities and face reveal no rashes. No meng erythema. No telangiectasias on the chest wall. HEENT: Sclerae anicteric. Extra-occular muscles are intact. No oral ulcers. No abnormal pigmentation of the lips. The neck is supple. Cardiovascular: Regular rate and rhythm. No murmurs, gallops, or rubs. Respiratory:  Comfortable breathing with no accessory muscle use. Clear breath sounds anteriorly with no wheezes, rales, or rhonchi. GI:  Abdomen nondistended, soft, and nontender. Normal active bowel sounds. No enlargement of the liver or spleen. No masses palpable. Rectal:  Deferred  Musculoskeletal:   No costovertebral tenderness. No localized muscle weakness, or decreased range of motion. Extremities:  No palpable cords or pitting edema of the lower legs. Extremities have good range of motion. Neurological:  Gross memory appears intact. Patient is alert and oriented. Psychiatric:  Mood appears appropriate with judgement intact. Lymphatic:  No cervical or supraclavicular adenopathy.     Laboratory:        Lab Results   Component Value Date     02/26/2021    K 3.9 02/26/2021     02/26/2021    CO2 24 02/26/2021    AGAP 8 02/26/2021     (H) 02/26/2021    BUN 19 02/26/2021    CREA 1.20 02/26/2021    BUCR 16 02/26/2021    GFRAA 55 02/26/2021    GFRNA 46 02/26/2021    CA 8.2 (L) 02/26/2021    WBC 9.9 02/26/2021    RBC 3.77 (L) 02/26/2021    HGB 10.9 (L) 02/26/2021    HCT 34.1 (L) 02/26/2021    MCV 90.5 02/26/2021    MCH 28.9 02/26/2021    MCHC 32.0 02/26/2021    RDW 13.8 02/26/2021     02/26/2021    MPLV 9.6 02/26/2021    GRANS 88 (H) 02/26/2021    LYMPH 10 (L) 02/26/2021    MONOS 2 (L) 02/26/2021    EOS 0 02/26/2021    BASOS 0 02/26/2021    IG 0 02/26/2021    ANEU 8.7 (H) 02/26/2021    ABL 1.0 02/26/2021    ABM 0.2 02/26/2021    RIGOBERTO 0.0 02/26/2021    ABB 0.0 02/26/2021    AIG 0.0 02/26/2021   results  Lab Results   Component Value Date     02/26/2021    K 3.9 02/26/2021     02/26/2021    CO2 24 02/26/2021    AGAP 8 02/26/2021     (H) 02/26/2021    BUN 19 02/26/2021    CREA 1.20 02/26/2021    BUCR 16 02/26/2021    GFRAA 55 02/26/2021    GFRNA 46 02/26/2021    CA 8.2 (L) 02/26/2021    WBC 9.9 02/26/2021    RBC 3.77 (L) 02/26/2021    HGB 10.9 (L) 02/26/2021    HCT 34.1 (L) 02/26/2021    MCV 90.5 02/26/2021    MCH 28.9 02/26/2021    MCHC 32.0 02/26/2021    RDW 13.8 02/26/2021     02/26/2021    MPLV 9.6 02/26/2021    GRANS 88 (H) 02/26/2021    LYMPH 10 (L) 02/26/2021    MONOS 2 (L) 02/26/2021    EOS 0 02/26/2021    BASOS 0 02/26/2021    IG 0 02/26/2021    ANEU 8.7 (H) 02/26/2021    ABL 1.0 02/26/2021    ABM 0.2 02/26/2021    RIGOBERTO 0.0 02/26/2021    ABB 0.0 02/26/2021    AIG 0.0 02/26/2021        CT scan of abdomen and pelvis - No results  CT Results (most recent):  No results found for this or any previous visit. Abdominal US- No results  US Results (most recent):  No results found for this or any previous visit. MRI and MRCP- No results  MRI Results (most recent):  No results found for this or any previous visit. Assessment:     1. Change in bowel habits. Patient had several episodes of diarrhea 3 months ago associated with bleeding.   She has had none since and actually tends more towards constipation now associated with incomplete evacuation and tenesmus. Certainly Metformin can cause diarrhea as could microscopic colitis with her omeprazole use. However this is no longer an issue so I feel these are less likely. I suspect now she has constipation and would benefit from increased fiber intake. I suspect she has some IBS and what experiencing presently, and her usual BMs is constipation with overflow diarrhea. She also could have an element of irritable bowel syndrome as well especially given the fact she is on some medications that can cause diarrhea and some that can cause constipation. 2. Rectal bleeding. Suspect due to the diarrhea illness (? Viral) caused rectal irritation and bleeding that has resolved. This could be due to an anal fissure, hemorrhoids, rectal irritation, ischemia, polyp, other and needs evaluated even though it is not recurred. 3. Esophageal reflux disease. This is controlled with omeprazole 20 mg a day. 4. Major depression. 5. Obesity. BMI is 33.32.  6. Type 2 diabetes mellitus. Her hemoglobin A1c is 6.2 so she is pretty well controlled. As above, the diabetes itself and use of Metformin could be causing her diarrhea. 7. Hypertension. This controlled with oral medication. 8. Colon cancer screening. She is due for repeat colonoscopy in 3/2022 due to last colonoscopy had a sub-optimal prep. However we will proceed given the fact she had an episode of rectal bleeding with colonoscopy at this time which will likely be in 2022 with the new year approaching. Also, with sub-optimal bowel prep for colonoscopy, is suggestive of constipation with overflow diarrhea. Plan:     1. Colonoscopy with MAC. Bowel prep magnesium citrate. I discussed the techniques involved with the procedure as well as the risks, benefits, and alternatives including but not limited to bleeding, infection, perforation requiring emergent surgery, missing lesions, death, and anesthesia related complications with the patient.   All questions and concerns were answered. The patient voiced understanding and agrees to proceed. 2. I recommend the patient start a high-fiber supplement such as sugar free Benefiber or generic equivalent. They are to take 1 tablespoon 1-2 times a day with adequate fluid intake. Also can be placed on food they are eating. They can also consider instead of Benefiber some other type of fiber if they choose, including Metamucil, Citrucel, etc.  3. Constipation- Future considerations include the use of Probiotics, MiraLAX, natural calm, Triphala, a bowel preparation such as PEG, Amitiza, Trulance, or Linzess, with dose and frequency titrated to effect. 4. Use Imodium AD 1-4x a day as needed for diarrhea, if it recurs in the future, adjusting to effect. 5. They need to hold their diabetes medication morning of the procedure. 6. Future considerations include changing her Metformin to something else that would control her diabetes but less likely to cause diarrhea. 7. Further recommendations pending the patient's clinical course, if needed. 8. The patient will follow up with me as needed.           Kenneth Moore MD

## 2021-12-22 ENCOUNTER — OFFICE VISIT (OUTPATIENT)
Dept: GASTROENTEROLOGY | Age: 61
End: 2021-12-22
Payer: OTHER GOVERNMENT

## 2021-12-22 VITALS
BODY MASS INDEX: 32.55 KG/M2 | DIASTOLIC BLOOD PRESSURE: 86 MMHG | SYSTOLIC BLOOD PRESSURE: 121 MMHG | WEIGHT: 240 LBS | HEART RATE: 92 BPM

## 2021-12-22 DIAGNOSIS — E78.5 HYPERLIPIDEMIA, UNSPECIFIED HYPERLIPIDEMIA TYPE: ICD-10-CM

## 2021-12-22 DIAGNOSIS — F33.0 MILD EPISODE OF RECURRENT MAJOR DEPRESSIVE DISORDER (HCC): ICD-10-CM

## 2021-12-22 DIAGNOSIS — Z12.11 COLON CANCER SCREENING: ICD-10-CM

## 2021-12-22 DIAGNOSIS — K21.9 GASTROESOPHAGEAL REFLUX DISEASE WITHOUT ESOPHAGITIS: ICD-10-CM

## 2021-12-22 DIAGNOSIS — R19.4 CHANGE IN BOWEL HABITS: Primary | ICD-10-CM

## 2021-12-22 DIAGNOSIS — I10 PRIMARY HYPERTENSION: ICD-10-CM

## 2021-12-22 DIAGNOSIS — Z86.010 HISTORY OF COLON POLYPS: ICD-10-CM

## 2021-12-22 DIAGNOSIS — R19.7 DIARRHEA, UNSPECIFIED TYPE: ICD-10-CM

## 2021-12-22 DIAGNOSIS — K62.5 RECTAL BLEEDING: ICD-10-CM

## 2021-12-22 PROCEDURE — 99204 OFFICE O/P NEW MOD 45 MIN: CPT | Performed by: INTERNAL MEDICINE

## 2021-12-22 NOTE — PROGRESS NOTES
Frannie Cross presents today for   Chief Complaint   Patient presents with    New Patient     In october patient had an episode where she had a diarrhea moment that was all of a sudden. The day after that her stool was nothing but blood. ( no blood in her stool recently ) ( last colo was 2017 . repeat in 5 years ) Overall patient feels fine now but feels like she isnt finish using the bathroom       Is someone accompanying this pt? no    Is the patient using any DME equipment during OV? no    Depression Screening:  No flowsheet data found. Learning Assessment:  No flowsheet data found. Fall Risk  No flowsheet data found. Coordination of Care:  1. Have you been to the ER, urgent care clinic since your last visit? Hospitalized since your last visit? no    2. Have you seen or consulted any other health care providers outside of the [address] [place] [address] since your last visit? Include any pap smears or colon screening.  Yes, PCP Joanna Moreira ([facility] medicine)

## 2022-01-20 ENCOUNTER — ANESTHESIA EVENT (OUTPATIENT)
Dept: ENDOSCOPY | Age: 62
End: 2022-01-20
Payer: OTHER GOVERNMENT

## 2022-01-20 RX ORDER — SODIUM CHLORIDE 0.9 % (FLUSH) 0.9 %
5-40 SYRINGE (ML) INJECTION EVERY 8 HOURS
Status: CANCELLED | OUTPATIENT
Start: 2022-01-20

## 2022-01-25 ENCOUNTER — ANESTHESIA (OUTPATIENT)
Dept: ENDOSCOPY | Age: 62
End: 2022-01-25
Payer: OTHER GOVERNMENT

## 2022-02-18 ENCOUNTER — HOSPITAL ENCOUNTER (OUTPATIENT)
Dept: PREADMISSION TESTING | Age: 62
Discharge: HOME | End: 2022-02-18
Payer: OTHER GOVERNMENT

## 2022-02-18 LAB — SARS-COV-2, COV2: NORMAL

## 2022-02-18 PROCEDURE — U0003 INFECTIOUS AGENT DETECTION BY NUCLEIC ACID (DNA OR RNA); SEVERE ACUTE RESPIRATORY SYNDROME CORONAVIRUS 2 (SARS-COV-2) (CORONAVIRUS DISEASE [COVID-19]), AMPLIFIED PROBE TECHNIQUE, MAKING USE OF HIGH THROUGHPUT TECHNOLOGIES AS DESCRIBED BY CMS-2020-01-R: HCPCS

## 2022-02-19 LAB — SARS-COV-2, COV2NT: NOT DETECTED

## 2022-02-22 ENCOUNTER — HOSPITAL ENCOUNTER (OUTPATIENT)
Age: 62
Setting detail: OUTPATIENT SURGERY
Discharge: HOME | End: 2022-02-22
Attending: INTERNAL MEDICINE | Admitting: INTERNAL MEDICINE
Payer: OTHER GOVERNMENT

## 2022-02-22 VITALS
BODY MASS INDEX: 29.57 KG/M2 | OXYGEN SATURATION: 99 % | HEART RATE: 82 BPM | WEIGHT: 218 LBS | RESPIRATION RATE: 16 BRPM | TEMPERATURE: 97 F | DIASTOLIC BLOOD PRESSURE: 66 MMHG | SYSTOLIC BLOOD PRESSURE: 113 MMHG

## 2022-02-22 LAB
GLUCOSE BLD STRIP.AUTO-MCNC: 121 MG/DL (ref 70–110)
PERFORMED BY, TECHID: ABNORMAL

## 2022-02-22 PROCEDURE — 77030037186 HC VLV ENDOSC STRL DEFENDO DISP MVAT -A: Performed by: INTERNAL MEDICINE

## 2022-02-22 PROCEDURE — 74011250636 HC RX REV CODE- 250/636: Performed by: NURSE ANESTHETIST, CERTIFIED REGISTERED

## 2022-02-22 PROCEDURE — 2709999900 HC NON-CHARGEABLE SUPPLY: Performed by: INTERNAL MEDICINE

## 2022-02-22 PROCEDURE — 76060000031 HC ANESTHESIA FIRST 0.5 HR: Performed by: INTERNAL MEDICINE

## 2022-02-22 PROCEDURE — 45378 DIAGNOSTIC COLONOSCOPY: CPT | Performed by: INTERNAL MEDICINE

## 2022-02-22 PROCEDURE — 74011000250 HC RX REV CODE- 250: Performed by: NURSE ANESTHETIST, CERTIFIED REGISTERED

## 2022-02-22 PROCEDURE — 82962 GLUCOSE BLOOD TEST: CPT

## 2022-02-22 PROCEDURE — 77030018831 HC SOL IRR H20 BAXT -A: Performed by: INTERNAL MEDICINE

## 2022-02-22 PROCEDURE — 76040000019: Performed by: INTERNAL MEDICINE

## 2022-02-22 PROCEDURE — 77030042138 HC TBNG SPECIAL -A: Performed by: INTERNAL MEDICINE

## 2022-02-22 RX ORDER — DIPHENHYDRAMINE HYDROCHLORIDE 50 MG/ML
12.5 INJECTION, SOLUTION INTRAMUSCULAR; INTRAVENOUS
Status: CANCELLED | OUTPATIENT
Start: 2022-02-22

## 2022-02-22 RX ORDER — INSULIN LISPRO 100 [IU]/ML
INJECTION, SOLUTION INTRAVENOUS; SUBCUTANEOUS ONCE
Status: DISCONTINUED | OUTPATIENT
Start: 2022-02-22 | End: 2022-02-22 | Stop reason: HOSPADM

## 2022-02-22 RX ORDER — SODIUM CHLORIDE 0.9 % (FLUSH) 0.9 %
5-40 SYRINGE (ML) INJECTION AS NEEDED
Status: CANCELLED | OUTPATIENT
Start: 2022-02-22

## 2022-02-22 RX ORDER — FLUMAZENIL 0.1 MG/ML
0.2 INJECTION INTRAVENOUS
Status: CANCELLED | OUTPATIENT
Start: 2022-02-22

## 2022-02-22 RX ORDER — ONDANSETRON 2 MG/ML
4 INJECTION INTRAMUSCULAR; INTRAVENOUS ONCE
Status: CANCELLED | OUTPATIENT
Start: 2022-02-22 | End: 2022-02-22

## 2022-02-22 RX ORDER — LIDOCAINE HYDROCHLORIDE 20 MG/ML
INJECTION, SOLUTION EPIDURAL; INFILTRATION; INTRACAUDAL; PERINEURAL AS NEEDED
Status: DISCONTINUED | OUTPATIENT
Start: 2022-02-22 | End: 2022-02-22 | Stop reason: HOSPADM

## 2022-02-22 RX ORDER — EPHEDRINE SULFATE/0.9% NACL/PF 50 MG/5 ML
SYRINGE (ML) INTRAVENOUS AS NEEDED
Status: DISCONTINUED | OUTPATIENT
Start: 2022-02-22 | End: 2022-02-22 | Stop reason: HOSPADM

## 2022-02-22 RX ORDER — FENTANYL CITRATE 50 UG/ML
50 INJECTION, SOLUTION INTRAMUSCULAR; INTRAVENOUS AS NEEDED
Status: CANCELLED | OUTPATIENT
Start: 2022-02-22

## 2022-02-22 RX ORDER — PROPOFOL 10 MG/ML
INJECTION, EMULSION INTRAVENOUS AS NEEDED
Status: DISCONTINUED | OUTPATIENT
Start: 2022-02-22 | End: 2022-02-22 | Stop reason: HOSPADM

## 2022-02-22 RX ORDER — NALOXONE HYDROCHLORIDE 0.4 MG/ML
0.1 INJECTION, SOLUTION INTRAMUSCULAR; INTRAVENOUS; SUBCUTANEOUS
Status: CANCELLED | OUTPATIENT
Start: 2022-02-22

## 2022-02-22 RX ORDER — SODIUM CHLORIDE 0.9 % (FLUSH) 0.9 %
5-40 SYRINGE (ML) INJECTION AS NEEDED
Status: DISCONTINUED | OUTPATIENT
Start: 2022-02-22 | End: 2022-02-22 | Stop reason: HOSPADM

## 2022-02-22 RX ORDER — SODIUM CHLORIDE, SODIUM LACTATE, POTASSIUM CHLORIDE, CALCIUM CHLORIDE 600; 310; 30; 20 MG/100ML; MG/100ML; MG/100ML; MG/100ML
25 INJECTION, SOLUTION INTRAVENOUS CONTINUOUS
Status: CANCELLED | OUTPATIENT
Start: 2022-02-22 | End: 2022-02-22

## 2022-02-22 RX ORDER — SODIUM CHLORIDE, SODIUM LACTATE, POTASSIUM CHLORIDE, CALCIUM CHLORIDE 600; 310; 30; 20 MG/100ML; MG/100ML; MG/100ML; MG/100ML
25 INJECTION, SOLUTION INTRAVENOUS CONTINUOUS
Status: DISCONTINUED | OUTPATIENT
Start: 2022-02-22 | End: 2022-02-22 | Stop reason: HOSPADM

## 2022-02-22 RX ADMIN — PROPOFOL 30 MG: 10 INJECTION, EMULSION INTRAVENOUS at 09:57

## 2022-02-22 RX ADMIN — PROPOFOL 100 MG: 10 INJECTION, EMULSION INTRAVENOUS at 09:48

## 2022-02-22 RX ADMIN — PROPOFOL 20 MG: 10 INJECTION, EMULSION INTRAVENOUS at 09:59

## 2022-02-22 RX ADMIN — LIDOCAINE HYDROCHLORIDE 40 MG: 20 INJECTION, SOLUTION EPIDURAL; INFILTRATION; INTRACAUDAL; PERINEURAL at 09:48

## 2022-02-22 RX ADMIN — Medication 10 MG: at 09:53

## 2022-02-22 RX ADMIN — Medication 10 MG: at 10:02

## 2022-02-22 RX ADMIN — SODIUM CHLORIDE, POTASSIUM CHLORIDE, SODIUM LACTATE AND CALCIUM CHLORIDE 25 ML/HR: 600; 310; 30; 20 INJECTION, SOLUTION INTRAVENOUS at 08:52

## 2022-02-22 NOTE — ANESTHESIA POSTPROCEDURE EVALUATION
Procedure(s):  COLONOSCOPY.    MAC, total IV anesthesia    Anesthesia Post Evaluation      Multimodal analgesia: multimodal analgesia used between 6 hours prior to anesthesia start to PACU discharge  Patient location during evaluation: PACU  Patient participation: complete - patient participated  Level of consciousness: awake and alert  Pain management: adequate  Airway patency: patent  Anesthetic complications: no  Cardiovascular status: acceptable  Respiratory status: acceptable  Hydration status: acceptable  Comments: Ok to discharge when standard criteria are met.    Post anesthesia nausea and vomiting:  none  Final Post Anesthesia Temperature Assessment:  Normothermia (36.0-37.5 degrees C)      INITIAL Post-op Vital signs:   Vitals Value Taken Time   /57 02/22/22 1007   Temp 36.1 °C (97 °F) 02/22/22 1007   Pulse 87 02/22/22 1007   Resp 16 02/22/22 1007   SpO2 98 % 02/22/22 [facility]

## 2022-02-22 NOTE — H&P
Date of Surgery Update:  Waylon Delgado was seen and examined. History and physical has been reviewed. The patient has been examined.  There have been no significant clinical changes since the completion of the originally dated History and Physical.    Signed By: Gil Daniel MD     February 22, 2022 8:52 AM

## 2022-02-22 NOTE — PROCEDURES
Colonoscopy procedure note    Date of service: 02/22/2022    Type: Screening    Indication for procedure: Colon cancer screening, diarrhea, rectal bleeding. Anesthesia classification: ASA class 2    Patient history and physical been accomplished and documented. Patient is assessed and determined to be appropriate candidate for planned procedure and sedation; patient reassessed immediately prior to sedation. Sedation plan: MAC per anesthesia    Surgical assistant: Not applicable    Airway assessment: Range of motion: Normal, mouth opening, Visual obstruction: No.    UPDATED PREOP EXAM:  Unchanged. VS: Reviewed  Gen: in NAD  CV: RRR, no murmur  Resp: CTA  Abd: Soft, NTND, +BS  Extrem: No cyanosis or edema  Neuro: Awake and alert    Informed consent obtained: Yes. The indications, risks including but not limited to bleeding, perforation, infection, death, and potential failure to visual areas are diagnosed neoplasia, alternatives and benefits were discussed with the patient prior to the procedure. Patient identity and procedure was verified, absent was obtained, and is consistent with the consent form found in the patient's records. PROCEDURE PERFORMED:  COLONOSCOPY  to the cecum with MAC     INSTRUMENT: Olympus colonoscope per nursing notes. FINDINGS:    External anal lesions: Normal   Rectum: normal.   Retroflexion view: Normal  Sigmoid: normal   Descending Colon: normal   Transverse Colon: normal   Ascending Colon: normal   Cecum: normal   Terminal ileum: not evaluated     Specimens: None    Bowel preparation- adequate to detect small (5mm) polyps or larger. However there was some thick liquid stool scattered throughout the colon he required washing and suctioning to clear it for visualization. Estimated blood loss: none   Complications:  none   Cecal withdrawal time: 6 minutes. Comments:  none     Impression:  1. Normal colonoscopy to the cecum.   2. No polyps or masses were seen.  3. Suspect irritable bowel syndrome intermittently causing symptoms. Recommendations:  1. Repeat colonoscopy in  10 years for screening. 3.   Follow up as needed.

## 2022-02-22 NOTE — ANESTHESIA PREPROCEDURE EVALUATION
Relevant Problems   NEUROLOGY   (+) Depression, major      CARDIOVASCULAR   (+) Essential hypertension      GASTROINTESTINAL   (+) Chronic GERD      ENDOCRINE   (+) Diabetes mellitus type 2, controlled ([facility] [address]       Anesthetic History   No history of anesthetic complications            Review of Systems / Medical History  Patient summary reviewed, nursing notes reviewed and pertinent labs reviewed    Pulmonary  Within defined limits                 Neuro/Psych         Psychiatric history    Comments: Depression Cardiovascular    Hypertension          Hyperlipidemia    Exercise tolerance: >4 METS     GI/Hepatic/Renal     GERD           Endo/Other    Diabetes: type 2         Other Findings              Physical Exam    Airway  Mallampati: II  TM Distance: 4 - 6 cm  Neck ROM: decreased range of motion   Mouth opening: Diminished (comment)    Comments: Previous ACDF Cardiovascular  Regular rate and rhythm,  S1 and S2 normal,  no murmur, click, rub, or gallop  Rhythm: regular  Rate: normal         Dental  No notable dental hx       Pulmonary  Breath sounds clear to auscultation               Abdominal  GI exam deferred       Other Findings            Anesthetic Plan    ASA: 2  Anesthesia type: MAC          Induction: Intravenous  Anesthetic plan and risks discussed with: Patient

## 2022-03-19 PROBLEM — E11.9 DIABETES MELLITUS TYPE 2, CONTROLLED (HCC): Status: ACTIVE | Noted: 2021-02-25

## 2022-03-19 PROBLEM — S92.109A TALUS FRACTURE: Status: ACTIVE | Noted: 2021-02-25

## 2022-03-19 PROBLEM — S82.202A LEFT TIBIAL FRACTURE: Status: ACTIVE | Noted: 2021-02-25

## 2022-03-19 PROBLEM — Z98.890 S/P SURGICAL MANIPULATION OF ANKLE JOINT: Status: ACTIVE | Noted: 2021-02-25

## 2022-03-19 PROBLEM — E78.2 HYPERLIPIDEMIA, MIXED: Status: ACTIVE | Noted: 2021-02-25

## 2022-03-19 PROBLEM — F32.9 DEPRESSION, MAJOR: Status: ACTIVE | Noted: 2021-02-25

## 2022-03-19 PROBLEM — M25.571 ACUTE RIGHT ANKLE PAIN: Status: ACTIVE | Noted: 2021-02-25

## 2022-03-19 PROBLEM — K21.9 CHRONIC GERD: Status: ACTIVE | Noted: 2021-02-25

## 2022-03-20 PROBLEM — I10 ESSENTIAL HYPERTENSION: Status: ACTIVE | Noted: 2021-02-25

## 2022-03-20 PROBLEM — S82.841A ANKLE FRACTURE, BIMALLEOLAR, CLOSED, RIGHT, INITIAL ENCOUNTER: Status: ACTIVE | Noted: 2021-02-25

## 2022-10-06 ENCOUNTER — OFFICE VISIT (OUTPATIENT)
Dept: GASTROENTEROLOGY | Age: 62
End: 2022-10-06
Payer: OTHER GOVERNMENT

## 2022-10-06 VITALS
HEIGHT: 72 IN | BODY MASS INDEX: 29.85 KG/M2 | WEIGHT: 220.4 LBS | SYSTOLIC BLOOD PRESSURE: 104 MMHG | HEART RATE: 106 BPM | OXYGEN SATURATION: 94 % | DIASTOLIC BLOOD PRESSURE: 70 MMHG

## 2022-10-06 DIAGNOSIS — K21.9 GASTROESOPHAGEAL REFLUX DISEASE WITHOUT ESOPHAGITIS: Primary | ICD-10-CM

## 2022-10-06 DIAGNOSIS — Z12.11 COLON CANCER SCREENING: ICD-10-CM

## 2022-10-06 DIAGNOSIS — I10 PRIMARY HYPERTENSION: ICD-10-CM

## 2022-10-06 DIAGNOSIS — R93.3 ABNORMAL CT SCAN, GASTROINTESTINAL TRACT: ICD-10-CM

## 2022-10-06 DIAGNOSIS — E11.65 TYPE 2 DIABETES MELLITUS WITH HYPERGLYCEMIA, WITHOUT LONG-TERM CURRENT USE OF INSULIN (HCC): ICD-10-CM

## 2022-10-06 PROCEDURE — 99214 OFFICE O/P EST MOD 30 MIN: CPT | Performed by: INTERNAL MEDICINE

## 2022-10-06 RX ORDER — IBUPROFEN 800 MG/1
800 TABLET ORAL AS NEEDED
COMMUNITY
Start: 2021-08-20

## 2022-10-06 RX ORDER — CITALOPRAM 20 MG/1
20 TABLET, FILM COATED ORAL DAILY
COMMUNITY
Start: 2022-05-01

## 2022-10-06 NOTE — H&P (VIEW-ONLY)
Referring Physician:  None     Chief Complaint: Diarrhea    Date of service: 12/22/2021    Subjective:     History of Present Illness:  Patient is a female [address] [facility] [place] 58 y.o.  who is seen for evaluation for diarrhea. The patient has GI complaints of diarrhea for several months. 10/2021 had an episode of diarrhea associated with BRBPR. She had diarrhea almost all night long, and the bleeding started the next day. Since then, she describes her bowel movements as follows- hard to have a BM, associated with tenesmus and incomplete evacuation. She reports she has 2 BM a week on average, and when they occur they are loose. She is presently taking fiber gummies since 10/2021, 2 po daily. No real change. Patient has type 2 diabetes mellitus and does take Metformin ER. She also takes omeprazole daily for reflux. The patient denies nausea, vomiting, fever, chills, abdominal pain, reflux, dysphagia, weight loss, or melena. Last EGD-never. Last colonoscopy - 3/2017- normal colon but sub-optimal prep, so repeat colonoscopy recommended in 5 years. Family history for GI disease is significant for no known GI disease. The patient reports liver related risk factors including risks for fatty liver including obesity and type 2 diabetes mellitus. 10/06/22 office visit: The patient returns the office today with complaints of diarrhea and alternating constipation. She underwent colonoscopy by me 2/2022 which revealed:  Impression:  Normal colonoscopy to the cecum. No polyps or masses were seen. Suspect irritable bowel syndrome intermittently causing symptoms. Today she is having the following problems: Esophageal reflux. 8/30/22, the patient underwent CT of the chest with angiogram which revealed:      1. This impression segment pertains to noncardiac portion of interpretation. Reference narrative body of report for cardiac and aortic components of assessment.    2. Esophageal wall thickening and small hiatal hernia, recommend further evaluation with dedicated esophagram.   3. Groundglass opacity in the lingula, probably atelectasis or inflammatory/infectious process, recommend follow-up CT chest in 6-12 months. Patient reports she has regurgitation and reflux which is now being treated with omeprazole 2x a day successfully. If she tries to cut back to once daily, she will have more reflux. She denies any dysphagia, but does have to clear her throat and cough at times when eating. She has not lost any weight and appetite is good. She has never had an EGD. She is taking Benefiber 1 tablespoon 2x a day. Also using fiber gummies during the day. Past medical history is significant for type 2 diabetes mellitus, esophageal reflux disease, hypertension, hyperlipidemia, repair of right ankle fracture within the last 6 months and depression. PMH:  Past Medical History:   Diagnosis Date    Diabetes ([address]     GERD (gastroesophageal reflux disease)     Hyperlipemia     Hypertension     Psychiatric disorder         PSH:  Past Surgical History:   Procedure Laterality Date    COLONOSCOPY N/A 2/22/2022    COLONOSCOPY performed by Ligia Stern MD at [facility] ENDOSCOPY    HX CERVICAL FUSION          Allergies:  No Known Allergies     Home Medications:  Cannot display prior to admission medications because the patient has not been admitted in this contact. Hospital Medications:  Current Outpatient Medications   Medication Sig    citalopram (CELEXA) 20 mg tablet Take 20 mg by mouth daily. ibuprofen (MOTRIN) 800 mg tablet Take 800 mg by mouth as needed. loratadine (CLARITIN) 10 mg tablet     metFORMIN ER (GLUCOPHAGE XR) 500 mg tablet TAKE 1 TABLET DAILY    gabapentin (NEURONTIN) 100 mg capsule     omeprazole (PRILOSEC) 20 mg capsule     losartan (COZAAR) 50 mg tablet Take 50 mg by mouth daily. multivitamin (ONE A DAY) tablet Take 1 Tab by mouth daily.     atorvastatin (LIPITOR) 20 mg tablet Take 20 mg by mouth daily. buPROPion XL (WELLBUTRIN XL) 300 mg XL tablet Take 300 mg by mouth every morning. hydroCHLOROthiazide (MICROZIDE) 12.5 mg capsule Take 12.5 mg by mouth daily. calcium-cholecalciferol, d3, (CALCIUM 600 + D) 600-125 mg-unit tab Take 600 mg by mouth. (Patient not taking: Reported on 10/6/2022)     No current facility-administered medications for this visit. Social History:  Social History     Tobacco Use    Smoking status: Never    Smokeless tobacco: Never   Substance Use Topics    Alcohol use: Not Currently        Pt denies any history of IV drug use, blood transfusions. Family History:  History reviewed. No pertinent family history. Review of Systems:  A detailed 10 system ROS is obtained, with pertinent positives as listed above. All others are negative unless listed in history above. Constitutional denies fever chills, headache, or weight loss. Skin- denies lesions or rashes. HEENT- denies any vision or hearing problems, epistaxis, sore throat, or dental problems. Lungs- no shortness of breath or chest pain reported, no dyspnea on exertion. Cardiac- no palpitations or chest pain reported, including at rest or on exertion. GI-no abdominal pain, melena, rectal bleeding, reflux, dysphagia, jaundice, change in stool or urine color, constipation or diarrhea. Genitourinary -no dysuria or hematuria. Musculoskeletal-no muscle weakness or disuse or atrophy. Neurologic-no numbness, tingling, gait disturbance, or other abnormalities. Rheumatologic- patient denies any immune or rheumatologic diseases or symptoms. Endocrine- patient denies any endocrine abnormalities including thyroid disease or diabetes. Psychologic-patient denies depression, anxiety or emotional issues. No reported memory issues.         Objective:     Physical Exam:  Vitals: /70 (BP 1 Location: Left arm, BP Patient Position: Sitting, BP Cuff Size: Adult)   Pulse (!) 106   Ht 6' (1.829 m)   Wt 100 kg (220 lb 6.4 oz)   SpO2 94%   BMI 29.89 kg/m²    Brief physical exam:    General-alert and oriented x3. Ambulation is normal.  HEENT - no obvious facial lesions. Abdomen-soft, nontender without hepatosplenomegaly or masses. Neuro-no focal neurological deficits. Musculoskeletal-good range of motion and no focal deficits.       Laboratory:        Lab Results   Component Value Date     02/26/2021    K 3.9 02/26/2021     02/26/2021    CO2 24 02/26/2021    AGAP 8 02/26/2021     (H) 02/26/2021    BUN 19 02/26/2021    CREA 1.20 02/26/2021    BUCR 16 02/26/2021    GFRAA 55 02/26/2021    GFRNA 46 02/26/2021    CA 8.2 (L) 02/26/2021    WBC 9.9 02/26/2021    RBC 3.77 (L) 02/26/2021    HGB 10.9 (L) 02/26/2021    HCT 34.1 (L) 02/26/2021    MCV 90.5 02/26/2021    MCH 28.9 02/26/2021    MCHC 32.0 02/26/2021    RDW 13.8 02/26/2021     02/26/2021    MPLV 9.6 02/26/2021    GRANS 88 (H) 02/26/2021    LYMPH 10 (L) 02/26/2021    MONOS 2 (L) 02/26/2021    EOS 0 02/26/2021    BASOS 0 02/26/2021    IG 0 02/26/2021    ANEU 8.7 (H) 02/26/2021    ABL 1.0 02/26/2021    ABM 0.2 02/26/2021    RIGOBERTO 0.0 02/26/2021    ABB 0.0 02/26/2021    AIG 0.0 02/26/2021   results  Lab Results   Component Value Date     02/26/2021    K 3.9 02/26/2021     02/26/2021    CO2 24 02/26/2021    AGAP 8 02/26/2021     (H) 02/26/2021    BUN 19 02/26/2021    CREA 1.20 02/26/2021    BUCR 16 02/26/2021    GFRAA 55 02/26/2021    GFRNA 46 02/26/2021    CA 8.2 (L) 02/26/2021    WBC 9.9 02/26/2021    RBC 3.77 (L) 02/26/2021    HGB 10.9 (L) 02/26/2021    HCT 34.1 (L) 02/26/2021    MCV 90.5 02/26/2021    MCH 28.9 02/26/2021    MCHC 32.0 02/26/2021    RDW 13.8 02/26/2021     02/26/2021    MPLV 9.6 02/26/2021    GRANS 88 (H) 02/26/2021    LYMPH 10 (L) 02/26/2021    MONOS 2 (L) 02/26/2021    EOS 0 02/26/2021    BASOS 0 02/26/2021    IG 0 02/26/2021    ANEU 8.7 (H) 02/26/2021    ABL 1.0 02/26/2021    ABM 0.2 02/26/2021    RIGOBERTO 0.0 02/26/2021    ABB 0.0 02/26/2021    AIG 0.0 02/26/2021        CT scan of abdomen and pelvis - No results  CT Results (most recent):  No results found for this or any previous visit. Abdominal US- No results  US Results (most recent):  No results found for this or any previous visit. MRI and MRCP- No results  MRI Results (most recent):  No results found for this or any previous visit. Assessment:     Esophageal reflux and abnormal CT scan. Patient is on omeprazole 20 mg a day for reflux. The thickening on her CT scan may be esophagitis but could also be an overcall as often happens especially with a small hiatal hernia being present. Evaluation of her upper GI tract is reasonable to exclude pathology. Change in bowel habits. Patient had several episodes of diarrhea 3 months ago associated with bleeding. She has had none since and actually tends more towards constipation now associated with incomplete evacuation and tenesmus. Certainly Metformin can cause diarrhea as could microscopic colitis with her omeprazole use. However this is no longer an issue so I feel these are less likely. I suspect now she has constipation and would benefit from increased fiber intake. I suspect she has some IBS and what experiencing presently, and her usual BMs is constipation with overflow diarrhea. She also could have an element of irritable bowel syndrome as well especially given the fact she is on some medications that can cause diarrhea and some that can cause constipation. Rectal bleeding. Suspect due to the diarrhea illness (? Viral) caused rectal irritation and bleeding that has resolved. This could be due to an anal fissure, hemorrhoids, rectal irritation, ischemia, polyp, other and needs evaluated even though it is not recurred. Esophageal reflux disease. This is controlled with omeprazole 20 mg a day. Major depression. Obesity. BMI is 33.32.   Type 2 diabetes mellitus. Her hemoglobin A1c is 6.2 so she is pretty well controlled. As above, the diabetes itself and use of Metformin could be causing her diarrhea. Hypertension. This controlled with oral medication. Plan:     EGD with MAC. I discussed the techniques involved with the procedure as well as the risks, benefits, and alternatives including but not limited to bleeding, infection, perforation requiring emergent surgery, missing lesions, death, and anesthesia related complications with the patient. All questions and concerns were answered. The patient voiced understanding and agrees to proceed. Antireflux precautions. This includes waiting at least 3 hours after eating before lying down to prevent reflux. They need to hold any diabetes medication they may be taking the morning of the procedure. Continue omeprazole 20 mg po b.i.d.  if she does have esophagitis, will increase to 40 mg po b.i.d.   I recommend the patient continue a  high-fiber supplement such as sugar free Benefiber or generic equivalent, plus the fiber gummies. Constipation- Future considerations include the use of Probiotics, MiraLAX, natural calm, Triphala, a bowel preparation such as PEG, Amitiza, Trulance, or Linzess, with dose and frequency titrated to effect. Use Imodium AD 1-4x a day as needed for diarrhea, if it recurs in the future, adjusting to effect. Further recommendations pending the patient's clinical course, if needed. The patient will follow up with me as needed.           Hollie Trujillo MD

## 2022-10-06 NOTE — PROGRESS NOTES
Referring Physician:  None     Chief Complaint: Diarrhea    Date of service: 12/22/2021    Subjective:     History of Present Illness:  Patient is a female Swathi Thompson 58 y.o.  who is seen for evaluation for diarrhea. The patient has GI complaints of diarrhea for several months. 10/2021 had an episode of diarrhea associated with BRBPR. She had diarrhea almost all night long, and the bleeding started the next day. Since then, she describes her bowel movements as follows- hard to have a BM, associated with tenesmus and incomplete evacuation. She reports she has 2 BM a week on average, and when they occur they are loose. She is presently taking fiber gummies since 10/2021, 2 po daily. No real change. Patient has type 2 diabetes mellitus and does take Metformin ER. She also takes omeprazole daily for reflux. The patient denies nausea, vomiting, fever, chills, abdominal pain, reflux, dysphagia, weight loss, or melena. Last EGD-never. Last colonoscopy - 3/2017- normal colon but sub-optimal prep, so repeat colonoscopy recommended in 5 years. Family history for GI disease is significant for no known GI disease. The patient reports liver related risk factors including risks for fatty liver including obesity and type 2 diabetes mellitus. 10/06/22 office visit: The patient returns the office today with complaints of diarrhea and alternating constipation. She underwent colonoscopy by me 2/2022 which revealed:  Impression:  Normal colonoscopy to the cecum. No polyps or masses were seen. Suspect irritable bowel syndrome intermittently causing symptoms. Today she is having the following problems: Esophageal reflux. 8/30/22, the patient underwent CT of the chest with angiogram which revealed:      1. This impression segment pertains to noncardiac portion of interpretation. Reference narrative body of report for cardiac and aortic components of assessment.    2. Esophageal wall thickening and small hiatal hernia, recommend further evaluation with dedicated esophagram.   3. Groundglass opacity in the lingula, probably atelectasis or inflammatory/infectious process, recommend follow-up CT chest in 6-12 months. Patient reports she has regurgitation and reflux which is now being treated with omeprazole 2x a day successfully. If she tries to cut back to once daily, she will have more reflux. She denies any dysphagia, but does have to clear her throat and cough at times when eating. She has not lost any weight and appetite is good. She has never had an EGD. She is taking Benefiber 1 tablespoon 2x a day. Also using fiber gummies during the day. Past medical history is significant for type 2 diabetes mellitus, esophageal reflux disease, hypertension, hyperlipidemia, repair of right ankle fracture within the last 6 months and depression. PMH:  Past Medical History:   Diagnosis Date    Diabetes ([address]     GERD (gastroesophageal reflux disease)     Hyperlipemia     Hypertension     Psychiatric disorder         PSH:  Past Surgical History:   Procedure Laterality Date    COLONOSCOPY N/A 2/22/2022    COLONOSCOPY performed by Luis A Spears MD at [facility] ENDOSCOPY    HX CERVICAL FUSION          Allergies:  No Known Allergies     Home Medications:  Cannot display prior to admission medications because the patient has not been admitted in this contact. Hospital Medications:  Current Outpatient Medications   Medication Sig    citalopram (CELEXA) 20 mg tablet Take 20 mg by mouth daily. ibuprofen (MOTRIN) 800 mg tablet Take 800 mg by mouth as needed. loratadine (CLARITIN) 10 mg tablet     metFORMIN ER (GLUCOPHAGE XR) 500 mg tablet TAKE 1 TABLET DAILY    gabapentin (NEURONTIN) 100 mg capsule     omeprazole (PRILOSEC) 20 mg capsule     losartan (COZAAR) 50 mg tablet Take 50 mg by mouth daily. multivitamin (ONE A DAY) tablet Take 1 Tab by mouth daily.     atorvastatin (LIPITOR) 20 mg tablet Take 20 mg by mouth daily. buPROPion XL (WELLBUTRIN XL) 300 mg XL tablet Take 300 mg by mouth every morning. hydroCHLOROthiazide (MICROZIDE) 12.5 mg capsule Take 12.5 mg by mouth daily. calcium-cholecalciferol, d3, (CALCIUM 600 + D) 600-125 mg-unit tab Take 600 mg by mouth. (Patient not taking: Reported on 10/6/2022)     No current facility-administered medications for this visit. Social History:  Social History     Tobacco Use    Smoking status: Never    Smokeless tobacco: Never   Substance Use Topics    Alcohol use: Not Currently        Pt denies any history of IV drug use, blood transfusions. Family History:  History reviewed. No pertinent family history. Review of Systems:  A detailed 10 system ROS is obtained, with pertinent positives as listed above. All others are negative unless listed in history above. Constitutional denies fever chills, headache, or weight loss. Skin- denies lesions or rashes. HEENT- denies any vision or hearing problems, epistaxis, sore throat, or dental problems. Lungs- no shortness of breath or chest pain reported, no dyspnea on exertion. Cardiac- no palpitations or chest pain reported, including at rest or on exertion. GI-no abdominal pain, melena, rectal bleeding, reflux, dysphagia, jaundice, change in stool or urine color, constipation or diarrhea. Genitourinary -no dysuria or hematuria. Musculoskeletal-no muscle weakness or disuse or atrophy. Neurologic-no numbness, tingling, gait disturbance, or other abnormalities. Rheumatologic- patient denies any immune or rheumatologic diseases or symptoms. Endocrine- patient denies any endocrine abnormalities including thyroid disease or diabetes. Psychologic-patient denies depression, anxiety or emotional issues. No reported memory issues.         Objective:     Physical Exam:  Vitals: /70 (BP 1 Location: Left arm, BP Patient Position: Sitting, BP Cuff Size: Adult)   Pulse (!) 106   Ht 6' (1.829 m)   Wt 100 kg (220 lb 6.4 oz)   SpO2 94%   BMI 29.89 kg/m²    Brief physical exam:    General-alert and oriented x3. Ambulation is normal.  HEENT - no obvious facial lesions. Abdomen-soft, nontender without hepatosplenomegaly or masses. Neuro-no focal neurological deficits. Musculoskeletal-good range of motion and no focal deficits.       Laboratory:        Lab Results   Component Value Date     02/26/2021    K 3.9 02/26/2021     02/26/2021    CO2 24 02/26/2021    AGAP 8 02/26/2021     (H) 02/26/2021    BUN 19 02/26/2021    CREA 1.20 02/26/2021    BUCR 16 02/26/2021    GFRAA 55 02/26/2021    GFRNA 46 02/26/2021    CA 8.2 (L) 02/26/2021    WBC 9.9 02/26/2021    RBC 3.77 (L) 02/26/2021    HGB 10.9 (L) 02/26/2021    HCT 34.1 (L) 02/26/2021    MCV 90.5 02/26/2021    MCH 28.9 02/26/2021    MCHC 32.0 02/26/2021    RDW 13.8 02/26/2021     02/26/2021    MPLV 9.6 02/26/2021    GRANS 88 (H) 02/26/2021    LYMPH 10 (L) 02/26/2021    MONOS 2 (L) 02/26/2021    EOS 0 02/26/2021    BASOS 0 02/26/2021    IG 0 02/26/2021    ANEU 8.7 (H) 02/26/2021    ABL 1.0 02/26/2021    ABM 0.2 02/26/2021    RIGOBERTO 0.0 02/26/2021    ABB 0.0 02/26/2021    AIG 0.0 02/26/2021   results  Lab Results   Component Value Date     02/26/2021    K 3.9 02/26/2021     02/26/2021    CO2 24 02/26/2021    AGAP 8 02/26/2021     (H) 02/26/2021    BUN 19 02/26/2021    CREA 1.20 02/26/2021    BUCR 16 02/26/2021    GFRAA 55 02/26/2021    GFRNA 46 02/26/2021    CA 8.2 (L) 02/26/2021    WBC 9.9 02/26/2021    RBC 3.77 (L) 02/26/2021    HGB 10.9 (L) 02/26/2021    HCT 34.1 (L) 02/26/2021    MCV 90.5 02/26/2021    MCH 28.9 02/26/2021    MCHC 32.0 02/26/2021    RDW 13.8 02/26/2021     02/26/2021    MPLV 9.6 02/26/2021    GRANS 88 (H) 02/26/2021    LYMPH 10 (L) 02/26/2021    MONOS 2 (L) 02/26/2021    EOS 0 02/26/2021    BASOS 0 02/26/2021    IG 0 02/26/2021    ANEU 8.7 (H) 02/26/2021    ABL 1.0 02/26/2021    ABM 0.2 02/26/2021    RIGOBERTO 0.0 02/26/2021    ABB 0.0 02/26/2021    AIG 0.0 02/26/2021        CT scan of abdomen and pelvis - No results  CT Results (most recent):  No results found for this or any previous visit. Abdominal US- No results  US Results (most recent):  No results found for this or any previous visit. MRI and MRCP- No results  MRI Results (most recent):  No results found for this or any previous visit. Assessment:     Esophageal reflux and abnormal CT scan. Patient is on omeprazole 20 mg a day for reflux. The thickening on her CT scan may be esophagitis but could also be an overcall as often happens especially with a small hiatal hernia being present. Evaluation of her upper GI tract is reasonable to exclude pathology. Change in bowel habits. Patient had several episodes of diarrhea 3 months ago associated with bleeding. She has had none since and actually tends more towards constipation now associated with incomplete evacuation and tenesmus. Certainly Metformin can cause diarrhea as could microscopic colitis with her omeprazole use. However this is no longer an issue so I feel these are less likely. I suspect now she has constipation and would benefit from increased fiber intake. I suspect she has some IBS and what experiencing presently, and her usual BMs is constipation with overflow diarrhea. She also could have an element of irritable bowel syndrome as well especially given the fact she is on some medications that can cause diarrhea and some that can cause constipation. Rectal bleeding. Suspect due to the diarrhea illness (? Viral) caused rectal irritation and bleeding that has resolved. This could be due to an anal fissure, hemorrhoids, rectal irritation, ischemia, polyp, other and needs evaluated even though it is not recurred. Esophageal reflux disease. This is controlled with omeprazole 20 mg a day. Major depression. Obesity. BMI is 33.32.   Type 2 diabetes mellitus. Her hemoglobin A1c is 6.2 so she is pretty well controlled. As above, the diabetes itself and use of Metformin could be causing her diarrhea. Hypertension. This controlled with oral medication. Plan:     EGD with MAC. I discussed the techniques involved with the procedure as well as the risks, benefits, and alternatives including but not limited to bleeding, infection, perforation requiring emergent surgery, missing lesions, death, and anesthesia related complications with the patient. All questions and concerns were answered. The patient voiced understanding and agrees to proceed. Antireflux precautions. This includes waiting at least 3 hours after eating before lying down to prevent reflux. They need to hold any diabetes medication they may be taking the morning of the procedure. Continue omeprazole 20 mg po b.i.d.  if she does have esophagitis, will increase to 40 mg po b.i.d.   I recommend the patient continue a  high-fiber supplement such as sugar free Benefiber or generic equivalent, plus the fiber gummies. Constipation- Future considerations include the use of Probiotics, MiraLAX, natural calm, Triphala, a bowel preparation such as PEG, Amitiza, Trulance, or Linzess, with dose and frequency titrated to effect. Use Imodium AD 1-4x a day as needed for diarrhea, if it recurs in the future, adjusting to effect. Further recommendations pending the patient's clinical course, if needed. The patient will follow up with me as needed.           Adelaide López MD

## 2022-10-06 NOTE — PROGRESS NOTES
Kierra Ibarra presents today for an evaluation of a hiatal hernia and patient states that she was informed that there is a wall thickening. Patient states that she is constantly clearing her throat and coughing at night. Patient states that this past Monday and Tuesday that she had greenish-grey stools. Is someone accompanying this pt? No    Is the patient using any DME equipment during OV? No    Depression Screening:  3 most recent PHQ Screens 10/6/2022   Little interest or pleasure in doing things Not at all   Feeling down, depressed, irritable, or hopeless Not at all   Total Score PHQ 2 0       Learning Assessment:  No flowsheet data found. Abuse Screening:  No flowsheet data found. Fall Risk  No flowsheet data found. ADL  No flowsheet data found. Health Maintenance reviewed and discussed and ordered per Provider. Health Maintenance Due   Topic Date Due    Hepatitis C Screening  Never done    Foot Exam Q1  Never done    MICROALBUMIN Q1  Never done    Eye Exam Retinal or Dilated  Never done    Depression Monitoring  Never done    A1C test (Diabetic or Prediabetic)  02/26/2022    COVID-19 Vaccine (4 - Booster for Pfizer series) 03/12/2022    Flu Vaccine (1) 08/01/2022   . Coordination of Care:  1. Have you been to the ER, urgent care clinic since your last visit? Hospitalized since your last visit? No    2. Have you seen or consulted any other health care providers outside of the [address] [place] [address] since your last visit? Include any pap smears or colon screening.  NO

## 2022-10-19 ENCOUNTER — ANESTHESIA EVENT (OUTPATIENT)
Dept: ENDOSCOPY | Age: 62
End: 2022-10-19
Payer: OTHER GOVERNMENT

## 2022-10-19 RX ORDER — INSULIN LISPRO 100 [IU]/ML
INJECTION, SOLUTION INTRAVENOUS; SUBCUTANEOUS ONCE
Status: CANCELLED | OUTPATIENT
Start: 2022-10-19 | End: 2022-10-19

## 2022-10-26 ENCOUNTER — HOSPITAL ENCOUNTER (OUTPATIENT)
Age: 62
Setting detail: OUTPATIENT SURGERY
Discharge: HOME | End: 2022-10-26
Attending: INTERNAL MEDICINE | Admitting: INTERNAL MEDICINE
Payer: OTHER GOVERNMENT

## 2022-10-26 ENCOUNTER — ANESTHESIA (OUTPATIENT)
Dept: ENDOSCOPY | Age: 62
End: 2022-10-26
Payer: OTHER GOVERNMENT

## 2022-10-26 VITALS
HEART RATE: 71 BPM | HEIGHT: 72 IN | DIASTOLIC BLOOD PRESSURE: 71 MMHG | OXYGEN SATURATION: 100 % | RESPIRATION RATE: 16 BRPM | SYSTOLIC BLOOD PRESSURE: 126 MMHG | BODY MASS INDEX: 29.12 KG/M2 | WEIGHT: 215 LBS | TEMPERATURE: 97 F

## 2022-10-26 PROCEDURE — 77030037186 HC VLV ENDOSC STRL DEFENDO DISP MVAT -A: Performed by: INTERNAL MEDICINE

## 2022-10-26 PROCEDURE — 74011250636 HC RX REV CODE- 250/636: Performed by: NURSE ANESTHETIST, CERTIFIED REGISTERED

## 2022-10-26 PROCEDURE — 43235 EGD DIAGNOSTIC BRUSH WASH: CPT | Performed by: INTERNAL MEDICINE

## 2022-10-26 PROCEDURE — 77030018831 HC SOL IRR H20 BAXT -A: Performed by: INTERNAL MEDICINE

## 2022-10-26 PROCEDURE — 77030042138 HC TBNG SPECIAL -A: Performed by: INTERNAL MEDICINE

## 2022-10-26 PROCEDURE — 2709999900 HC NON-CHARGEABLE SUPPLY: Performed by: INTERNAL MEDICINE

## 2022-10-26 PROCEDURE — 76060000031 HC ANESTHESIA FIRST 0.5 HR: Performed by: INTERNAL MEDICINE

## 2022-10-26 PROCEDURE — 76040000019: Performed by: INTERNAL MEDICINE

## 2022-10-26 RX ORDER — PROPOFOL 10 MG/ML
INJECTION, EMULSION INTRAVENOUS AS NEEDED
Status: DISCONTINUED | OUTPATIENT
Start: 2022-10-26 | End: 2022-10-26 | Stop reason: HOSPADM

## 2022-10-26 RX ORDER — SODIUM CHLORIDE, SODIUM LACTATE, POTASSIUM CHLORIDE, CALCIUM CHLORIDE 600; 310; 30; 20 MG/100ML; MG/100ML; MG/100ML; MG/100ML
75 INJECTION, SOLUTION INTRAVENOUS CONTINUOUS
Status: DISCONTINUED | OUTPATIENT
Start: 2022-10-26 | End: 2022-10-26 | Stop reason: HOSPADM

## 2022-10-26 RX ORDER — SODIUM CHLORIDE, SODIUM LACTATE, POTASSIUM CHLORIDE, CALCIUM CHLORIDE 600; 310; 30; 20 MG/100ML; MG/100ML; MG/100ML; MG/100ML
25 INJECTION, SOLUTION INTRAVENOUS CONTINUOUS
Status: DISCONTINUED | OUTPATIENT
Start: 2022-10-26 | End: 2022-10-26 | Stop reason: HOSPADM

## 2022-10-26 RX ORDER — SODIUM CHLORIDE 0.9 % (FLUSH) 0.9 %
5-40 SYRINGE (ML) INJECTION EVERY 8 HOURS
Status: DISCONTINUED | OUTPATIENT
Start: 2022-10-26 | End: 2022-10-26 | Stop reason: HOSPADM

## 2022-10-26 RX ORDER — SODIUM CHLORIDE 0.9 % (FLUSH) 0.9 %
5-40 SYRINGE (ML) INJECTION AS NEEDED
Status: DISCONTINUED | OUTPATIENT
Start: 2022-10-26 | End: 2022-10-26 | Stop reason: HOSPADM

## 2022-10-26 RX ADMIN — PROPOFOL 200 MG: 10 INJECTION, EMULSION INTRAVENOUS at 15:42

## 2022-10-26 RX ADMIN — SODIUM CHLORIDE, POTASSIUM CHLORIDE, SODIUM LACTATE AND CALCIUM CHLORIDE 25 ML/HR: 600; 310; 30; 20 INJECTION, SOLUTION INTRAVENOUS at 13:25

## 2022-10-26 NOTE — INTERVAL H&P NOTE
Update History & Physical    The Patient's History and Physical of October 26, 2022  The procedure was reviewed with the patient and I examined the patient. There was no change. The surgical site was confirmed by the patient and me. Plan:  The risk, benefits, expected outcome, and alternative to the recommended procedure have been discussed with the patient. Patient understands and wants to proceed with the procedure.     Electronically signed by Gomez Ivey MD on 10/26/2022 at 2:34 PM

## 2022-10-26 NOTE — ANESTHESIA POSTPROCEDURE EVALUATION
Procedure(s):  ESOPHAGOGASTRODUODENOSCOPY (EGD). total IV anesthesia    Anesthesia Post Evaluation      Multimodal analgesia: multimodal analgesia used between 6 hours prior to anesthesia start to PACU discharge  Patient location during evaluation: bedside  Patient participation: complete - patient participated  Level of consciousness: awake and responsive to physical stimuli  Pain management: satisfactory to patient  Airway patency: patent  Anesthetic complications: no  Cardiovascular status: acceptable  Respiratory status: acceptable  Hydration status: acceptable  Comments: Patient is awake and comfortable post operatively. Report to RN at bedside.   Post anesthesia nausea and vomiting:  none  Final Post Anesthesia Temperature Assessment:  Normothermia (36.0-37.5 degrees C)      INITIAL Post-op Vital signs:   Vitals Value Taken Time   /76 10/26/22 1547   Temp 36.4 °C (97.5 °F) 10/26/22 1547   Pulse 78 10/26/22 1547   Resp 16 10/26/22 1547   SpO2 98 % 10/26/22 1547

## 2022-10-26 NOTE — H&P
EGD procedure note    Date of procedure: 10/26/22     Indication for procedure: Esophageal reflux, abnormal CT scan    Type of procedure: Upper endoscopy     Anesthesia classification: ASA class 2    Patient history and physical has been accomplished and documented. Patient is assessed and determined to be an appropriate candidate for planned procedure and sedation. The patient was assessed immediately prior to sedation. Sedation plan: MAC per anesthesia. Surgical assistant: Not applicable    Airway assessment: Range of motion: normal, mouth opening: Normal, visual obstruction: No.    PREOP EXAM:  Unchanged. VS: Reviewed  Gen: WDWN in NAD  CV: RRR, no murmur  Resp: CTAB  Abd: Soft, NTND, +BS  Extrem: No cyanosis or edema  Neuro: Awake and alert      Informed consent obtained: Yes. The indications, risks, including but not limited to bleeding, perforation, infection, death, potential for failure to visualize or diagnose neoplasia, alternatives, benefits, discussed in detail with the patient prior to the procedure. Patient understands and agrees to the procedure. Patient identity and procedure were verified, consent was obtained, and is consistent with the consent form in the patient's records. INSTRUMENT: Olympus upper endoscope    PROCEDURE FINDINGS:    OROPHARYNX: Normal    ESOPHAGUS:  Esophageal mucosa normal with no masses noted in the proximal, mid, and distal esophagus. No endoscopic features of eosinophilic esophagitis were noted. The Z-line was at 40 cm. and was normal.   No abnormal thickening was seen in the distal esophagus as suggested on the CT scan. A 2 cm hiatal hernia was noted distally. STOMACH: Stomach was then evaluated including the cardia and fundus on retroflexion view. Evaluation of the gastric body, antrum, and pylorus were normal, including normal gastric mucosa with no masses, ulcers, or mucosal abnormalities.     Retroflexion view of the cardia and fundus were normal.     DUODENUM:  The duodenal bulb and descending duodenum were then evaluated and found to be normal including no masses, ulcers, or or mucosal abnormalities. SPECIMENS: None    ESTIMATED BLOOD LOSS:  None. COMPLICATIONS:  None     COMMENTS: none    Impression:  1.  2 cm hiatal hernia. 2.  Otherwise normal upper endoscopy. No abnormal thickening as suggested on CT scan. Recommendations:    1. Continue present PPI therapy. 2.  Further recommendations pending clinical course as needed. 3.  Follow up as needed.        Krista Rai MD

## 2022-10-26 NOTE — ANESTHESIA PREPROCEDURE EVALUATION
Relevant Problems   NEUROLOGY   (+) Depression, major      CARDIOVASCULAR   (+) Essential hypertension      GASTROINTESTINAL   (+) Chronic GERD      ENDOCRINE   (+) Diabetes mellitus type 2, controlled ([facility] [address]       Anesthetic History   No history of anesthetic complications            Review of Systems / Medical History  Patient summary reviewed, nursing notes reviewed and pertinent labs reviewed    Pulmonary  Within defined limits                 Neuro/Psych         Psychiatric history     Cardiovascular    Hypertension              Exercise tolerance: >4 METS     GI/Hepatic/Renal     GERD           Endo/Other    Diabetes         Other Findings              Physical Exam    Airway  Mallampati: II  TM Distance: 4 - 6 cm  Neck ROM: normal range of motion   Mouth opening: Normal     Cardiovascular  Regular rate and rhythm,  S1 and S2 normal,  no murmur, click, rub, or gallop  Rhythm: regular  Rate: normal         Dental  No notable dental hx       Pulmonary  Breath sounds clear to auscultation               Abdominal  GI exam deferred       Other Findings            Anesthetic Plan    ASA: 2  Anesthesia type: total IV anesthesia          Induction: Intravenous  Anesthetic plan and risks discussed with: Patient
Wheelchair/Stroller

## 2023-11-08 ENCOUNTER — OFFICE VISIT (OUTPATIENT)
Age: 63
End: 2023-11-08
Payer: OTHER GOVERNMENT

## 2023-11-08 DIAGNOSIS — F32.89 OTHER DEPRESSION: ICD-10-CM

## 2023-11-08 DIAGNOSIS — R19.4 CHANGE IN BOWEL HABITS: Primary | ICD-10-CM

## 2023-11-08 DIAGNOSIS — K21.9 GASTROESOPHAGEAL REFLUX DISEASE WITHOUT ESOPHAGITIS: ICD-10-CM

## 2023-11-08 DIAGNOSIS — K58.2 IRRITABLE BOWEL SYNDROME WITH BOTH CONSTIPATION AND DIARRHEA: ICD-10-CM

## 2023-11-08 DIAGNOSIS — E11.65 TYPE 2 DIABETES MELLITUS WITH HYPERGLYCEMIA, WITHOUT LONG-TERM CURRENT USE OF INSULIN (HCC): ICD-10-CM

## 2023-11-08 DIAGNOSIS — K59.04 CHRONIC IDIOPATHIC CONSTIPATION: ICD-10-CM

## 2023-11-08 PROCEDURE — 99214 OFFICE O/P EST MOD 30 MIN: CPT | Performed by: INTERNAL MEDICINE

## 2023-11-08 RX ORDER — ORAL SEMAGLUTIDE 7 MG/1
7 TABLET ORAL DAILY
COMMUNITY
Start: 2023-09-20

## 2024-09-01 ENCOUNTER — HOSPITAL ENCOUNTER (EMERGENCY)
Age: 64
Discharge: HOME | End: 2024-09-01
Attending: EMERGENCY MEDICINE
Payer: OTHER GOVERNMENT

## 2024-09-01 ENCOUNTER — APPOINTMENT (OUTPATIENT)
Age: 64
End: 2024-09-01
Payer: OTHER GOVERNMENT

## 2024-09-01 VITALS
HEIGHT: 72 IN | WEIGHT: 194 LBS | OXYGEN SATURATION: 97 % | TEMPERATURE: 98 F | HEART RATE: 70 BPM | BODY MASS INDEX: 26.28 KG/M2 | DIASTOLIC BLOOD PRESSURE: 75 MMHG | RESPIRATION RATE: 16 BRPM | SYSTOLIC BLOOD PRESSURE: 120 MMHG

## 2024-09-01 DIAGNOSIS — R06.09 DYSPNEA ON EXERTION: Primary | ICD-10-CM

## 2024-09-01 DIAGNOSIS — I49.3 PVC (PREMATURE VENTRICULAR CONTRACTION): ICD-10-CM

## 2024-09-01 DIAGNOSIS — R00.2 PALPITATIONS: ICD-10-CM

## 2024-09-01 LAB
ALBUMIN SERPL-MCNC: 3.7 G/DL (ref 3.4–5)
ALBUMIN/GLOB SERPL: 0.9 (ref 0.8–1.7)
ALP SERPL-CCNC: 140 U/L (ref 45–117)
ALT SERPL-CCNC: 25 U/L (ref 13–56)
ANION GAP SERPL CALC-SCNC: 9 MMOL/L (ref 3–18)
AST SERPL W P-5'-P-CCNC: 24 U/L (ref 10–38)
BASOPHILS # BLD: 0 K/UL (ref 0–0.1)
BASOPHILS NFR BLD: 1 % (ref 0–2)
BILIRUB SERPL-MCNC: 0.7 MG/DL (ref 0.2–1)
BNP SERPL-MCNC: 186 PG/ML (ref 0–900)
BUN SERPL-MCNC: 17 MG/DL (ref 7–18)
BUN/CREAT SERPL: 12 (ref 12–20)
CA-I BLD-MCNC: 9.5 MG/DL (ref 8.5–10.1)
CHLORIDE SERPL-SCNC: 105 MMOL/L (ref 100–111)
CO2 SERPL-SCNC: 24 MMOL/L (ref 21–32)
CREAT SERPL-MCNC: 1.45 MG/DL (ref 0.6–1.3)
D DIMER PPP FEU-MCNC: 0.58 UG/ML(FEU)
DIFFERENTIAL METHOD BLD: ABNORMAL
EKG ATRIAL RATE: 67 BPM
EKG ATRIAL RATE: 86 BPM
EKG DIAGNOSIS: NORMAL
EKG DIAGNOSIS: NORMAL
EKG P AXIS: 35 DEGREES
EKG P AXIS: 49 DEGREES
EKG P-R INTERVAL: 134 MS
EKG P-R INTERVAL: 150 MS
EKG Q-T INTERVAL: 372 MS
EKG Q-T INTERVAL: 410 MS
EKG QRS DURATION: 82 MS
EKG QRS DURATION: 84 MS
EKG QTC CALCULATION (BAZETT): 433 MS
EKG QTC CALCULATION (BAZETT): 445 MS
EKG R AXIS: -51 DEGREES
EKG R AXIS: -61 DEGREES
EKG T AXIS: -21 DEGREES
EKG T AXIS: 18 DEGREES
EKG VENTRICULAR RATE: 67 BPM
EKG VENTRICULAR RATE: 86 BPM
EOSINOPHIL # BLD: 0.2 K/UL (ref 0–0.4)
EOSINOPHIL NFR BLD: 3 % (ref 0–5)
ERYTHROCYTE [DISTWIDTH] IN BLOOD BY AUTOMATED COUNT: 13.8 % (ref 11.6–14.5)
GLOBULIN SER CALC-MCNC: 3.9 G/DL (ref 2–4)
GLUCOSE SERPL-MCNC: 96 MG/DL (ref 74–99)
HCT VFR BLD AUTO: 34.8 % (ref 35–45)
HGB BLD-MCNC: 12 G/DL (ref 12–16)
IMM GRANULOCYTES # BLD AUTO: 0 K/UL (ref 0–0.04)
IMM GRANULOCYTES NFR BLD AUTO: 0 % (ref 0–0.5)
LYMPHOCYTES # BLD: 2 K/UL (ref 0.9–3.6)
LYMPHOCYTES NFR BLD: 31 % (ref 21–52)
MAGNESIUM SERPL-MCNC: 2.2 MG/DL (ref 1.6–2.6)
MCH RBC QN AUTO: 30 PG (ref 24–34)
MCHC RBC AUTO-ENTMCNC: 34.5 G/DL (ref 31–37)
MCV RBC AUTO: 87 FL (ref 78–100)
MONOCYTES # BLD: 0.5 K/UL (ref 0.05–1.2)
MONOCYTES NFR BLD: 7 % (ref 3–10)
NEUTS SEG # BLD: 3.8 K/UL (ref 1.8–8)
NEUTS SEG NFR BLD: 58 % (ref 40–73)
NRBC # BLD: 0 K/UL (ref 0–0.01)
NRBC BLD-RTO: 0 PER 100 WBC
PLATELET # BLD AUTO: 240 K/UL (ref 135–420)
PMV BLD AUTO: 9.2 FL (ref 9.2–11.8)
POTASSIUM SERPL-SCNC: 4.3 MMOL/L (ref 3.5–5.5)
PROT SERPL-MCNC: 7.6 G/DL (ref 6.4–8.2)
RBC # BLD AUTO: 4 M/UL (ref 4.2–5.3)
SODIUM SERPL-SCNC: 138 MMOL/L (ref 136–145)
TROPONIN I SERPL HS-MCNC: 3 NG/L (ref 0–54)
TROPONIN I SERPL HS-MCNC: 4 NG/L (ref 0–54)
WBC # BLD AUTO: 6.5 K/UL (ref 4.6–13.2)

## 2024-09-01 PROCEDURE — 71045 X-RAY EXAM CHEST 1 VIEW: CPT

## 2024-09-01 PROCEDURE — 85025 COMPLETE CBC W/AUTO DIFF WBC: CPT

## 2024-09-01 PROCEDURE — 99285 EMERGENCY DEPT VISIT HI MDM: CPT

## 2024-09-01 PROCEDURE — 83735 ASSAY OF MAGNESIUM: CPT

## 2024-09-01 PROCEDURE — 93005 ELECTROCARDIOGRAM TRACING: CPT | Performed by: EMERGENCY MEDICINE

## 2024-09-01 PROCEDURE — 83880 ASSAY OF NATRIURETIC PEPTIDE: CPT

## 2024-09-01 PROCEDURE — 80053 COMPREHEN METABOLIC PANEL: CPT

## 2024-09-01 PROCEDURE — 85379 FIBRIN DEGRADATION QUANT: CPT

## 2024-09-01 PROCEDURE — 84484 ASSAY OF TROPONIN QUANT: CPT

## 2024-09-01 ASSESSMENT — LIFESTYLE VARIABLES
HOW OFTEN DO YOU HAVE A DRINK CONTAINING ALCOHOL: NEVER
HOW MANY STANDARD DRINKS CONTAINING ALCOHOL DO YOU HAVE ON A TYPICAL DAY: PATIENT DOES NOT DRINK

## 2024-09-01 NOTE — ED TRIAGE NOTES
Fatigue for awhile, states last night she started having palpations and shortness of breath. Worse after working out or working in the yard. Blood work done by PCP, is suppose to be taking iron supplements, but stopped due to constipation.

## 2024-09-01 NOTE — ED PROVIDER NOTES
ventricular contraction)        PLAN:  1. D/C Home  2.      Medication List        ASK your doctor about these medications      atorvastatin 20 MG tablet  Commonly known as: LIPITOR     buPROPion 300 MG extended release tablet  Commonly known as: WELLBUTRIN XL     citalopram 20 MG tablet  Commonly known as: CELEXA     gabapentin 100 MG capsule  Commonly known as: NEURONTIN     ibuprofen 800 MG tablet  Commonly known as: ADVIL;MOTRIN     loratadine 10 MG tablet  Commonly known as: CLARITIN     losartan 50 MG tablet  Commonly known as: COZAAR     metFORMIN 500 MG extended release tablet  Commonly known as: GLUCOPHAGE-XR     omeprazole 20 MG delayed release capsule  Commonly known as: PRILOSEC     Rybelsus 7 MG Tabs  Generic drug: Semaglutide            3. [facility]  _______________________________    This note was partially transcribed via voice recognition software. Although efforts have been made to catch any discrepancies, it may contain sound alike words, grammatical errors, or nonsensical words.             Bryon Baum MD  09/02/24 8056

## 2025-01-07 SDOH — HEALTH STABILITY: PHYSICAL HEALTH: ON AVERAGE, HOW MANY DAYS PER WEEK DO YOU ENGAGE IN MODERATE TO STRENUOUS EXERCISE (LIKE A BRISK WALK)?: 2 DAYS

## 2025-01-07 SDOH — HEALTH STABILITY: PHYSICAL HEALTH: ON AVERAGE, HOW MANY MINUTES DO YOU ENGAGE IN EXERCISE AT THIS LEVEL?: 120 MIN

## 2025-01-10 ENCOUNTER — OFFICE VISIT (OUTPATIENT)
Dept: FAMILY MEDICINE CLINIC | Facility: CLINIC | Age: 65
End: 2025-01-10
Payer: MEDICARE

## 2025-01-10 VITALS
HEART RATE: 109 BPM | SYSTOLIC BLOOD PRESSURE: 110 MMHG | DIASTOLIC BLOOD PRESSURE: 73 MMHG | WEIGHT: 197.5 LBS | RESPIRATION RATE: 18 BRPM | TEMPERATURE: 97.5 F | OXYGEN SATURATION: 98 % | BODY MASS INDEX: 26.75 KG/M2 | HEIGHT: 72 IN

## 2025-01-10 DIAGNOSIS — E11.9 CONTROLLED TYPE 2 DIABETES MELLITUS WITHOUT COMPLICATION, WITHOUT LONG-TERM CURRENT USE OF INSULIN (HCC): Primary | ICD-10-CM

## 2025-01-10 DIAGNOSIS — Z11.59 NEED FOR HEPATITIS C SCREENING TEST: ICD-10-CM

## 2025-01-10 DIAGNOSIS — E78.5 HYPERLIPIDEMIA, UNSPECIFIED HYPERLIPIDEMIA TYPE: ICD-10-CM

## 2025-01-10 DIAGNOSIS — E53.8 COBALAMIN DEFICIENCY: ICD-10-CM

## 2025-01-10 DIAGNOSIS — D64.9 ANEMIA, UNSPECIFIED TYPE: ICD-10-CM

## 2025-01-10 DIAGNOSIS — I10 HYPERTENSION, UNSPECIFIED TYPE: ICD-10-CM

## 2025-01-10 DIAGNOSIS — E55.9 VITAMIN D DEFICIENCY: ICD-10-CM

## 2025-01-10 PROCEDURE — 3078F DIAST BP <80 MM HG: CPT

## 2025-01-10 PROCEDURE — G8427 DOCREV CUR MEDS BY ELIG CLIN: HCPCS

## 2025-01-10 PROCEDURE — 99204 OFFICE O/P NEW MOD 45 MIN: CPT

## 2025-01-10 PROCEDURE — 3017F COLORECTAL CA SCREEN DOC REV: CPT

## 2025-01-10 PROCEDURE — 1036F TOBACCO NON-USER: CPT

## 2025-01-10 PROCEDURE — 1090F PRES/ABSN URINE INCON ASSESS: CPT

## 2025-01-10 PROCEDURE — G8400 PT W/DXA NO RESULTS DOC: HCPCS

## 2025-01-10 PROCEDURE — 2022F DILAT RTA XM EVC RTNOPTHY: CPT

## 2025-01-10 PROCEDURE — 3046F HEMOGLOBIN A1C LEVEL >9.0%: CPT

## 2025-01-10 PROCEDURE — 1123F ACP DISCUSS/DSCN MKR DOCD: CPT

## 2025-01-10 PROCEDURE — G8419 CALC BMI OUT NRM PARAM NOF/U: HCPCS

## 2025-01-10 PROCEDURE — 3074F SYST BP LT 130 MM HG: CPT

## 2025-01-10 RX ORDER — LOSARTAN POTASSIUM 100 MG/1
100 TABLET ORAL DAILY
COMMUNITY
Start: 2024-12-18

## 2025-01-10 RX ORDER — MULTIVIT-MIN/IRON/FA/VIT K/LUT 8MG-400MCG
1 TABLET ORAL DAILY
COMMUNITY

## 2025-01-10 SDOH — ECONOMIC STABILITY: FOOD INSECURITY: WITHIN THE PAST 12 MONTHS, YOU WORRIED THAT YOUR FOOD WOULD RUN OUT BEFORE YOU GOT MONEY TO BUY MORE.: NEVER TRUE

## 2025-01-10 SDOH — ECONOMIC STABILITY: FOOD INSECURITY: WITHIN THE PAST 12 MONTHS, THE FOOD YOU BOUGHT JUST DIDN'T LAST AND YOU DIDN'T HAVE MONEY TO GET MORE.: NEVER TRUE

## 2025-01-10 ASSESSMENT — ENCOUNTER SYMPTOMS
EYES NEGATIVE: 1
GASTROINTESTINAL NEGATIVE: 1
RESPIRATORY NEGATIVE: 1
ALLERGIC/IMMUNOLOGIC NEGATIVE: 1

## 2025-01-10 ASSESSMENT — PATIENT HEALTH QUESTIONNAIRE - PHQ9
7. TROUBLE CONCENTRATING ON THINGS, SUCH AS READING THE NEWSPAPER OR WATCHING TELEVISION: NOT AT ALL
9. THOUGHTS THAT YOU WOULD BE BETTER OFF DEAD, OR OF HURTING YOURSELF: NOT AT ALL
5. POOR APPETITE OR OVEREATING: NOT AT ALL
SUM OF ALL RESPONSES TO PHQ QUESTIONS 1-9: 0
4. FEELING TIRED OR HAVING LITTLE ENERGY: NOT AT ALL
6. FEELING BAD ABOUT YOURSELF - OR THAT YOU ARE A FAILURE OR HAVE LET YOURSELF OR YOUR FAMILY DOWN: NOT AT ALL
1. LITTLE INTEREST OR PLEASURE IN DOING THINGS: NOT AT ALL
SUM OF ALL RESPONSES TO PHQ QUESTIONS 1-9: 0
8. MOVING OR SPEAKING SO SLOWLY THAT OTHER PEOPLE COULD HAVE NOTICED. OR THE OPPOSITE, BEING SO FIGETY OR RESTLESS THAT YOU HAVE BEEN MOVING AROUND A LOT MORE THAN USUAL: NOT AT ALL
2. FEELING DOWN, DEPRESSED OR HOPELESS: NOT AT ALL
10. IF YOU CHECKED OFF ANY PROBLEMS, HOW DIFFICULT HAVE THESE PROBLEMS MADE IT FOR YOU TO DO YOUR WORK, TAKE CARE OF THINGS AT HOME, OR GET ALONG WITH OTHER PEOPLE: NOT DIFFICULT AT ALL
SUM OF ALL RESPONSES TO PHQ9 QUESTIONS 1 & 2: 0
SUM OF ALL RESPONSES TO PHQ QUESTIONS 1-9: 0
SUM OF ALL RESPONSES TO PHQ QUESTIONS 1-9: 0
3. TROUBLE FALLING OR STAYING ASLEEP: NOT AT ALL

## 2025-01-10 NOTE — PROGRESS NOTES
Amy Sloan is a 65 y.o. female presents with   Chief Complaint   Patient presents with    New Patient     65-year-old female patient presents today to establish care.  She states she has a past medical history of anxiety, depression, osteoarthritis, chronic kidney disease, IBS, hyperlipidemia, hypertension, restless leg syndrome.  She states she has seen nephrology and has not been told why she has chronic kidney disease however her kidney function has decreased.  She was seen in the last few years by cardiology for palpitations and had a full cardiac workup which was negative she states.  She denies any complaints today.   Diagnosis   1. Controlled type 2 diabetes mellitus without complication, without long-term current use of insulin (HCC)          2. Need for hepatitis C screening test       3. Screening for HIV without presence of risk factors       4. Anemia, unspecified type             5. Vitamin D deficiency       6. Cobalamin deficiency       7. Hyperlipidemia, unspecified hyperlipidemia type       8. Hypertension, unspecified type            /73 (Site: Right Upper Arm, Position: Sitting, Cuff Size: Medium Adult)   Pulse (!) 109   Temp 97.5 °F (36.4 °C) (Temporal)   Resp 18   Ht 1.829 m (6')   Wt 89.6 kg (197 lb 8 oz)   LMP 01/07/2009   SpO2 98%   BMI 26.79 kg/m²   Subjective:     Past Medical History:   Diagnosis Date    Allergic rhinitis 2020    Saw ENT    Anemia     Anxiety 1990    Chronic constipation     Ongoing problem for years    Chronic kidney disease 2017    Depression 1990    Controlled    Diabetes (HCC)     GERD (gastroesophageal reflux disease)     Hearing loss 2024    Need to get checked    Hyperlipemia     Hypertension     Irritable bowel syndrome 2020    Diagnosed    Obesity 2012    Lost 40 pounds since    Osteoarthritis 2024    Lower back- MRI    Psychiatric disorder     Restless legs syndrome 2015    Type 2 diabetes mellitus without complication (HCC) 2012

## 2025-01-10 NOTE — PROGRESS NOTES
Amy Sloan presents today for   Chief Complaint   Patient presents with    New Patient       Is someone accompanying this pt? no    Is the patient using any DME equipment during OV? no    Depression Screenin/10/2025     8:01 AM 10/6/2022     9:56 AM   PHQ-9 Questionaire   Little interest or pleasure in doing things 0 0   Feeling down, depressed, or hopeless 0 0   Trouble falling or staying asleep, or sleeping too much 0    Feeling tired or having little energy 0    Poor appetite or overeating 0    Feeling bad about yourself - or that you are a failure or have let yourself or your family down 0    Trouble concentrating on things, such as reading the newspaper or watching television 0    Moving or speaking so slowly that other people could have noticed. Or the opposite - being so fidgety or restless that you have been moving around a lot more than usual 0    Thoughts that you would be better off dead, or of hurting yourself in some way 0    PHQ-9 Total Score 0 0   If you checked off any problems, how difficult have these problems made it for you to do your work, take care of things at home, or get along with other people? 0        Fall Risk      1/10/2025     8:01 AM   Fall Risk   Do you feel unsteady or are you worried about falling?  no   2 or more falls in past year? no   Fall with injury in past year? no        Health Maintenance reviewed and discussed and ordered per Provider.    Health Maintenance Due   Topic Date Due    Diabetic foot exam  Never done    Lipids  Never done    Depression Monitoring  Never done    Diabetic Alb to Cr ratio (uACR) test  Never done    Diabetic retinal exam  Never done    Hepatitis C screen  Never done    Cervical cancer screen  Never done    DEXA (modify frequency per FRAX score)  Never done    A1C test (Diabetic or Prediabetic)  2022    Flu vaccine (1) 2024    COVID-19 Vaccine ( season) 2024    Annual Wellness Visit (Medicare)  Never

## 2025-01-14 ENCOUNTER — HOSPITAL ENCOUNTER (OUTPATIENT)
Age: 65
Discharge: HOME | End: 2025-01-17
Payer: MEDICARE

## 2025-01-14 DIAGNOSIS — E78.5 HYPERLIPIDEMIA, UNSPECIFIED HYPERLIPIDEMIA TYPE: ICD-10-CM

## 2025-01-14 DIAGNOSIS — I10 HYPERTENSION, UNSPECIFIED TYPE: ICD-10-CM

## 2025-01-14 DIAGNOSIS — E53.8 COBALAMIN DEFICIENCY: ICD-10-CM

## 2025-01-14 DIAGNOSIS — E55.9 VITAMIN D DEFICIENCY: ICD-10-CM

## 2025-01-14 DIAGNOSIS — D64.9 ANEMIA, UNSPECIFIED TYPE: ICD-10-CM

## 2025-01-14 DIAGNOSIS — Z11.59 NEED FOR HEPATITIS C SCREENING TEST: ICD-10-CM

## 2025-01-14 DIAGNOSIS — E11.9 CONTROLLED TYPE 2 DIABETES MELLITUS WITHOUT COMPLICATION, WITHOUT LONG-TERM CURRENT USE OF INSULIN (HCC): ICD-10-CM

## 2025-01-14 LAB
25(OH)D3 SERPL-MCNC: 40.5 NG/ML (ref 30–100)
ALBUMIN SERPL-MCNC: 3.9 G/DL (ref 3.4–5)
ALBUMIN/GLOB SERPL: 0.8 (ref 0.8–1.7)
ALP SERPL-CCNC: 127 U/L (ref 45–117)
ALT SERPL-CCNC: 34 U/L (ref 13–56)
ANION GAP SERPL CALC-SCNC: 6 MMOL/L (ref 3–18)
AST SERPL W P-5'-P-CCNC: 36 U/L (ref 10–38)
BILIRUB SERPL-MCNC: 0.7 MG/DL (ref 0.2–1)
BUN SERPL-MCNC: 13 MG/DL (ref 7–18)
BUN/CREAT SERPL: 9 (ref 12–20)
CA-I BLD-MCNC: 9.5 MG/DL (ref 8.5–10.1)
CHLORIDE SERPL-SCNC: 102 MMOL/L (ref 100–111)
CHOLEST SERPL-MCNC: 171 MG/DL
CO2 SERPL-SCNC: 24 MMOL/L (ref 21–32)
CREAT SERPL-MCNC: 1.44 MG/DL (ref 0.6–1.3)
CREAT UR-MCNC: 68 MG/DL (ref 30–125)
ERYTHROCYTE [DISTWIDTH] IN BLOOD BY AUTOMATED COUNT: 13.9 % (ref 11.6–14.5)
EST. AVERAGE GLUCOSE BLD GHB EST-MCNC: 114 MG/DL
FERRITIN SERPL-MCNC: 13 NG/ML (ref 8–388)
GLOBULIN SER CALC-MCNC: 4.9 G/DL (ref 2–4)
GLUCOSE SERPL-MCNC: 100 MG/DL (ref 74–99)
HBA1C MFR BLD: 5.6 % (ref 4.2–5.6)
HCT VFR BLD AUTO: 42.5 % (ref 35–45)
HDLC SERPL-MCNC: 75 MG/DL (ref 40–60)
HDLC SERPL: 2.3 (ref 0–5)
HGB BLD-MCNC: 13.8 G/DL (ref 12–16)
IRON SATN MFR SERPL: 24 % (ref 20–50)
IRON SERPL-MCNC: 155 UG/DL (ref 50–175)
LDLC SERPL CALC-MCNC: 71.2 MG/DL (ref 0–100)
LIPID PANEL: ABNORMAL
MCH RBC QN AUTO: 29.9 PG (ref 24–34)
MCHC RBC AUTO-ENTMCNC: 32.5 G/DL (ref 31–37)
MCV RBC AUTO: 92 FL (ref 78–100)
MICROALBUMIN UR-MCNC: 0.72 MG/DL (ref 0–3)
MICROALBUMIN/CREAT UR-RTO: 11 MGMALB/GCRE (ref 0–30)
NRBC # BLD: 0 K/UL (ref 0–0.01)
NRBC BLD-RTO: 0 PER 100 WBC
PLATELET # BLD AUTO: 243 K/UL (ref 135–420)
PMV BLD AUTO: 9.7 FL (ref 9.2–11.8)
POTASSIUM SERPL-SCNC: 4.4 MMOL/L (ref 3.5–5.5)
PROT SERPL-MCNC: 8.8 G/DL (ref 6.4–8.2)
RBC # BLD AUTO: 4.62 M/UL (ref 4.2–5.3)
SODIUM SERPL-SCNC: 132 MMOL/L (ref 136–145)
TIBC SERPL-MCNC: 656 UG/DL (ref 250–450)
TRIGL SERPL-MCNC: 124 MG/DL
TSH SERPL DL<=0.05 MIU/L-ACNC: 2.86 UIU/ML (ref 0.36–3.74)
VIT B12 SERPL-MCNC: 766 PG/ML (ref 211–911)
VLDLC SERPL CALC-MCNC: 24.8 MG/DL
WBC # BLD AUTO: 7.9 K/UL (ref 4.6–13.2)

## 2025-01-14 PROCEDURE — 83036 HEMOGLOBIN GLYCOSYLATED A1C: CPT

## 2025-01-14 PROCEDURE — 86803 HEPATITIS C AB TEST: CPT

## 2025-01-14 PROCEDURE — 82570 ASSAY OF URINE CREATININE: CPT

## 2025-01-14 PROCEDURE — 82306 VITAMIN D 25 HYDROXY: CPT

## 2025-01-14 PROCEDURE — 84443 ASSAY THYROID STIM HORMONE: CPT

## 2025-01-14 PROCEDURE — 80061 LIPID PANEL: CPT

## 2025-01-14 PROCEDURE — 85027 COMPLETE CBC AUTOMATED: CPT

## 2025-01-14 PROCEDURE — 83540 ASSAY OF IRON: CPT

## 2025-01-14 PROCEDURE — 36415 COLL VENOUS BLD VENIPUNCTURE: CPT

## 2025-01-14 PROCEDURE — 80053 COMPREHEN METABOLIC PANEL: CPT

## 2025-01-14 PROCEDURE — 82043 UR ALBUMIN QUANTITATIVE: CPT

## 2025-01-14 PROCEDURE — 82607 VITAMIN B-12: CPT

## 2025-01-14 PROCEDURE — 82728 ASSAY OF FERRITIN: CPT

## 2025-01-15 LAB
HCV AB SER IA-ACNC: 0.18 INDEX
HCV AB SERPL QL IA: NEGATIVE
HEPATITIS C COMMENT: NORMAL

## 2025-01-25 SDOH — HEALTH STABILITY: PHYSICAL HEALTH: ON AVERAGE, HOW MANY DAYS PER WEEK DO YOU ENGAGE IN MODERATE TO STRENUOUS EXERCISE (LIKE A BRISK WALK)?: 2 DAYS

## 2025-01-25 SDOH — HEALTH STABILITY: PHYSICAL HEALTH: ON AVERAGE, HOW MANY MINUTES DO YOU ENGAGE IN EXERCISE AT THIS LEVEL?: 90 MIN

## 2025-01-25 ASSESSMENT — PATIENT HEALTH QUESTIONNAIRE - PHQ9
5. POOR APPETITE OR OVEREATING: NOT AT ALL
10. IF YOU CHECKED OFF ANY PROBLEMS, HOW DIFFICULT HAVE THESE PROBLEMS MADE IT FOR YOU TO DO YOUR WORK, TAKE CARE OF THINGS AT HOME, OR GET ALONG WITH OTHER PEOPLE: NOT DIFFICULT AT ALL
1. LITTLE INTEREST OR PLEASURE IN DOING THINGS: NOT AT ALL
SUM OF ALL RESPONSES TO PHQ9 QUESTIONS 1 & 2: 0
2. FEELING DOWN, DEPRESSED OR HOPELESS: NOT AT ALL
SUM OF ALL RESPONSES TO PHQ QUESTIONS 1-9: 0
4. FEELING TIRED OR HAVING LITTLE ENERGY: NOT AT ALL
6. FEELING BAD ABOUT YOURSELF - OR THAT YOU ARE A FAILURE OR HAVE LET YOURSELF OR YOUR FAMILY DOWN: NOT AT ALL
9. THOUGHTS THAT YOU WOULD BE BETTER OFF DEAD, OR OF HURTING YOURSELF: NOT AT ALL
SUM OF ALL RESPONSES TO PHQ QUESTIONS 1-9: 0
SUM OF ALL RESPONSES TO PHQ QUESTIONS 1-9: 0
3. TROUBLE FALLING OR STAYING ASLEEP: NOT AT ALL
8. MOVING OR SPEAKING SO SLOWLY THAT OTHER PEOPLE COULD HAVE NOTICED. OR THE OPPOSITE, BEING SO FIGETY OR RESTLESS THAT YOU HAVE BEEN MOVING AROUND A LOT MORE THAN USUAL: NOT AT ALL
7. TROUBLE CONCENTRATING ON THINGS, SUCH AS READING THE NEWSPAPER OR WATCHING TELEVISION: NOT AT ALL
SUM OF ALL RESPONSES TO PHQ QUESTIONS 1-9: 0

## 2025-01-25 ASSESSMENT — LIFESTYLE VARIABLES
HOW MANY STANDARD DRINKS CONTAINING ALCOHOL DO YOU HAVE ON A TYPICAL DAY: PATIENT DOES NOT DRINK
HOW OFTEN DO YOU HAVE SIX OR MORE DRINKS ON ONE OCCASION: 1
HOW MANY STANDARD DRINKS CONTAINING ALCOHOL DO YOU HAVE ON A TYPICAL DAY: 0
HOW OFTEN DO YOU HAVE A DRINK CONTAINING ALCOHOL: 1
HOW OFTEN DO YOU HAVE A DRINK CONTAINING ALCOHOL: NEVER

## 2025-01-28 ENCOUNTER — OFFICE VISIT (OUTPATIENT)
Dept: FAMILY MEDICINE CLINIC | Facility: CLINIC | Age: 65
End: 2025-01-28

## 2025-01-28 VITALS
HEART RATE: 87 BPM | TEMPERATURE: 97.1 F | SYSTOLIC BLOOD PRESSURE: 115 MMHG | RESPIRATION RATE: 18 BRPM | DIASTOLIC BLOOD PRESSURE: 80 MMHG | BODY MASS INDEX: 26.53 KG/M2 | OXYGEN SATURATION: 95 % | WEIGHT: 195.9 LBS | HEIGHT: 72 IN

## 2025-01-28 DIAGNOSIS — N18.30 CHRONIC KIDNEY DISEASE (CKD), ACTIVE MEDICAL MANAGEMENT WITHOUT DIALYSIS, STAGE 3 (MODERATE) (HCC): ICD-10-CM

## 2025-01-28 DIAGNOSIS — Z00.00 WELCOME TO MEDICARE PREVENTIVE VISIT: Primary | ICD-10-CM

## 2025-01-28 NOTE — PATIENT INSTRUCTIONS
high blood pressure, and high cholesterol. If you think you may have a problem with alcohol or drug use, talk to your doctor.   Medicines    Take your medicines exactly as prescribed. Call your doctor if you think you are having a problem with your medicine.     If your doctor recommends aspirin, take the amount directed each day. Make sure you take aspirin and not another kind of pain reliever, such as acetaminophen (Tylenol).   When should you call for help?   Call 911 if you have symptoms of a heart attack. These may include:    Chest pain or pressure, or a strange feeling in the chest.     Sweating.     Shortness of breath.     Pain, pressure, or a strange feeling in the back, neck, jaw, or upper belly or in one or both shoulders or arms.     Lightheadedness or sudden weakness.     A fast or irregular heartbeat.   After you call 911, the  may tell you to chew 1 adult-strength or 2 to 4 low-dose aspirin. Wait for an ambulance. Do not try to drive yourself.  Watch closely for changes in your health, and be sure to contact your doctor if you have any problems.  Where can you learn more?  Go to https://www.[employer].net/patientEd and enter F075 to learn more about \"A Healthy Heart: Care Instructions.\"  Current as of: July 31, 2024  Content Version: 14.3  © 2024 [employer].   Care instructions adapted under license by [employer]. If you have questions about a medical condition or this instruction, always ask your healthcare professional. [employer], [employer], disclaims any warranty or liability for your use of this information.    Personalized Preventive Plan for Amy Sloan - 1/28/2025  Medicare offers a range of preventive health benefits. Some of the tests and screenings are paid in full while other may be subject to a deductible, co-insurance, and/or copay.  Some of these benefits include a comprehensive review of your medical history including lifestyle, illnesses that may run in  verbal instruction/written material

## 2025-01-28 NOTE — PROGRESS NOTES
Medicare Annual Wellness Visit    Amy Sloan is here for Medicare AWV    Assessment & Plan   Welcome to Medicare preventive visit       Reviewed lab work with patient discussed kidney function she states it has been this way for a while.  Will refer her to nephrology for further workup.  Return in about 3 months (around 4/28/2025).     Subjective       Patient's complete Health Risk Assessment and screening values have been reviewed and are found in Flowsheets. The following problems were reviewed today and where indicated follow up appointments were made and/or referrals ordered.    Positive Risk Factor Screenings with Interventions:              Inactivity:  On average, how many days per week do you engage in moderate to strenuous exercise (like a brisk walk)?: 2 days (!) Abnormal  On average, how many minutes do you engage in exercise at this level?: 90 min  Interventions:  Patient advised to follow up in the office for further evaluation and treatment    Poor Eating Habits/Diet:  Do you eat balanced/healthy meals regularly?: (!) No  Interventions:  Patient advised to follow-up in this office for further evaluation and treatment                        Objective   Vitals:    01/28/25 0834   BP: 115/80   Site: Right Upper Arm   Position: Sitting   Cuff Size: Medium Adult   Pulse: 87   Resp: 18   Temp: 97.1 °F (36.2 °C)   TempSrc: Temporal   SpO2: 95%   Weight: 88.9 kg (195 lb 14.4 oz)   Height: 1.829 m (6')      Body mass index is 26.57 kg/m².                  No Known Allergies  Prior to Visit Medications    Medication Sig Taking? Authorizing Provider   losartan (COZAAR) 100 MG tablet Take 1 tablet by mouth daily Yes Adina Mcgowan MD   Melatonin 5 MG CAPS Take 1 capsule by mouth nightly as needed Yes Adina Mcgowan MD   Multiple Vitamins-Minerals (CENTRUM SILVER WOMEN 50+) TABS Take 1 tablet by mouth daily Yes Adina Mcgowan MD   RYBELSUS 7 MG TABS Take 7 mg by mouth daily Yes Roslyn 
health care providers outside our system since your last visit?”    NO     “Have you had a pap smear?”    NO    No cervical cancer screening on file       “Have you had a diabetic eye exam?”    NO     No diabetic eye exam on file       Click Here for Release of Records Request

## 2025-02-24 ENCOUNTER — TELEPHONE (OUTPATIENT)
Dept: FAMILY MEDICINE CLINIC | Facility: CLINIC | Age: 65
End: 2025-02-24

## 2025-02-24 DIAGNOSIS — R00.2 PALPITATIONS: Primary | ICD-10-CM

## 2025-02-24 NOTE — TELEPHONE ENCOUNTER
----- Message -----   From: JUAQUIN Posey NP   Sent: 2/24/2025   11:11 AM EST   To: Frannie Ritchie LPN   Subject: FW: Appointment Request                           I have reviewed her medications dont see a mediction that typically would cause this. Some people have palpitations known as PVCs that occur and they feel them. I would suggest making sure she is hydrated this often plays a big role in PVCs. We can also check her K, Mag and sodium to make sure they aren't low.       ----- Message -----   From: Frannie Ritchie LPN   Sent: 2/24/2025   9:40 AM EST   To: JUAQUIN Posey NP   Subject: FW: Appointment Request                            Patient said that the last time this happened was last fall. She said she had to wear a heart monitor that her last pcp ordered. The results are in the chart since they are [facility]. She said a few years ago she saw cardiology and had a bunch of tests done, but they did not find anything wrong. She denies any chest pain or SOB. She said that it does not happen all the time and so far today she says she feels fine, but she did feel her heart racing last night. She is thinking maybe it is due to one of her medications. She said you were going to discuss changing her Rybelsus at her next apt in April.       ----- Message -----   From: Adelita Jo   Sent: 2/24/2025   7:04 AM EST   To: Frannie Ritchie LPN   Subject: FW: Appointment Request                            Can you triage her please?       ----- Message -----   From: Frannie Logan LPN   Sent: 2/21/2025   3:14 PM EST   To: Adelita Jo   Subject: FW: Appointment Request                               ----- Message -----   From: Amy Sloan   Sent: 2/21/2025   2:58 PM EST   To: [facility]  Staff   Subject: Appointment Request                                Appointment Request From: Amy Sloan      With Provider: JUAQUIN Mata NP [facility]

## 2025-03-03 ENCOUNTER — HOSPITAL ENCOUNTER (OUTPATIENT)
Age: 65
Setting detail: SPECIMEN
Discharge: HOME | End: 2025-03-06
Payer: MEDICARE

## 2025-03-03 DIAGNOSIS — R00.2 PALPITATIONS: ICD-10-CM

## 2025-03-03 LAB
ANION GAP SERPL CALC-SCNC: 6 MMOL/L (ref 3–18)
BUN SERPL-MCNC: 12 MG/DL (ref 7–18)
BUN/CREAT SERPL: 10 (ref 12–20)
CA-I BLD-MCNC: 8.5 MG/DL (ref 8.5–10.1)
CHLORIDE SERPL-SCNC: 104 MMOL/L (ref 100–111)
CO2 SERPL-SCNC: 26 MMOL/L (ref 21–32)
CREAT SERPL-MCNC: 1.26 MG/DL (ref 0.6–1.3)
GLUCOSE SERPL-MCNC: 75 MG/DL (ref 74–99)
MAGNESIUM SERPL-MCNC: 2.1 MG/DL (ref 1.6–2.6)
POTASSIUM SERPL-SCNC: 4.2 MMOL/L (ref 3.5–5.5)
SODIUM SERPL-SCNC: 136 MMOL/L (ref 136–145)

## 2025-03-03 PROCEDURE — 83735 ASSAY OF MAGNESIUM: CPT

## 2025-03-03 PROCEDURE — 36415 COLL VENOUS BLD VENIPUNCTURE: CPT

## 2025-03-03 PROCEDURE — 80048 BASIC METABOLIC PNL TOTAL CA: CPT

## 2025-06-23 ENCOUNTER — CLINICAL SUPPORT (OUTPATIENT)
Dept: FAMILY MEDICINE CLINIC | Facility: CLINIC | Age: 65
End: 2025-06-23
Payer: MEDICARE

## 2025-06-23 DIAGNOSIS — E11.9 CONTROLLED TYPE 2 DIABETES MELLITUS WITHOUT COMPLICATION, WITHOUT LONG-TERM CURRENT USE OF INSULIN (HCC): Primary | ICD-10-CM

## 2025-06-23 DIAGNOSIS — D64.9 ANEMIA, UNSPECIFIED TYPE: ICD-10-CM

## 2025-06-23 PROCEDURE — 36415 COLL VENOUS BLD VENIPUNCTURE: CPT

## 2025-06-23 PROCEDURE — 99211 OFF/OP EST MAY X REQ PHY/QHP: CPT

## 2025-06-23 PROCEDURE — 3044F HG A1C LEVEL LT 7.0%: CPT

## 2025-06-23 NOTE — PROGRESS NOTES
Verbal order from provider to draw labs in office    Patient presents for lab draw ordered by:    Ordering Provider:  lala  Ordering Department/Practice:  [facility]  Phone:  [phone]  Date Ordered:  062325    Labs were drawn and sent to [facility] by Frannie Ritchie LPN:    The following tubes were sent:    Gold  ( 1) and Lavender  ( 1)    Draw site attempted in right brachial and right hand. Both sites gave flash back and blood return, however, both sites stopped blood flow before obtaining blood into the test tubes. Draw site in left brachial, flash back and blood return received and completed lab tube collection.  Patient tolerated draw with no distress.

## 2025-06-26 LAB
ALBUMIN SERPL-MCNC: 4.3 G/DL (ref 3.9–4.9)
ALP SERPL-CCNC: 125 IU/L (ref 44–121)
ALT SERPL-CCNC: 20 IU/L (ref 0–32)
AST SERPL-CCNC: 25 IU/L (ref 0–40)
BILIRUB SERPL-MCNC: 0.4 MG/DL (ref 0–1.2)
BUN SERPL-MCNC: 17 MG/DL (ref 8–27)
BUN/CREAT SERPL: 12 (ref 12–28)
CALCIUM SERPL-MCNC: 9.1 MG/DL (ref 8.7–10.3)
CHLORIDE SERPL-SCNC: 96 MMOL/L (ref 96–106)
CO2 SERPL-SCNC: 21 MMOL/L (ref 20–29)
CREAT SERPL-MCNC: 1.38 MG/DL (ref 0.57–1)
EGFRCR SERPLBLD CKD-EPI 2021: 42 ML/MIN/1.73
GLOBULIN SER CALC-MCNC: 2.4 G/DL (ref 1.5–4.5)
GLUCOSE SERPL-MCNC: 83 MG/DL (ref 70–99)
HBA1C MFR BLD: 5.7 % (ref 4.8–5.6)
POTASSIUM SERPL-SCNC: 5.1 MMOL/L (ref 3.5–5.2)
PROT SERPL-MCNC: 6.7 G/DL (ref 6–8.5)
SODIUM SERPL-SCNC: 132 MMOL/L (ref 134–144)
SPECIMEN STATUS REPORT: NORMAL

## 2025-06-27 ENCOUNTER — OFFICE VISIT (OUTPATIENT)
Dept: FAMILY MEDICINE CLINIC | Facility: CLINIC | Age: 65
End: 2025-06-27
Payer: MEDICARE

## 2025-06-27 VITALS
WEIGHT: 199.1 LBS | TEMPERATURE: 97.6 F | BODY MASS INDEX: 26.97 KG/M2 | DIASTOLIC BLOOD PRESSURE: 82 MMHG | SYSTOLIC BLOOD PRESSURE: 118 MMHG | HEIGHT: 72 IN | OXYGEN SATURATION: 98 % | HEART RATE: 79 BPM

## 2025-06-27 DIAGNOSIS — Z12.31 ENCOUNTER FOR SCREENING MAMMOGRAM FOR BREAST CANCER: ICD-10-CM

## 2025-06-27 DIAGNOSIS — E11.9 CONTROLLED TYPE 2 DIABETES MELLITUS WITHOUT COMPLICATION, WITHOUT LONG-TERM CURRENT USE OF INSULIN (HCC): ICD-10-CM

## 2025-06-27 DIAGNOSIS — R74.8 ELEVATED ALKALINE PHOSPHATASE LEVEL: Primary | ICD-10-CM

## 2025-06-27 LAB — SPECIMEN STATUS REPORT: NORMAL

## 2025-06-27 PROCEDURE — 1036F TOBACCO NON-USER: CPT

## 2025-06-27 PROCEDURE — G8400 PT W/DXA NO RESULTS DOC: HCPCS

## 2025-06-27 PROCEDURE — 1090F PRES/ABSN URINE INCON ASSESS: CPT

## 2025-06-27 PROCEDURE — 99214 OFFICE O/P EST MOD 30 MIN: CPT

## 2025-06-27 PROCEDURE — 2022F DILAT RTA XM EVC RTNOPTHY: CPT

## 2025-06-27 PROCEDURE — 3074F SYST BP LT 130 MM HG: CPT

## 2025-06-27 PROCEDURE — G8427 DOCREV CUR MEDS BY ELIG CLIN: HCPCS

## 2025-06-27 PROCEDURE — 3044F HG A1C LEVEL LT 7.0%: CPT

## 2025-06-27 PROCEDURE — G8419 CALC BMI OUT NRM PARAM NOF/U: HCPCS

## 2025-06-27 PROCEDURE — 3017F COLORECTAL CA SCREEN DOC REV: CPT

## 2025-06-27 PROCEDURE — 3079F DIAST BP 80-89 MM HG: CPT

## 2025-06-27 PROCEDURE — 1123F ACP DISCUSS/DSCN MKR DOCD: CPT

## 2025-06-27 RX ORDER — GABAPENTIN 100 MG/1
100 CAPSULE ORAL 3 TIMES DAILY
Qty: 90 CAPSULE | Refills: 2 | Status: SHIPPED
Start: 2025-06-27 | End: 2025-09-25

## 2025-06-27 RX ORDER — ORAL SEMAGLUTIDE 7 MG/1
7 TABLET ORAL DAILY
Qty: 90 TABLET | Refills: 2 | Status: SHIPPED
Start: 2025-06-27

## 2025-06-27 RX ORDER — EMPAGLIFLOZIN 25 MG/1
25 TABLET, FILM COATED ORAL DAILY
COMMUNITY
Start: 2025-06-03

## 2025-06-27 RX ORDER — CITALOPRAM HYDROBROMIDE 20 MG/1
20 TABLET ORAL DAILY
Qty: 90 TABLET | Refills: 2 | Status: SHIPPED | OUTPATIENT
Start: 2025-06-27

## 2025-06-27 RX ORDER — ATORVASTATIN CALCIUM 20 MG/1
20 TABLET, FILM COATED ORAL DAILY
Qty: 90 TABLET | Refills: 2 | Status: SHIPPED
Start: 2025-06-27

## 2025-06-27 RX ORDER — BUPROPION HYDROCHLORIDE 300 MG/1
300 TABLET ORAL EVERY MORNING
Qty: 90 TABLET | Refills: 2 | Status: SHIPPED
Start: 2025-06-27

## 2025-06-27 RX ORDER — GABAPENTIN 100 MG/1
100 CAPSULE ORAL 3 TIMES DAILY
Qty: 90 CAPSULE | Status: CANCELLED | OUTPATIENT
Start: 2025-06-27

## 2025-06-27 RX ORDER — LORATADINE 10 MG/1
10 TABLET ORAL DAILY
Qty: 90 TABLET | Refills: 2 | Status: SHIPPED
Start: 2025-06-27

## 2025-06-27 RX ORDER — OMEPRAZOLE 20 MG/1
20 CAPSULE, DELAYED RELEASE ORAL 2 TIMES DAILY
Qty: 90 CAPSULE | Refills: 2 | Status: SHIPPED
Start: 2025-06-27

## 2025-06-27 RX ORDER — LOSARTAN POTASSIUM 100 MG/1
100 TABLET ORAL DAILY
Qty: 90 TABLET | Refills: 2 | Status: SHIPPED
Start: 2025-06-27

## 2025-06-27 ASSESSMENT — PATIENT HEALTH QUESTIONNAIRE - PHQ9
8. MOVING OR SPEAKING SO SLOWLY THAT OTHER PEOPLE COULD HAVE NOTICED. OR THE OPPOSITE, BEING SO FIGETY OR RESTLESS THAT YOU HAVE BEEN MOVING AROUND A LOT MORE THAN USUAL: NOT AT ALL
SUM OF ALL RESPONSES TO PHQ QUESTIONS 1-9: 0
6. FEELING BAD ABOUT YOURSELF - OR THAT YOU ARE A FAILURE OR HAVE LET YOURSELF OR YOUR FAMILY DOWN: NOT AT ALL
1. LITTLE INTEREST OR PLEASURE IN DOING THINGS: NOT AT ALL
7. TROUBLE CONCENTRATING ON THINGS, SUCH AS READING THE NEWSPAPER OR WATCHING TELEVISION: NOT AT ALL
5. POOR APPETITE OR OVEREATING: NOT AT ALL
SUM OF ALL RESPONSES TO PHQ QUESTIONS 1-9: 0
10. IF YOU CHECKED OFF ANY PROBLEMS, HOW DIFFICULT HAVE THESE PROBLEMS MADE IT FOR YOU TO DO YOUR WORK, TAKE CARE OF THINGS AT HOME, OR GET ALONG WITH OTHER PEOPLE: NOT DIFFICULT AT ALL
SUM OF ALL RESPONSES TO PHQ QUESTIONS 1-9: 0
SUM OF ALL RESPONSES TO PHQ QUESTIONS 1-9: 0
2. FEELING DOWN, DEPRESSED OR HOPELESS: NOT AT ALL
3. TROUBLE FALLING OR STAYING ASLEEP: NOT AT ALL
4. FEELING TIRED OR HAVING LITTLE ENERGY: NOT AT ALL
9. THOUGHTS THAT YOU WOULD BE BETTER OFF DEAD, OR OF HURTING YOURSELF: NOT AT ALL

## 2025-06-27 ASSESSMENT — ENCOUNTER SYMPTOMS: RESPIRATORY NEGATIVE: 1

## 2025-06-27 NOTE — PROGRESS NOTES
Amy Sloan presents today for   Chief Complaint   Patient presents with    Follow-up     Follow up for lab work. Labs drawn prior to appointment. She would like to discuss getting a dexcom or something similar.        Is someone accompanying this pt? No     Is the patient using any DME equipment during OV? No     Depression Screenin/27/2025     8:00 AM 2025    10:52 AM 1/10/2025     8:01 AM 10/6/2022     9:56 AM   PHQ-9 Questionaire   Little interest or pleasure in doing things 0 0 0 0   Feeling down, depressed, or hopeless 0 0 0 0   Trouble falling or staying asleep, or sleeping too much 0 0 0    Feeling tired or having little energy 0 0 0    Poor appetite or overeating 0 0 0    Feeling bad about yourself - or that you are a failure or have let yourself or your family down 0 0 0    Trouble concentrating on things, such as reading the newspaper or watching television 0 0 0    Moving or speaking so slowly that other people could have noticed. Or the opposite - being so fidgety or restless that you have been moving around a lot more than usual 0 0 0    Thoughts that you would be better off dead, or of hurting yourself in some way 0 0 0    PHQ-9 Total Score 0 0  0 0   If you checked off any problems, how difficult have these problems made it for you to do your work, take care of things at home, or get along with other people? 0 0 0        Patient-reported       Fall Risk      2025     8:01 AM 2025    10:52 AM 1/10/2025     8:01 AM   Fall Risk   Do you feel unsteady or are you worried about falling?  no no no   2 or more falls in past year? no no no   Fall with injury in past year? no no no        Health Maintenance reviewed and discussed and ordered per Provider.    Health Maintenance Due   Topic Date Due    Diabetic foot exam  Never done    Cervical cancer screen  Never done    DEXA (modify frequency per FRAX score)  Never done    COVID-19 Vaccine ( season) 2024

## 2025-06-27 NOTE — PROGRESS NOTES
Amy Sloan is a 65 y.o. female presents with   Chief Complaint   Patient presents with    Follow-up     Follow up for lab work. Labs drawn prior to appointment. She would like to discuss getting a dexcom or something similar.      65-year-old female patient presents today for follow-up.  Her alkaline phosphatase remains elevated, therefore I discussed with her having more lab work done. She denies any complaints. Diabetic foot exam completed also. Nephrology recently started her on Jardiance, she states she has not started it but plans to after today, I advised her she needs to as her kidney function has declined.       Diagnosis   1. Elevated alkaline phosphatase level          2. Encounter for screening mammogram for breast cancer       3. Controlled type 2 diabetes mellitus without complication, without long-term current use of insulin (HCC)            /82   Pulse 79   Temp 97.6 °F (36.4 °C) (Temporal)   Ht 1.829 m (6')   Wt 90.3 kg (199 lb 1.6 oz)   SpO2 98%   BMI 27.00 kg/m²   Subjective:     Past Medical History:   Diagnosis Date    Allergic rhinitis 2020    Saw ENT    Anemia     Anxiety 1990    Chronic constipation     Ongoing problem for years    Chronic kidney disease 2017    Depression 1990    Controlled    Diabetes (HCC)     GERD (gastroesophageal reflux disease) 1980    All my life    Hearing loss 2024    Need to get checked    Hyperlipemia     Hypertension 2017    Irritable bowel syndrome 2020    Diagnosed    Obesity 2012    Lost 40 pounds since    Osteoarthritis 2024    Lower back- MRI    Psychiatric disorder     Restless legs syndrome 2015    Type 2 diabetes mellitus without complication (HCC) 2012    Pre-diabetes     Past Surgical History:   Procedure Laterality Date    ANKLE FRACTURE SURGERY  2/21/2021    Dr Portillo performed    CERVICAL FUSION      COLONOSCOPY N/A 2/22/2022    COLONOSCOPY performed by Scooby Wellington MD at [facility] ENDOSCOPY    FRACTURE SURGERY  2021    Ankle

## 2025-06-28 LAB
IRON SATN MFR SERPL: 19 % (ref 15–55)
IRON SERPL-MCNC: 68 UG/DL (ref 27–139)
TIBC SERPL-MCNC: 361 UG/DL (ref 250–450)
UIBC SERPL-MCNC: 293 UG/DL (ref 118–369)

## 2025-06-30 ENCOUNTER — HOSPITAL ENCOUNTER (OUTPATIENT)
Age: 65
Setting detail: SPECIMEN
Discharge: HOME | End: 2025-07-03
Payer: MEDICARE

## 2025-06-30 ENCOUNTER — CLINICAL SUPPORT (OUTPATIENT)
Dept: FAMILY MEDICINE CLINIC | Facility: CLINIC | Age: 65
End: 2025-06-30
Payer: MEDICARE

## 2025-06-30 DIAGNOSIS — R74.8 ELEVATED ALKALINE PHOSPHATASE LEVEL: Primary | ICD-10-CM

## 2025-06-30 PROCEDURE — 82977 ASSAY OF GGT: CPT

## 2025-06-30 PROCEDURE — 84080 ASSAY ALKALINE PHOSPHATASES: CPT

## 2025-06-30 PROCEDURE — 84075 ASSAY ALKALINE PHOSPHATASE: CPT

## 2025-06-30 PROCEDURE — 99211 OFF/OP EST MAY X REQ PHY/QHP: CPT

## 2025-06-30 PROCEDURE — 36415 COLL VENOUS BLD VENIPUNCTURE: CPT

## 2025-06-30 NOTE — PROGRESS NOTES
Verbal order from provider to draw labs in office    Patient presents for lab draw ordered by:    Ordering Provider:  SUAD  Ordering Department/Practice:  [facility]  Phone:  [phone]  Date Ordered:  063025    Labs were drawn and sent to [facility] by Frannie Ritchie LPN:    The following tubes were sent:    Gold  ( 1)    Draw site attempted in left hand, received flash, but no blood return and patient's vein blew. Draw site completed in right hand.  Patient tolerated draw with no distress.

## 2025-07-01 LAB — GGT SERPL-CCNC: 24 U/L (ref 5–55)

## 2025-07-04 LAB
ALP BONE CFR SERPL: 23 % (ref 14–68)
ALP INTEST CFR SERPL: 1 % (ref 0–18)
ALP LIVER CFR SERPL: 76 % (ref 18–85)
ALP SERPL-CCNC: 127 IU/L (ref 44–121)

## 2025-07-09 DIAGNOSIS — E11.9 CONTROLLED TYPE 2 DIABETES MELLITUS WITHOUT COMPLICATION, WITHOUT LONG-TERM CURRENT USE OF INSULIN (HCC): ICD-10-CM

## 2025-07-09 RX ORDER — GABAPENTIN 100 MG/1
100 CAPSULE ORAL 3 TIMES DAILY
Qty: 90 CAPSULE | Refills: 2 | Status: SHIPPED | OUTPATIENT
Start: 2025-07-09 | End: 2025-10-07

## 2025-07-09 RX ORDER — LOSARTAN POTASSIUM 100 MG/1
100 TABLET ORAL DAILY
Qty: 90 TABLET | Refills: 2 | Status: SHIPPED | OUTPATIENT
Start: 2025-07-09

## (undated) DEVICE — SYRINGE BLB 60 CC TIP PROTECTOR STRL LF

## (undated) DEVICE — SUTURE VCRL SZ 2-0 L27IN ABSRB UD L26MM CT-2 1/2 CIR J269H

## (undated) DEVICE — PACK,EXTREMITY III: Brand: MEDLINE

## (undated) DEVICE — COUNTER NDL 40 COUNT HLD 70 FOAM BLK ADH W/ MAG

## (undated) DEVICE — MARKER SKN REG TIP W/RULER -- STRL

## (undated) DEVICE — ENDOGATOR TUBING FOR BOSTON SCIENTIFIC ENDOSTAT II PUMP, OLYMPUS OFP PUMP OR ENDO STRATUS PUMP: Brand: ENDOGATOR

## (undated) DEVICE — 3M™ COBAN™ NL STERILE NON-LATEX SELF-ADHERENT WRAP, 2086S, 6 IN X 5 YD (15 CM X 4,5 M), 12 ROLLS/CASE: Brand: 3M™ COBAN™

## (undated) DEVICE — Z CONVERTED USE 2271393 BANDAGE COMPR ELASTIC 4 YDX6 IN ESMRK LF

## (undated) DEVICE — SYR 20ML LL STRL LF --

## (undated) DEVICE — UNDERGLOVE SURG SZ 7.5 BLU LTX FREE SYN POLYISOPRENE

## (undated) DEVICE — ARGYLE FRAZIER SURGICAL SUCTION INSTRUMENT 10 FR/CH (3.3 MM): Brand: ARGYLE

## (undated) DEVICE — BIT DRL DIA2.5MM FOR SM FRAG PLATING SYS

## (undated) DEVICE — PADDING CAST W4INXL4YD SYN NAT PROTOUCH

## (undated) DEVICE — STERILE POLYISOPRENE POWDER-FREE SURGICAL GLOVES: Brand: PROTEXIS

## (undated) DEVICE — BIT DRL DIA2.7MM CALIB

## (undated) DEVICE — KIT COLON W/ 1.1OZ LUB AND 2 END

## (undated) DEVICE — SOL IRR NACL 0.9% 500ML POUR --

## (undated) DEVICE — TUBING INSUFFLATION CAP W/ EXT CARBON DIOX ENDO SMARTCAP

## (undated) DEVICE — BIT DRL L5.5IN DIA3.5MM OVR QUIK CONN DISP FOR SM FRAG

## (undated) DEVICE — Device: Brand: DEFENDO VALVE AND CONNECTOR KIT

## (undated) DEVICE — HYPODERMIC SAFETY NEEDLE: Brand: MAGELLAN

## (undated) DEVICE — K WIRE FIX L6IN DIA1.6MM FEM TIB SMOOTH BAYNT TOT FT FOR
Type: IMPLANTABLE DEVICE | Site: ANKLE | Status: NON-FUNCTIONAL
Removed: 2021-02-25

## (undated) DEVICE — STERILE POLYISOPRENE POWDER-FREE SURGICAL GLOVES WITH EMOLLIENT COATING: Brand: PROTEXIS

## (undated) DEVICE — COVER,TABLE,HEAVY DUTY,77"X90",STRL: Brand: MEDLINE

## (undated) DEVICE — GLOVE SURG 7 BIOGEL PI ULTRATOUCH G

## (undated) DEVICE — REM POLYHESIVE ADULT PATIENT RETURN ELECTRODE: Brand: VALLEYLAB

## (undated) DEVICE — SOL IRR STRL H2O 1000ML BTL --

## (undated) DEVICE — GOWN,AURORA,FABRIC-REINFORCED,X-LARGE: Brand: MEDLINE

## (undated) DEVICE — SPONGE LAP SOFT 18X18 IN X RAY DETECTABLE

## (undated) DEVICE — STAPLER SKIN H3.9MM WIRE DIA0.58MM CRWN 6.9MM 35 STPL FIX

## (undated) DEVICE — INTENDED FOR TISSUE SEPARATION, AND OTHER PROCEDURES THAT REQUIRE A SHARP SURGICAL BLADE TO PUNCTURE OR CUT.: Brand: BARD-PARKER SAFETY BLADES SIZE 15, STERILE

## (undated) DEVICE — TUBING, SUCTION, 9/32" X 10', STRAIGHT: Brand: MEDLINE

## (undated) DEVICE — GAUZE SPNG AVANT 4X4 4PLY NS --

## (undated) DEVICE — SHEET, ORTHO, SPLIT, STERILE: Brand: MEDLINE

## (undated) DEVICE — SKIN CLOS DERMABND PRINEO 60CM -- DERMABOUND PRINEO

## (undated) DEVICE — SUTURE VCRL SZ 0 L27IN ABSRB UD L36MM CT-1 1/2 CIR J260H

## (undated) DEVICE — DRESSING,GAUZE,XEROFORM,CURAD,1"X8",ST: Brand: CURAD

## (undated) DEVICE — ZIMMER® STERILE DISPOSABLE TOURNIQUET CUFF WITH PLC, DUAL PORT, SINGLE BLADDER, 34 IN. (86 CM)

## (undated) DEVICE — SOL IRR STRL H2O 500ML STRL --

## (undated) DEVICE — TAP BNE DIA3.5MM THRD L70MM CORT NONCANNULATED QUIK CONN

## (undated) DEVICE — DRAPE EQUIP C ARM 74X42 IN MOB XR W/ TIE RUBBER BND LF

## (undated) DEVICE — SHEET,DRAPE,70X100,STERILE: Brand: MEDLINE

## (undated) DEVICE — CONMED SCOPE SAVER BITE BLOCK, 20X27 MM: Brand: SCOPE SAVER

## (undated) DEVICE — PENCIL SMK EVAC L10FT TBNG NONSTICK ESU BLDE PLUMEPEN ELITE

## (undated) DEVICE — (D)PREP SKN CHLRAPRP APPL 26ML -- CONVERT TO ITEM 371833

## (undated) DEVICE — 3M™ IOBAN™ 2 ANTIMICROBIAL INCISE DRAPE 6650EZ: Brand: IOBAN™ 2

## (undated) DEVICE — GOWN,PRECEPT, XLNG/XLRG ,STRL: Brand: MEDLINE